# Patient Record
Sex: FEMALE | Race: WHITE | Employment: OTHER | URBAN - METROPOLITAN AREA
[De-identification: names, ages, dates, MRNs, and addresses within clinical notes are randomized per-mention and may not be internally consistent; named-entity substitution may affect disease eponyms.]

---

## 2021-01-01 ENCOUNTER — HOSPITAL ENCOUNTER (INPATIENT)
Facility: CLINIC | Age: 31
LOS: 9 days | End: 2021-11-08
Attending: SURGERY | Admitting: SURGERY
Payer: COMMERCIAL

## 2021-01-01 ENCOUNTER — TRANSFERRED RECORDS (OUTPATIENT)
Dept: HEALTH INFORMATION MANAGEMENT | Facility: CLINIC | Age: 31
End: 2021-01-01
Payer: COMMERCIAL

## 2021-01-01 ENCOUNTER — APPOINTMENT (OUTPATIENT)
Dept: ULTRASOUND IMAGING | Facility: CLINIC | Age: 31
End: 2021-01-01
Attending: NURSE PRACTITIONER
Payer: COMMERCIAL

## 2021-01-01 ENCOUNTER — APPOINTMENT (OUTPATIENT)
Dept: GENERAL RADIOLOGY | Facility: CLINIC | Age: 31
End: 2021-01-01
Attending: NURSE PRACTITIONER
Payer: COMMERCIAL

## 2021-01-01 ENCOUNTER — APPOINTMENT (OUTPATIENT)
Dept: GENERAL RADIOLOGY | Facility: CLINIC | Age: 31
End: 2021-01-01
Attending: STUDENT IN AN ORGANIZED HEALTH CARE EDUCATION/TRAINING PROGRAM
Payer: COMMERCIAL

## 2021-01-01 ENCOUNTER — APPOINTMENT (OUTPATIENT)
Dept: CARDIOLOGY | Facility: CLINIC | Age: 31
End: 2021-01-01
Attending: NURSE PRACTITIONER
Payer: COMMERCIAL

## 2021-01-01 VITALS
OXYGEN SATURATION: 88 % | DIASTOLIC BLOOD PRESSURE: 66 MMHG | BODY MASS INDEX: 39.12 KG/M2 | WEIGHT: 243.39 LBS | TEMPERATURE: 97.4 F | SYSTOLIC BLOOD PRESSURE: 118 MMHG | HEIGHT: 66 IN

## 2021-01-01 LAB
ABO/RH(D): NORMAL
ALBUMIN SERPL-MCNC: 1.7 G/DL (ref 3.4–5)
ALBUMIN SERPL-MCNC: 1.8 G/DL (ref 3.4–5)
ALBUMIN SERPL-MCNC: 2.1 G/DL (ref 3.4–5)
ALBUMIN SERPL-MCNC: 2.3 G/DL (ref 3.4–5)
ALBUMIN SERPL-MCNC: 2.4 G/DL (ref 3.4–5)
ALBUMIN SERPL-MCNC: 2.5 G/DL (ref 3.4–5)
ALBUMIN UR-MCNC: 100 MG/DL
ALBUMIN UR-MCNC: 200 MG/DL
ALBUMIN UR-MCNC: 30 MG/DL
ALP SERPL-CCNC: 107 U/L (ref 40–150)
ALP SERPL-CCNC: 82 U/L (ref 40–150)
ALP SERPL-CCNC: 85 U/L (ref 40–150)
ALP SERPL-CCNC: 93 U/L (ref 40–150)
ALP SERPL-CCNC: 95 U/L (ref 40–150)
ALT SERPL W P-5'-P-CCNC: 20 U/L (ref 0–50)
ALT SERPL W P-5'-P-CCNC: 32 U/L (ref 0–50)
ALT SERPL W P-5'-P-CCNC: 40 U/L (ref 0–50)
ALT SERPL W P-5'-P-CCNC: 44 U/L (ref 0–50)
ALT SERPL W P-5'-P-CCNC: 51 U/L (ref 0–50)
ANION GAP SERPL CALCULATED.3IONS-SCNC: 10 MMOL/L (ref 3–14)
ANION GAP SERPL CALCULATED.3IONS-SCNC: 5 MMOL/L (ref 3–14)
ANION GAP SERPL CALCULATED.3IONS-SCNC: 5 MMOL/L (ref 3–14)
ANION GAP SERPL CALCULATED.3IONS-SCNC: 6 MMOL/L (ref 3–14)
ANION GAP SERPL CALCULATED.3IONS-SCNC: 7 MMOL/L (ref 3–14)
ANION GAP SERPL CALCULATED.3IONS-SCNC: 8 MMOL/L (ref 3–14)
APPEARANCE UR: ABNORMAL
APPEARANCE UR: ABNORMAL
APPEARANCE UR: CLEAR
APTT PPP: 25 SECONDS (ref 22–38)
AST SERPL W P-5'-P-CCNC: 17 U/L (ref 0–45)
AST SERPL W P-5'-P-CCNC: 31 U/L (ref 0–45)
AST SERPL W P-5'-P-CCNC: 42 U/L (ref 0–45)
AST SERPL W P-5'-P-CCNC: 54 U/L (ref 0–45)
AST SERPL W P-5'-P-CCNC: ABNORMAL U/L
ATRIAL RATE - MUSE: 94 BPM
BACTERIA #/AREA URNS HPF: ABNORMAL /HPF
BACTERIA BLD CULT: ABNORMAL
BACTERIA BLD CULT: ABNORMAL
BACTERIA BLD CULT: NO GROWTH
BACTERIA SPT CULT: ABNORMAL
BACTERIA UR CULT: NORMAL
BASE EXCESS BLDA CALC-SCNC: -0.7 MMOL/L (ref -9–1.8)
BASE EXCESS BLDA CALC-SCNC: -3.6 MMOL/L (ref -9–1.8)
BASE EXCESS BLDA CALC-SCNC: -3.7 MMOL/L (ref -9–1.8)
BASE EXCESS BLDA CALC-SCNC: -4.5 MMOL/L (ref -9–1.8)
BASE EXCESS BLDA CALC-SCNC: -4.8 MMOL/L (ref -9–1.8)
BASE EXCESS BLDA CALC-SCNC: -5.5 MMOL/L (ref -9–1.8)
BASE EXCESS BLDA CALC-SCNC: -6.5 MMOL/L (ref -9–1.8)
BASE EXCESS BLDA CALC-SCNC: -6.8 MMOL/L (ref -9–1.8)
BASE EXCESS BLDA CALC-SCNC: -6.9 MMOL/L (ref -9–1.8)
BASE EXCESS BLDA CALC-SCNC: -6.9 MMOL/L (ref -9–1.8)
BASE EXCESS BLDA CALC-SCNC: -7 MMOL/L (ref -9–1.8)
BASE EXCESS BLDA CALC-SCNC: -7 MMOL/L (ref -9–1.8)
BASE EXCESS BLDA CALC-SCNC: -7.2 MMOL/L (ref -9–1.8)
BASE EXCESS BLDA CALC-SCNC: -7.3 MMOL/L (ref -9–1.8)
BASE EXCESS BLDA CALC-SCNC: -7.6 MMOL/L (ref -9–1.8)
BASE EXCESS BLDA CALC-SCNC: -7.8 MMOL/L (ref -9–1.8)
BASE EXCESS BLDA CALC-SCNC: -8.2 MMOL/L (ref -9–1.8)
BASE EXCESS BLDA CALC-SCNC: -8.2 MMOL/L (ref -9–1.8)
BASE EXCESS BLDA CALC-SCNC: 2.6 MMOL/L (ref -9–1.8)
BASE EXCESS BLDA CALC-SCNC: 2.8 MMOL/L (ref -9–1.8)
BASE EXCESS BLDA CALC-SCNC: 3.1 MMOL/L (ref -9–1.8)
BASE EXCESS BLDA CALC-SCNC: 4 MMOL/L (ref -9–1.8)
BASE EXCESS BLDA CALC-SCNC: 4.2 MMOL/L (ref -9–1.8)
BASE EXCESS BLDA CALC-SCNC: 4.2 MMOL/L (ref -9–1.8)
BASE EXCESS BLDA CALC-SCNC: 4.8 MMOL/L (ref -9–1.8)
BASE EXCESS BLDA CALC-SCNC: 6.2 MMOL/L (ref -9–1.8)
BASE EXCESS BLDA CALC-SCNC: 7.8 MMOL/L (ref -9–1.8)
BASOPHILS # BLD MANUAL: 0.1 10E3/UL (ref 0–0.2)
BASOPHILS NFR BLD MANUAL: 1 %
BILIRUB SERPL-MCNC: 0.2 MG/DL (ref 0.2–1.3)
BILIRUB SERPL-MCNC: 0.3 MG/DL (ref 0.2–1.3)
BILIRUB SERPL-MCNC: 0.4 MG/DL (ref 0.2–1.3)
BILIRUB UR QL STRIP: NEGATIVE
BUN SERPL-MCNC: 108 MG/DL (ref 7–30)
BUN SERPL-MCNC: 108 MG/DL (ref 7–30)
BUN SERPL-MCNC: 109 MG/DL (ref 7–30)
BUN SERPL-MCNC: 116 MG/DL (ref 7–30)
BUN SERPL-MCNC: 120 MG/DL (ref 7–30)
BUN SERPL-MCNC: 123 MG/DL (ref 7–30)
BUN SERPL-MCNC: 26 MG/DL (ref 7–30)
BUN SERPL-MCNC: 36 MG/DL (ref 7–30)
BUN SERPL-MCNC: 47 MG/DL (ref 7–30)
BUN SERPL-MCNC: 49 MG/DL (ref 7–30)
BUN SERPL-MCNC: 56 MG/DL (ref 7–30)
BUN SERPL-MCNC: 59 MG/DL (ref 7–30)
BUN SERPL-MCNC: 60 MG/DL (ref 7–30)
BUN SERPL-MCNC: 62 MG/DL (ref 7–30)
BUN SERPL-MCNC: 66 MG/DL (ref 7–30)
BUN SERPL-MCNC: 72 MG/DL (ref 7–30)
BUN SERPL-MCNC: 76 MG/DL (ref 7–30)
BUN SERPL-MCNC: 79 MG/DL (ref 7–30)
BUN SERPL-MCNC: 88 MG/DL (ref 7–30)
BUN SERPL-MCNC: 90 MG/DL (ref 7–30)
CA-I BLD-MCNC: 4.1 MG/DL (ref 4.4–5.2)
CA-I BLD-MCNC: 4.3 MG/DL (ref 4.4–5.2)
CA-I BLD-MCNC: 4.4 MG/DL (ref 4.4–5.2)
CA-I BLD-MCNC: 4.6 MG/DL (ref 4.4–5.2)
CA-I BLD-MCNC: 4.7 MG/DL (ref 4.4–5.2)
CA-I BLD-MCNC: 4.9 MG/DL (ref 4.4–5.2)
CALCIUM SERPL-MCNC: 7.2 MG/DL (ref 8.5–10.1)
CALCIUM SERPL-MCNC: 7.2 MG/DL (ref 8.5–10.1)
CALCIUM SERPL-MCNC: 7.3 MG/DL (ref 8.5–10.1)
CALCIUM SERPL-MCNC: 7.4 MG/DL (ref 8.5–10.1)
CALCIUM SERPL-MCNC: 7.6 MG/DL (ref 8.5–10.1)
CALCIUM SERPL-MCNC: 7.8 MG/DL (ref 8.5–10.1)
CALCIUM SERPL-MCNC: 8 MG/DL (ref 8.5–10.1)
CALCIUM SERPL-MCNC: 8 MG/DL (ref 8.5–10.1)
CALCIUM SERPL-MCNC: 8.3 MG/DL (ref 8.5–10.1)
CALCIUM SERPL-MCNC: 8.6 MG/DL (ref 8.5–10.1)
CALCIUM SERPL-MCNC: 9 MG/DL (ref 8.5–10.1)
CHLORIDE BLD-SCNC: 104 MMOL/L (ref 94–109)
CHLORIDE BLD-SCNC: 105 MMOL/L (ref 94–109)
CHLORIDE BLD-SCNC: 106 MMOL/L (ref 94–109)
CHLORIDE BLD-SCNC: 108 MMOL/L (ref 94–109)
CHLORIDE BLD-SCNC: 111 MMOL/L (ref 94–109)
CHLORIDE BLD-SCNC: 111 MMOL/L (ref 94–109)
CHLORIDE BLD-SCNC: 112 MMOL/L (ref 94–109)
CHLORIDE BLD-SCNC: 113 MMOL/L (ref 94–109)
CHLORIDE BLD-SCNC: 114 MMOL/L (ref 94–109)
CHLORIDE BLD-SCNC: 115 MMOL/L (ref 94–109)
CHLORIDE BLD-SCNC: 116 MMOL/L (ref 94–109)
CK SERPL-CCNC: 53 U/L (ref 30–225)
CO2 SERPL-SCNC: 19 MMOL/L (ref 20–32)
CO2 SERPL-SCNC: 19 MMOL/L (ref 20–32)
CO2 SERPL-SCNC: 20 MMOL/L (ref 20–32)
CO2 SERPL-SCNC: 21 MMOL/L (ref 20–32)
CO2 SERPL-SCNC: 21 MMOL/L (ref 20–32)
CO2 SERPL-SCNC: 22 MMOL/L (ref 20–32)
CO2 SERPL-SCNC: 24 MMOL/L (ref 20–32)
CO2 SERPL-SCNC: 24 MMOL/L (ref 20–32)
CO2 SERPL-SCNC: 27 MMOL/L (ref 20–32)
CO2 SERPL-SCNC: 28 MMOL/L (ref 20–32)
CO2 SERPL-SCNC: 28 MMOL/L (ref 20–32)
CO2 SERPL-SCNC: 29 MMOL/L (ref 20–32)
CO2 SERPL-SCNC: 29 MMOL/L (ref 20–32)
CO2 SERPL-SCNC: 30 MMOL/L (ref 20–32)
CO2 SERPL-SCNC: 30 MMOL/L (ref 20–32)
CO2 SERPL-SCNC: 32 MMOL/L (ref 20–32)
COLOR UR AUTO: ABNORMAL
COLOR UR AUTO: YELLOW
COLOR UR AUTO: YELLOW
CREAT SERPL-MCNC: 0.94 MG/DL (ref 0.52–1.04)
CREAT SERPL-MCNC: 1.23 MG/DL (ref 0.52–1.04)
CREAT SERPL-MCNC: 1.34 MG/DL (ref 0.52–1.04)
CREAT SERPL-MCNC: 1.76 MG/DL (ref 0.52–1.04)
CREAT SERPL-MCNC: 1.79 MG/DL (ref 0.52–1.04)
CREAT SERPL-MCNC: 1.97 MG/DL (ref 0.52–1.04)
CREAT SERPL-MCNC: 1.97 MG/DL (ref 0.52–1.04)
CREAT SERPL-MCNC: 2.15 MG/DL (ref 0.52–1.04)
CREAT SERPL-MCNC: 2.15 MG/DL (ref 0.52–1.04)
CREAT SERPL-MCNC: 2.48 MG/DL (ref 0.52–1.04)
CREAT SERPL-MCNC: 2.49 MG/DL (ref 0.52–1.04)
CREAT SERPL-MCNC: 3.08 MG/DL (ref 0.52–1.04)
CREAT SERPL-MCNC: 3.11 MG/DL (ref 0.52–1.04)
CREAT SERPL-MCNC: 3.3 MG/DL (ref 0.52–1.04)
CREAT SERPL-MCNC: 3.4 MG/DL (ref 0.52–1.04)
CREAT SERPL-MCNC: 3.41 MG/DL (ref 0.52–1.04)
CREAT SERPL-MCNC: 3.42 MG/DL (ref 0.52–1.04)
CREAT SERPL-MCNC: 3.43 MG/DL (ref 0.52–1.04)
CREAT SERPL-MCNC: 3.45 MG/DL (ref 0.52–1.04)
CREAT SERPL-MCNC: 3.58 MG/DL (ref 0.52–1.04)
CREAT SERPL-MCNC: 3.6 MG/DL (ref 0.52–1.04)
CREAT UR-MCNC: 88 MG/DL
CREATININE (EXTERNAL): 0.67 MG/DL (ref 0.5–0.9)
CREATININE (EXTERNAL): 1.18 MG/DL (ref 0.5–0.9)
CREATININE (EXTERNAL): 1.4 MG-DL (ref 0.5–0.9)
CRP SERPL-MCNC: 5.7 MG/L (ref 0–8)
CRP SERPL-MCNC: 7.7 MG/L (ref 0–8)
CRP SERPL-MCNC: 83 MG/L (ref 0–8)
CRP SERPL-MCNC: 98 MG/L (ref 0–8)
D DIMER PPP FEU-MCNC: 1.96 UG/ML FEU (ref 0–0.5)
D DIMER PPP FEU-MCNC: 17.83 UG/ML FEU (ref 0–0.5)
D DIMER PPP FEU-MCNC: 3.47 UG/ML FEU (ref 0–0.5)
D DIMER PPP FEU-MCNC: 9.9 UG/ML FEU (ref 0–0.5)
D DIMER PPP FEU-MCNC: >20 UG/ML FEU (ref 0–0.5)
DIASTOLIC BLOOD PRESSURE - MUSE: NORMAL MMHG
ENTEROCOCCUS FAECALIS: NOT DETECTED
ENTEROCOCCUS FAECIUM: NOT DETECTED
EOSINOPHIL # BLD MANUAL: 0 10E3/UL (ref 0–0.7)
EOSINOPHIL NFR BLD MANUAL: 0 %
ERYTHROCYTE [DISTWIDTH] IN BLOOD BY AUTOMATED COUNT: 13.3 % (ref 10–15)
ERYTHROCYTE [DISTWIDTH] IN BLOOD BY AUTOMATED COUNT: 13.5 % (ref 10–15)
ERYTHROCYTE [DISTWIDTH] IN BLOOD BY AUTOMATED COUNT: 13.5 % (ref 10–15)
ERYTHROCYTE [DISTWIDTH] IN BLOOD BY AUTOMATED COUNT: 13.7 % (ref 10–15)
ERYTHROCYTE [DISTWIDTH] IN BLOOD BY AUTOMATED COUNT: 13.7 % (ref 10–15)
ERYTHROCYTE [DISTWIDTH] IN BLOOD BY AUTOMATED COUNT: 13.8 % (ref 10–15)
ERYTHROCYTE [DISTWIDTH] IN BLOOD BY AUTOMATED COUNT: 13.9 % (ref 10–15)
ERYTHROCYTE [DISTWIDTH] IN BLOOD BY AUTOMATED COUNT: 14 % (ref 10–15)
ERYTHROCYTE [DISTWIDTH] IN BLOOD BY AUTOMATED COUNT: 14.1 % (ref 10–15)
FIBRINOGEN PPP-MCNC: 490 MG/DL (ref 170–490)
FIBRINOGEN PPP-MCNC: 544 MG/DL (ref 170–490)
FIBRINOGEN PPP-MCNC: 572 MG/DL (ref 170–490)
FIBRINOGEN PPP-MCNC: 587 MG/DL (ref 170–490)
FIBRINOGEN PPP-MCNC: 603 MG/DL (ref 170–490)
FRACT EXCRET NA UR+SERPL-RTO: 0.5 %
GFR ESTIMATED (EXTERNAL): 102 ML-MIN (ref 60–120)
GFR ESTIMATED (EXTERNAL): 44 ML-MIN (ref 60–120)
GFR ESTIMATED (EXTERNAL): 53 ML-MIN (ref 60–120)
GFR SERPL CREATININE-BSD FRML MDRD: 16 ML/MIN/1.73M2
GFR SERPL CREATININE-BSD FRML MDRD: 16 ML/MIN/1.73M2
GFR SERPL CREATININE-BSD FRML MDRD: 17 ML/MIN/1.73M2
GFR SERPL CREATININE-BSD FRML MDRD: 18 ML/MIN/1.73M2
GFR SERPL CREATININE-BSD FRML MDRD: 19 ML/MIN/1.73M2
GFR SERPL CREATININE-BSD FRML MDRD: 19 ML/MIN/1.73M2
GFR SERPL CREATININE-BSD FRML MDRD: 25 ML/MIN/1.73M2
GFR SERPL CREATININE-BSD FRML MDRD: 30 ML/MIN/1.73M2
GFR SERPL CREATININE-BSD FRML MDRD: 30 ML/MIN/1.73M2
GFR SERPL CREATININE-BSD FRML MDRD: 33 ML/MIN/1.73M2
GFR SERPL CREATININE-BSD FRML MDRD: 33 ML/MIN/1.73M2
GFR SERPL CREATININE-BSD FRML MDRD: 37 ML/MIN/1.73M2
GFR SERPL CREATININE-BSD FRML MDRD: 38 ML/MIN/1.73M2
GFR SERPL CREATININE-BSD FRML MDRD: 52 ML/MIN/1.73M2
GFR SERPL CREATININE-BSD FRML MDRD: 58 ML/MIN/1.73M2
GFR SERPL CREATININE-BSD FRML MDRD: 81 ML/MIN/1.73M2
GLUCOSE (EXTERNAL): 202 MG-DL (ref 70–100)
GLUCOSE (EXTERNAL): 232 MG-DL (ref 70–100)
GLUCOSE (EXTERNAL): 273 MG-DL (ref 70–100)
GLUCOSE BLD-MCNC: 103 MG/DL (ref 70–99)
GLUCOSE BLD-MCNC: 115 MG/DL (ref 70–99)
GLUCOSE BLD-MCNC: 139 MG/DL (ref 70–99)
GLUCOSE BLD-MCNC: 141 MG/DL (ref 70–99)
GLUCOSE BLD-MCNC: 143 MG/DL (ref 70–99)
GLUCOSE BLD-MCNC: 145 MG/DL (ref 70–99)
GLUCOSE BLD-MCNC: 181 MG/DL (ref 70–99)
GLUCOSE BLD-MCNC: 182 MG/DL (ref 70–99)
GLUCOSE BLD-MCNC: 189 MG/DL (ref 70–99)
GLUCOSE BLD-MCNC: 198 MG/DL (ref 70–99)
GLUCOSE BLD-MCNC: 200 MG/DL (ref 70–99)
GLUCOSE BLD-MCNC: 213 MG/DL (ref 70–99)
GLUCOSE BLD-MCNC: 215 MG/DL (ref 70–99)
GLUCOSE BLD-MCNC: 221 MG/DL (ref 70–99)
GLUCOSE BLD-MCNC: 222 MG/DL (ref 70–99)
GLUCOSE BLD-MCNC: 270 MG/DL (ref 70–99)
GLUCOSE BLD-MCNC: 300 MG/DL (ref 70–99)
GLUCOSE BLD-MCNC: 62 MG/DL (ref 70–99)
GLUCOSE BLDC GLUCOMTR-MCNC: 100 MG/DL (ref 70–99)
GLUCOSE BLDC GLUCOMTR-MCNC: 101 MG/DL (ref 70–99)
GLUCOSE BLDC GLUCOMTR-MCNC: 101 MG/DL (ref 70–99)
GLUCOSE BLDC GLUCOMTR-MCNC: 102 MG/DL (ref 70–99)
GLUCOSE BLDC GLUCOMTR-MCNC: 105 MG/DL (ref 70–99)
GLUCOSE BLDC GLUCOMTR-MCNC: 106 MG/DL (ref 70–99)
GLUCOSE BLDC GLUCOMTR-MCNC: 107 MG/DL (ref 70–99)
GLUCOSE BLDC GLUCOMTR-MCNC: 107 MG/DL (ref 70–99)
GLUCOSE BLDC GLUCOMTR-MCNC: 108 MG/DL (ref 70–99)
GLUCOSE BLDC GLUCOMTR-MCNC: 109 MG/DL (ref 70–99)
GLUCOSE BLDC GLUCOMTR-MCNC: 109 MG/DL (ref 70–99)
GLUCOSE BLDC GLUCOMTR-MCNC: 111 MG/DL (ref 70–99)
GLUCOSE BLDC GLUCOMTR-MCNC: 112 MG/DL (ref 70–99)
GLUCOSE BLDC GLUCOMTR-MCNC: 112 MG/DL (ref 70–99)
GLUCOSE BLDC GLUCOMTR-MCNC: 113 MG/DL (ref 70–99)
GLUCOSE BLDC GLUCOMTR-MCNC: 115 MG/DL (ref 70–99)
GLUCOSE BLDC GLUCOMTR-MCNC: 116 MG/DL (ref 70–99)
GLUCOSE BLDC GLUCOMTR-MCNC: 117 MG/DL (ref 70–99)
GLUCOSE BLDC GLUCOMTR-MCNC: 119 MG/DL (ref 70–99)
GLUCOSE BLDC GLUCOMTR-MCNC: 120 MG/DL (ref 70–99)
GLUCOSE BLDC GLUCOMTR-MCNC: 125 MG/DL (ref 70–99)
GLUCOSE BLDC GLUCOMTR-MCNC: 127 MG/DL (ref 70–99)
GLUCOSE BLDC GLUCOMTR-MCNC: 128 MG/DL (ref 70–99)
GLUCOSE BLDC GLUCOMTR-MCNC: 129 MG/DL (ref 70–99)
GLUCOSE BLDC GLUCOMTR-MCNC: 131 MG/DL (ref 70–99)
GLUCOSE BLDC GLUCOMTR-MCNC: 132 MG/DL (ref 70–99)
GLUCOSE BLDC GLUCOMTR-MCNC: 133 MG/DL (ref 70–99)
GLUCOSE BLDC GLUCOMTR-MCNC: 134 MG/DL (ref 70–99)
GLUCOSE BLDC GLUCOMTR-MCNC: 136 MG/DL (ref 70–99)
GLUCOSE BLDC GLUCOMTR-MCNC: 139 MG/DL (ref 70–99)
GLUCOSE BLDC GLUCOMTR-MCNC: 140 MG/DL (ref 70–99)
GLUCOSE BLDC GLUCOMTR-MCNC: 145 MG/DL (ref 70–99)
GLUCOSE BLDC GLUCOMTR-MCNC: 146 MG/DL (ref 70–99)
GLUCOSE BLDC GLUCOMTR-MCNC: 146 MG/DL (ref 70–99)
GLUCOSE BLDC GLUCOMTR-MCNC: 147 MG/DL (ref 70–99)
GLUCOSE BLDC GLUCOMTR-MCNC: 147 MG/DL (ref 70–99)
GLUCOSE BLDC GLUCOMTR-MCNC: 149 MG/DL (ref 70–99)
GLUCOSE BLDC GLUCOMTR-MCNC: 151 MG/DL (ref 70–99)
GLUCOSE BLDC GLUCOMTR-MCNC: 152 MG/DL (ref 70–99)
GLUCOSE BLDC GLUCOMTR-MCNC: 153 MG/DL (ref 70–99)
GLUCOSE BLDC GLUCOMTR-MCNC: 154 MG/DL (ref 70–99)
GLUCOSE BLDC GLUCOMTR-MCNC: 156 MG/DL (ref 70–99)
GLUCOSE BLDC GLUCOMTR-MCNC: 159 MG/DL (ref 70–99)
GLUCOSE BLDC GLUCOMTR-MCNC: 160 MG/DL (ref 70–99)
GLUCOSE BLDC GLUCOMTR-MCNC: 163 MG/DL (ref 70–99)
GLUCOSE BLDC GLUCOMTR-MCNC: 164 MG/DL (ref 70–99)
GLUCOSE BLDC GLUCOMTR-MCNC: 164 MG/DL (ref 70–99)
GLUCOSE BLDC GLUCOMTR-MCNC: 165 MG/DL (ref 70–99)
GLUCOSE BLDC GLUCOMTR-MCNC: 165 MG/DL (ref 70–99)
GLUCOSE BLDC GLUCOMTR-MCNC: 167 MG/DL (ref 70–99)
GLUCOSE BLDC GLUCOMTR-MCNC: 170 MG/DL (ref 70–99)
GLUCOSE BLDC GLUCOMTR-MCNC: 173 MG/DL (ref 70–99)
GLUCOSE BLDC GLUCOMTR-MCNC: 174 MG/DL (ref 70–99)
GLUCOSE BLDC GLUCOMTR-MCNC: 174 MG/DL (ref 70–99)
GLUCOSE BLDC GLUCOMTR-MCNC: 175 MG/DL (ref 70–99)
GLUCOSE BLDC GLUCOMTR-MCNC: 176 MG/DL (ref 70–99)
GLUCOSE BLDC GLUCOMTR-MCNC: 176 MG/DL (ref 70–99)
GLUCOSE BLDC GLUCOMTR-MCNC: 177 MG/DL (ref 70–99)
GLUCOSE BLDC GLUCOMTR-MCNC: 179 MG/DL (ref 70–99)
GLUCOSE BLDC GLUCOMTR-MCNC: 180 MG/DL (ref 70–99)
GLUCOSE BLDC GLUCOMTR-MCNC: 181 MG/DL (ref 70–99)
GLUCOSE BLDC GLUCOMTR-MCNC: 182 MG/DL (ref 70–99)
GLUCOSE BLDC GLUCOMTR-MCNC: 183 MG/DL (ref 70–99)
GLUCOSE BLDC GLUCOMTR-MCNC: 185 MG/DL (ref 70–99)
GLUCOSE BLDC GLUCOMTR-MCNC: 186 MG/DL (ref 70–99)
GLUCOSE BLDC GLUCOMTR-MCNC: 187 MG/DL (ref 70–99)
GLUCOSE BLDC GLUCOMTR-MCNC: 188 MG/DL (ref 70–99)
GLUCOSE BLDC GLUCOMTR-MCNC: 188 MG/DL (ref 70–99)
GLUCOSE BLDC GLUCOMTR-MCNC: 189 MG/DL (ref 70–99)
GLUCOSE BLDC GLUCOMTR-MCNC: 191 MG/DL (ref 70–99)
GLUCOSE BLDC GLUCOMTR-MCNC: 191 MG/DL (ref 70–99)
GLUCOSE BLDC GLUCOMTR-MCNC: 192 MG/DL (ref 70–99)
GLUCOSE BLDC GLUCOMTR-MCNC: 192 MG/DL (ref 70–99)
GLUCOSE BLDC GLUCOMTR-MCNC: 194 MG/DL (ref 70–99)
GLUCOSE BLDC GLUCOMTR-MCNC: 197 MG/DL (ref 70–99)
GLUCOSE BLDC GLUCOMTR-MCNC: 197 MG/DL (ref 70–99)
GLUCOSE BLDC GLUCOMTR-MCNC: 198 MG/DL (ref 70–99)
GLUCOSE BLDC GLUCOMTR-MCNC: 199 MG/DL (ref 70–99)
GLUCOSE BLDC GLUCOMTR-MCNC: 201 MG/DL (ref 70–99)
GLUCOSE BLDC GLUCOMTR-MCNC: 201 MG/DL (ref 70–99)
GLUCOSE BLDC GLUCOMTR-MCNC: 202 MG/DL (ref 70–99)
GLUCOSE BLDC GLUCOMTR-MCNC: 202 MG/DL (ref 70–99)
GLUCOSE BLDC GLUCOMTR-MCNC: 203 MG/DL (ref 70–99)
GLUCOSE BLDC GLUCOMTR-MCNC: 204 MG/DL (ref 70–99)
GLUCOSE BLDC GLUCOMTR-MCNC: 205 MG/DL (ref 70–99)
GLUCOSE BLDC GLUCOMTR-MCNC: 205 MG/DL (ref 70–99)
GLUCOSE BLDC GLUCOMTR-MCNC: 208 MG/DL (ref 70–99)
GLUCOSE BLDC GLUCOMTR-MCNC: 213 MG/DL (ref 70–99)
GLUCOSE BLDC GLUCOMTR-MCNC: 213 MG/DL (ref 70–99)
GLUCOSE BLDC GLUCOMTR-MCNC: 214 MG/DL (ref 70–99)
GLUCOSE BLDC GLUCOMTR-MCNC: 217 MG/DL (ref 70–99)
GLUCOSE BLDC GLUCOMTR-MCNC: 218 MG/DL (ref 70–99)
GLUCOSE BLDC GLUCOMTR-MCNC: 223 MG/DL (ref 70–99)
GLUCOSE BLDC GLUCOMTR-MCNC: 226 MG/DL (ref 70–99)
GLUCOSE BLDC GLUCOMTR-MCNC: 231 MG/DL (ref 70–99)
GLUCOSE BLDC GLUCOMTR-MCNC: 231 MG/DL (ref 70–99)
GLUCOSE BLDC GLUCOMTR-MCNC: 232 MG/DL (ref 70–99)
GLUCOSE BLDC GLUCOMTR-MCNC: 232 MG/DL (ref 70–99)
GLUCOSE BLDC GLUCOMTR-MCNC: 239 MG/DL (ref 70–99)
GLUCOSE BLDC GLUCOMTR-MCNC: 240 MG/DL (ref 70–99)
GLUCOSE BLDC GLUCOMTR-MCNC: 241 MG/DL (ref 70–99)
GLUCOSE BLDC GLUCOMTR-MCNC: 241 MG/DL (ref 70–99)
GLUCOSE BLDC GLUCOMTR-MCNC: 246 MG/DL (ref 70–99)
GLUCOSE BLDC GLUCOMTR-MCNC: 247 MG/DL (ref 70–99)
GLUCOSE BLDC GLUCOMTR-MCNC: 251 MG/DL (ref 70–99)
GLUCOSE BLDC GLUCOMTR-MCNC: 253 MG/DL (ref 70–99)
GLUCOSE BLDC GLUCOMTR-MCNC: 257 MG/DL (ref 70–99)
GLUCOSE BLDC GLUCOMTR-MCNC: 260 MG/DL (ref 70–99)
GLUCOSE BLDC GLUCOMTR-MCNC: 261 MG/DL (ref 70–99)
GLUCOSE BLDC GLUCOMTR-MCNC: 268 MG/DL (ref 70–99)
GLUCOSE BLDC GLUCOMTR-MCNC: 298 MG/DL (ref 70–99)
GLUCOSE BLDC GLUCOMTR-MCNC: 307 MG/DL (ref 70–99)
GLUCOSE BLDC GLUCOMTR-MCNC: 309 MG/DL (ref 70–99)
GLUCOSE BLDC GLUCOMTR-MCNC: 311 MG/DL (ref 70–99)
GLUCOSE BLDC GLUCOMTR-MCNC: 312 MG/DL (ref 70–99)
GLUCOSE BLDC GLUCOMTR-MCNC: 321 MG/DL (ref 70–99)
GLUCOSE BLDC GLUCOMTR-MCNC: 358 MG/DL (ref 70–99)
GLUCOSE BLDC GLUCOMTR-MCNC: 392 MG/DL (ref 70–99)
GLUCOSE BLDC GLUCOMTR-MCNC: 49 MG/DL (ref 70–99)
GLUCOSE BLDC GLUCOMTR-MCNC: 83 MG/DL (ref 70–99)
GLUCOSE BLDC GLUCOMTR-MCNC: 84 MG/DL (ref 70–99)
GLUCOSE BLDC GLUCOMTR-MCNC: 90 MG/DL (ref 70–99)
GLUCOSE BLDC GLUCOMTR-MCNC: 91 MG/DL (ref 70–99)
GLUCOSE BLDC GLUCOMTR-MCNC: 92 MG/DL (ref 70–99)
GLUCOSE BLDC GLUCOMTR-MCNC: 95 MG/DL (ref 70–99)
GLUCOSE BLDC GLUCOMTR-MCNC: 96 MG/DL (ref 70–99)
GLUCOSE BLDC GLUCOMTR-MCNC: 98 MG/DL (ref 70–99)
GLUCOSE BLDC GLUCOMTR-MCNC: 98 MG/DL (ref 70–99)
GLUCOSE UR STRIP-MCNC: NEGATIVE MG/DL
GRAM STAIN RESULT: ABNORMAL
GRANULAR CAST: 76 /LPF
HBA1C MFR BLD: 12.1 % (ref 0–5.6)
HCO3 BLD-SCNC: 18 MMOL/L (ref 21–28)
HCO3 BLD-SCNC: 19 MMOL/L (ref 21–28)
HCO3 BLD-SCNC: 20 MMOL/L (ref 21–28)
HCO3 BLD-SCNC: 21 MMOL/L (ref 21–28)
HCO3 BLD-SCNC: 22 MMOL/L (ref 21–28)
HCO3 BLD-SCNC: 22 MMOL/L (ref 21–28)
HCO3 BLD-SCNC: 23 MMOL/L (ref 21–28)
HCO3 BLD-SCNC: 24 MMOL/L (ref 21–28)
HCO3 BLD-SCNC: 25 MMOL/L (ref 21–28)
HCO3 BLD-SCNC: 29 MMOL/L (ref 21–28)
HCO3 BLD-SCNC: 29 MMOL/L (ref 21–28)
HCO3 BLD-SCNC: 30 MMOL/L (ref 21–28)
HCO3 BLD-SCNC: 31 MMOL/L (ref 21–28)
HCO3 BLD-SCNC: 32 MMOL/L (ref 21–28)
HCO3 BLD-SCNC: 33 MMOL/L (ref 21–28)
HCT VFR BLD AUTO: 24 % (ref 35–47)
HCT VFR BLD AUTO: 24.1 % (ref 35–47)
HCT VFR BLD AUTO: 24.3 % (ref 35–47)
HCT VFR BLD AUTO: 24.4 % (ref 35–47)
HCT VFR BLD AUTO: 24.7 % (ref 35–47)
HCT VFR BLD AUTO: 24.7 % (ref 35–47)
HCT VFR BLD AUTO: 24.9 % (ref 35–47)
HCT VFR BLD AUTO: 25.7 % (ref 35–47)
HCT VFR BLD AUTO: 25.9 % (ref 35–47)
HCT VFR BLD AUTO: 31.5 % (ref 35–47)
HCT VFR BLD AUTO: 36.2 % (ref 35–47)
HGB BLD-MCNC: 10.1 G/DL (ref 11.7–15.7)
HGB BLD-MCNC: 11.5 G/DL (ref 11.7–15.7)
HGB BLD-MCNC: 7.6 G/DL (ref 11.7–15.7)
HGB BLD-MCNC: 7.7 G/DL (ref 11.7–15.7)
HGB BLD-MCNC: 7.8 G/DL (ref 11.7–15.7)
HGB BLD-MCNC: 7.9 G/DL (ref 11.7–15.7)
HGB BLD-MCNC: 8 G/DL (ref 11.7–15.7)
HGB UR QL STRIP: ABNORMAL
HGB UR QL STRIP: ABNORMAL
HGB UR QL STRIP: NEGATIVE
IL6 SERPL-MCNC: >3000 PG/ML
INR PPP: 0.99 (ref 0.85–1.15)
INTERPRETATION ECG - MUSE: NORMAL
KETONES UR STRIP-MCNC: NEGATIVE MG/DL
L PNEUMO1 AG UR QL IA: NEGATIVE
L PNEUMO1 AG UR QL IA: NEGATIVE
LACTATE SERPL-SCNC: 1.4 MMOL/L (ref 0.7–2)
LACTATE SERPL-SCNC: 1.8 MMOL/L (ref 0.7–2)
LACTATE SERPL-SCNC: 2.2 MMOL/L (ref 0.7–2)
LACTATE SERPL-SCNC: 3.2 MMOL/L (ref 0.7–2)
LDH SERPL L TO P-CCNC: NORMAL U/L
LEUKOCYTE ESTERASE UR QL STRIP: ABNORMAL
LEUKOCYTE ESTERASE UR QL STRIP: ABNORMAL
LEUKOCYTE ESTERASE UR QL STRIP: NEGATIVE
LIPASE SERPL-CCNC: 212 U/L (ref 73–393)
LISTERIA SPECIES (DETECTED/NOT DETECTED): NOT DETECTED
LVEF ECHO: NORMAL
LYMPHOCYTES # BLD MANUAL: 1.3 10E3/UL (ref 0.8–5.3)
LYMPHOCYTES NFR BLD MANUAL: 16 %
MAGNESIUM SERPL-MCNC: 2.3 MG/DL (ref 1.6–2.3)
MAGNESIUM SERPL-MCNC: 2.4 MG/DL (ref 1.6–2.3)
MAGNESIUM SERPL-MCNC: 2.4 MG/DL (ref 1.6–2.3)
MAGNESIUM SERPL-MCNC: 2.6 MG/DL (ref 1.6–2.3)
MAGNESIUM SERPL-MCNC: 2.6 MG/DL (ref 1.6–2.3)
MCH RBC QN AUTO: 28.6 PG (ref 26.5–33)
MCH RBC QN AUTO: 28.6 PG (ref 26.5–33)
MCH RBC QN AUTO: 28.9 PG (ref 26.5–33)
MCH RBC QN AUTO: 28.9 PG (ref 26.5–33)
MCH RBC QN AUTO: 29 PG (ref 26.5–33)
MCH RBC QN AUTO: 29.1 PG (ref 26.5–33)
MCH RBC QN AUTO: 29.2 PG (ref 26.5–33)
MCH RBC QN AUTO: 29.3 PG (ref 26.5–33)
MCH RBC QN AUTO: 29.3 PG (ref 26.5–33)
MCH RBC QN AUTO: 29.4 PG (ref 26.5–33)
MCH RBC QN AUTO: 29.6 PG (ref 26.5–33)
MCHC RBC AUTO-ENTMCNC: 30.9 G/DL (ref 31.5–36.5)
MCHC RBC AUTO-ENTMCNC: 31.1 G/DL (ref 31.5–36.5)
MCHC RBC AUTO-ENTMCNC: 31.6 G/DL (ref 31.5–36.5)
MCHC RBC AUTO-ENTMCNC: 31.6 G/DL (ref 31.5–36.5)
MCHC RBC AUTO-ENTMCNC: 31.7 G/DL (ref 31.5–36.5)
MCHC RBC AUTO-ENTMCNC: 31.7 G/DL (ref 31.5–36.5)
MCHC RBC AUTO-ENTMCNC: 31.8 G/DL (ref 31.5–36.5)
MCHC RBC AUTO-ENTMCNC: 32 G/DL (ref 31.5–36.5)
MCHC RBC AUTO-ENTMCNC: 32 G/DL (ref 31.5–36.5)
MCHC RBC AUTO-ENTMCNC: 32.1 G/DL (ref 31.5–36.5)
MCHC RBC AUTO-ENTMCNC: 32.1 G/DL (ref 31.5–36.5)
MCV RBC AUTO: 91 FL (ref 78–100)
MCV RBC AUTO: 92 FL (ref 78–100)
MCV RBC AUTO: 93 FL (ref 78–100)
MCV RBC AUTO: 94 FL (ref 78–100)
MONOCYTES # BLD MANUAL: 0.1 10E3/UL (ref 0–1.3)
MONOCYTES NFR BLD MANUAL: 1 %
MRSA DNA SPEC QL NAA+PROBE: NEGATIVE
MUCOUS THREADS #/AREA URNS LPF: PRESENT /LPF
MYELOCYTES # BLD MANUAL: 0.1 10E3/UL
MYELOCYTES NFR BLD MANUAL: 1 %
NEUTROPHILS # BLD MANUAL: 6.7 10E3/UL (ref 1.6–8.3)
NEUTROPHILS NFR BLD MANUAL: 81 %
NITRATE UR QL: NEGATIVE
O2/TOTAL GAS SETTING VFR VENT: 100 %
O2/TOTAL GAS SETTING VFR VENT: 40 %
O2/TOTAL GAS SETTING VFR VENT: 50 %
O2/TOTAL GAS SETTING VFR VENT: 50 %
O2/TOTAL GAS SETTING VFR VENT: 60 %
O2/TOTAL GAS SETTING VFR VENT: 70 %
O2/TOTAL GAS SETTING VFR VENT: 75 %
O2/TOTAL GAS SETTING VFR VENT: 80 %
O2/TOTAL GAS SETTING VFR VENT: 80 %
O2/TOTAL GAS SETTING VFR VENT: 85 %
OXYHGB MFR BLD: 100 % (ref 92–100)
OXYHGB MFR BLD: 86 % (ref 92–100)
OXYHGB MFR BLD: 88 % (ref 92–100)
OXYHGB MFR BLD: 91 % (ref 92–100)
OXYHGB MFR BLD: 91 % (ref 92–100)
OXYHGB MFR BLD: 92 % (ref 92–100)
OXYHGB MFR BLD: 92 % (ref 92–100)
OXYHGB MFR BLD: 93 % (ref 92–100)
OXYHGB MFR BLD: 93 % (ref 92–100)
OXYHGB MFR BLD: 94 % (ref 92–100)
OXYHGB MFR BLD: 94 % (ref 92–100)
OXYHGB MFR BLD: 95 % (ref 92–100)
OXYHGB MFR BLD: 96 % (ref 92–100)
OXYHGB MFR BLD: 97 % (ref 92–100)
OXYHGB MFR BLD: 98 % (ref 92–100)
OXYHGB MFR BLD: 98 % (ref 92–100)
P AXIS - MUSE: 42 DEGREES
PCO2 BLD: 41 MM HG (ref 35–45)
PCO2 BLD: 42 MM HG (ref 35–45)
PCO2 BLD: 43 MM HG (ref 35–45)
PCO2 BLD: 44 MM HG (ref 35–45)
PCO2 BLD: 45 MM HG (ref 35–45)
PCO2 BLD: 45 MM HG (ref 35–45)
PCO2 BLD: 47 MM HG (ref 35–45)
PCO2 BLD: 48 MM HG (ref 35–45)
PCO2 BLD: 49 MM HG (ref 35–45)
PCO2 BLD: 49 MM HG (ref 35–45)
PCO2 BLD: 51 MM HG (ref 35–45)
PCO2 BLD: 52 MM HG (ref 35–45)
PCO2 BLD: 53 MM HG (ref 35–45)
PCO2 BLD: 53 MM HG (ref 35–45)
PCO2 BLD: 55 MM HG (ref 35–45)
PCO2 BLD: 57 MM HG (ref 35–45)
PCO2 BLD: 58 MM HG (ref 35–45)
PH BLD: 7.21 [PH] (ref 7.35–7.45)
PH BLD: 7.22 [PH] (ref 7.35–7.45)
PH BLD: 7.22 [PH] (ref 7.35–7.45)
PH BLD: 7.23 [PH] (ref 7.35–7.45)
PH BLD: 7.24 [PH] (ref 7.35–7.45)
PH BLD: 7.24 [PH] (ref 7.35–7.45)
PH BLD: 7.25 [PH] (ref 7.35–7.45)
PH BLD: 7.26 [PH] (ref 7.35–7.45)
PH BLD: 7.28 [PH] (ref 7.35–7.45)
PH BLD: 7.29 [PH] (ref 7.35–7.45)
PH BLD: 7.3 [PH] (ref 7.35–7.45)
PH BLD: 7.33 [PH] (ref 7.35–7.45)
PH BLD: 7.33 [PH] (ref 7.35–7.45)
PH BLD: 7.34 [PH] (ref 7.35–7.45)
PH BLD: 7.34 [PH] (ref 7.35–7.45)
PH BLD: 7.35 [PH] (ref 7.35–7.45)
PH BLD: 7.36 [PH] (ref 7.35–7.45)
PH BLD: 7.37 [PH] (ref 7.35–7.45)
PH BLD: 7.39 [PH] (ref 7.35–7.45)
PH BLD: 7.44 [PH] (ref 7.35–7.45)
PH UR STRIP: 5.5 [PH] (ref 5–7)
PHOSPHATE SERPL-MCNC: 3.4 MG/DL (ref 2.5–4.5)
PHOSPHATE SERPL-MCNC: 4.6 MG/DL (ref 2.5–4.5)
PHOSPHATE SERPL-MCNC: 5.2 MG/DL (ref 2.5–4.5)
PHOSPHATE SERPL-MCNC: 5.4 MG/DL (ref 2.5–4.5)
PHOSPHATE SERPL-MCNC: 6.2 MG/DL (ref 2.5–4.5)
PHOSPHATE SERPL-MCNC: 6.4 MG/DL (ref 2.5–4.5)
PHOSPHATE SERPL-MCNC: 6.9 MG/DL (ref 2.5–4.5)
PHOSPHATE SERPL-MCNC: 7.6 MG/DL (ref 2.5–4.5)
PLAT MORPH BLD: ABNORMAL
PLATELET # BLD AUTO: 283 10E3/UL (ref 150–450)
PLATELET # BLD AUTO: 290 10E3/UL (ref 150–450)
PLATELET # BLD AUTO: 320 10E3/UL (ref 150–450)
PLATELET # BLD AUTO: 324 10E3/UL (ref 150–450)
PLATELET # BLD AUTO: 347 10E3/UL (ref 150–450)
PLATELET # BLD AUTO: 348 10E3/UL (ref 150–450)
PLATELET # BLD AUTO: 367 10E3/UL (ref 150–450)
PLATELET # BLD AUTO: 371 10E3/UL (ref 150–450)
PLATELET # BLD AUTO: 380 10E3/UL (ref 150–450)
PLATELET # BLD AUTO: 383 10E3/UL (ref 150–450)
PLATELET # BLD AUTO: 419 10E3/UL (ref 150–450)
PO2 BLD: 108 MM HG (ref 80–105)
PO2 BLD: 132 MM HG (ref 80–105)
PO2 BLD: 141 MM HG (ref 80–105)
PO2 BLD: 146 MM HG (ref 80–105)
PO2 BLD: 172 MM HG (ref 80–105)
PO2 BLD: 188 MM HG (ref 80–105)
PO2 BLD: 61 MM HG (ref 80–105)
PO2 BLD: 63 MM HG (ref 80–105)
PO2 BLD: 64 MM HG (ref 80–105)
PO2 BLD: 66 MM HG (ref 80–105)
PO2 BLD: 69 MM HG (ref 80–105)
PO2 BLD: 70 MM HG (ref 80–105)
PO2 BLD: 71 MM HG (ref 80–105)
PO2 BLD: 73 MM HG (ref 80–105)
PO2 BLD: 76 MM HG (ref 80–105)
PO2 BLD: 76 MM HG (ref 80–105)
PO2 BLD: 80 MM HG (ref 80–105)
PO2 BLD: 83 MM HG (ref 80–105)
PO2 BLD: 88 MM HG (ref 80–105)
PO2 BLD: 91 MM HG (ref 80–105)
PO2 BLD: 92 MM HG (ref 80–105)
PO2 BLD: 92 MM HG (ref 80–105)
POTASSIUM (EXTERNAL): 4.7 MMOL-L (ref 3.4–5.1)
POTASSIUM (EXTERNAL): 4.7 MMOL-L (ref 3.4–5.1)
POTASSIUM (EXTERNAL): 4.8 MMOL/L (ref 3.4–5.1)
POTASSIUM BLD-SCNC: 3.3 MMOL/L (ref 3.4–5.3)
POTASSIUM BLD-SCNC: 3.3 MMOL/L (ref 3.4–5.3)
POTASSIUM BLD-SCNC: 3.7 MMOL/L (ref 3.4–5.3)
POTASSIUM BLD-SCNC: 3.7 MMOL/L (ref 3.4–5.3)
POTASSIUM BLD-SCNC: 3.8 MMOL/L (ref 3.4–5.3)
POTASSIUM BLD-SCNC: 4 MMOL/L (ref 3.4–5.3)
POTASSIUM BLD-SCNC: 4.2 MMOL/L (ref 3.4–5.3)
POTASSIUM BLD-SCNC: 4.3 MMOL/L (ref 3.4–5.3)
POTASSIUM BLD-SCNC: 4.7 MMOL/L (ref 3.4–5.3)
POTASSIUM BLD-SCNC: 4.8 MMOL/L (ref 3.4–5.3)
POTASSIUM BLD-SCNC: 4.9 MMOL/L (ref 3.4–5.3)
POTASSIUM BLD-SCNC: 4.9 MMOL/L (ref 3.4–5.3)
POTASSIUM BLD-SCNC: 5 MMOL/L (ref 3.4–5.3)
POTASSIUM BLD-SCNC: 5 MMOL/L (ref 3.4–5.3)
POTASSIUM BLD-SCNC: 5.1 MMOL/L (ref 3.4–5.3)
POTASSIUM BLD-SCNC: 5.1 MMOL/L (ref 3.4–5.3)
POTASSIUM BLD-SCNC: 5.2 MMOL/L (ref 3.4–5.3)
POTASSIUM BLD-SCNC: 5.4 MMOL/L (ref 3.4–5.3)
POTASSIUM BLD-SCNC: 5.5 MMOL/L (ref 3.4–5.3)
POTASSIUM BLD-SCNC: 5.7 MMOL/L (ref 3.4–5.3)
PR INTERVAL - MUSE: 132 MS
PROCALCITONIN SERPL-MCNC: 0.06 NG/ML
PROCALCITONIN SERPL-MCNC: 7.2 NG/ML
PROT SERPL-MCNC: 5.6 G/DL (ref 6.8–8.8)
PROT SERPL-MCNC: 7 G/DL (ref 6.8–8.8)
PROT SERPL-MCNC: 7.1 G/DL (ref 6.8–8.8)
QRS DURATION - MUSE: 76 MS
QT - MUSE: 350 MS
QTC - MUSE: 437 MS
R AXIS - MUSE: 47 DEGREES
RADIOLOGIST FLAGS: ABNORMAL
RBC # BLD AUTO: 2.62 10E6/UL (ref 3.8–5.2)
RBC # BLD AUTO: 2.64 10E6/UL (ref 3.8–5.2)
RBC # BLD AUTO: 2.65 10E6/UL (ref 3.8–5.2)
RBC # BLD AUTO: 2.66 10E6/UL (ref 3.8–5.2)
RBC # BLD AUTO: 2.69 10E6/UL (ref 3.8–5.2)
RBC # BLD AUTO: 2.7 10E6/UL (ref 3.8–5.2)
RBC # BLD AUTO: 2.73 10E6/UL (ref 3.8–5.2)
RBC # BLD AUTO: 2.77 10E6/UL (ref 3.8–5.2)
RBC # BLD AUTO: 2.8 10E6/UL (ref 3.8–5.2)
RBC # BLD AUTO: 3.44 10E6/UL (ref 3.8–5.2)
RBC # BLD AUTO: 3.93 10E6/UL (ref 3.8–5.2)
RBC MORPH BLD: ABNORMAL
RBC URINE: 10 /HPF
RBC URINE: 17 /HPF
RBC URINE: 4 /HPF
S PNEUM AG SPEC QL: NEGATIVE
S PNEUM AG SPEC QL: NEGATIVE
SA TARGET DNA: POSITIVE
SODIUM SERPL-SCNC: 140 MMOL/L (ref 133–144)
SODIUM SERPL-SCNC: 140 MMOL/L (ref 133–144)
SODIUM SERPL-SCNC: 141 MMOL/L (ref 133–144)
SODIUM SERPL-SCNC: 142 MMOL/L (ref 133–144)
SODIUM SERPL-SCNC: 143 MMOL/L (ref 133–144)
SODIUM SERPL-SCNC: 144 MMOL/L (ref 133–144)
SODIUM SERPL-SCNC: 149 MMOL/L (ref 133–144)
SODIUM UR-SCNC: 27 MMOL/L
SP GR UR STRIP: 1.01 (ref 1–1.03)
SP GR UR STRIP: 1.02 (ref 1–1.03)
SP GR UR STRIP: 1.03 (ref 1–1.03)
SPECIMEN EXPIRATION DATE: NORMAL
SQUAMOUS EPITHELIAL: 12 /HPF
SQUAMOUS EPITHELIAL: 3 /HPF
STAPHYLOCOCCUS AUREUS: NOT DETECTED
STAPHYLOCOCCUS EPIDERMIDIS: NOT DETECTED
STAPHYLOCOCCUS LUGDUNENSIS: NOT DETECTED
STAPHYLOCOCCUS SPECIES: NOT DETECTED
STREPTOCOCCUS AGALACTIAE: NOT DETECTED
STREPTOCOCCUS ANGINOSUS GROUP: NOT DETECTED
STREPTOCOCCUS PNEUMONIAE: NOT DETECTED
STREPTOCOCCUS PYOGENES: NOT DETECTED
STREPTOCOCCUS SPECIES: NOT DETECTED
SYSTOLIC BLOOD PRESSURE - MUSE: NORMAL MMHG
T AXIS - MUSE: 123 DEGREES
TRANSITIONAL EPI: <1 /HPF
TROPONIN I SERPL-MCNC: 0.07 UG/L (ref 0–0.04)
TROPONIN I SERPL-MCNC: 0.08 UG/L (ref 0–0.04)
TROPONIN I SERPL-MCNC: <0.015 UG/L (ref 0–0.04)
UFH PPP CHRO-ACNC: 0.14 IU/ML
UFH PPP CHRO-ACNC: 0.15 IU/ML
UFH PPP CHRO-ACNC: 0.17 IU/ML
UFH PPP CHRO-ACNC: 0.19 IU/ML
UFH PPP CHRO-ACNC: 0.2 IU/ML
UFH PPP CHRO-ACNC: 0.22 IU/ML
UFH PPP CHRO-ACNC: 0.22 IU/ML
UFH PPP CHRO-ACNC: 0.23 IU/ML
UFH PPP CHRO-ACNC: 0.23 IU/ML
UFH PPP CHRO-ACNC: 0.24 IU/ML
UFH PPP CHRO-ACNC: 0.3 IU/ML
UFH PPP CHRO-ACNC: 0.3 IU/ML
UFH PPP CHRO-ACNC: 0.32 IU/ML
UFH PPP CHRO-ACNC: 0.33 IU/ML
UFH PPP CHRO-ACNC: 0.37 IU/ML
UFH PPP CHRO-ACNC: 0.37 IU/ML
UFH PPP CHRO-ACNC: 0.55 IU/ML
UROBILINOGEN UR STRIP-MCNC: NORMAL MG/DL
VANCOMYCIN SERPL-MCNC: 21.4 MG/L
VANCOMYCIN SERPL-MCNC: 22.8 MG/L
VANCOMYCIN SERPL-MCNC: 23.3 MG/L
VANCOMYCIN SERPL-MCNC: 31.4 MG/L
VENTRICULAR RATE- MUSE: 94 BPM
WBC # BLD AUTO: 10.4 10E3/UL (ref 4–11)
WBC # BLD AUTO: 11.6 10E3/UL (ref 4–11)
WBC # BLD AUTO: 12.1 10E3/UL (ref 4–11)
WBC # BLD AUTO: 12.2 10E3/UL (ref 4–11)
WBC # BLD AUTO: 12.7 10E3/UL (ref 4–11)
WBC # BLD AUTO: 13.2 10E3/UL (ref 4–11)
WBC # BLD AUTO: 14.1 10E3/UL (ref 4–11)
WBC # BLD AUTO: 18.8 10E3/UL (ref 4–11)
WBC # BLD AUTO: 8.3 10E3/UL (ref 4–11)
WBC # BLD AUTO: 9.7 10E3/UL (ref 4–11)
WBC # BLD AUTO: 9.7 10E3/UL (ref 4–11)
WBC CLUMPS #/AREA URNS HPF: PRESENT /HPF
WBC URINE: 1 /HPF
WBC URINE: 10 /HPF
WBC URINE: 84 /HPF

## 2021-01-01 PROCEDURE — 258N000003 HC RX IP 258 OP 636: Performed by: NURSE PRACTITIONER

## 2021-01-01 PROCEDURE — 250N000012 HC RX MED GY IP 250 OP 636 PS 637: Performed by: NURSE PRACTITIONER

## 2021-01-01 PROCEDURE — 94640 AIRWAY INHALATION TREATMENT: CPT

## 2021-01-01 PROCEDURE — 250N000011 HC RX IP 250 OP 636: Performed by: SURGERY

## 2021-01-01 PROCEDURE — 76770 US EXAM ABDO BACK WALL COMP: CPT | Mod: 26 | Performed by: RADIOLOGY

## 2021-01-01 PROCEDURE — 250N000011 HC RX IP 250 OP 636: Performed by: NURSE PRACTITIONER

## 2021-01-01 PROCEDURE — 36592 COLLECT BLOOD FROM PICC: CPT | Performed by: SURGERY

## 2021-01-01 PROCEDURE — 82310 ASSAY OF CALCIUM: CPT | Performed by: NURSE PRACTITIONER

## 2021-01-01 PROCEDURE — 250N000011 HC RX IP 250 OP 636: Performed by: STUDENT IN AN ORGANIZED HEALTH CARE EDUCATION/TRAINING PROGRAM

## 2021-01-01 PROCEDURE — 86140 C-REACTIVE PROTEIN: CPT | Performed by: STUDENT IN AN ORGANIZED HEALTH CARE EDUCATION/TRAINING PROGRAM

## 2021-01-01 PROCEDURE — 200N000002 HC R&B ICU UMMC

## 2021-01-01 PROCEDURE — 82805 BLOOD GASES W/O2 SATURATION: CPT | Performed by: NURSE PRACTITIONER

## 2021-01-01 PROCEDURE — 83735 ASSAY OF MAGNESIUM: CPT | Performed by: STUDENT IN AN ORGANIZED HEALTH CARE EDUCATION/TRAINING PROGRAM

## 2021-01-01 PROCEDURE — 80048 BASIC METABOLIC PNL TOTAL CA: CPT | Performed by: NURSE PRACTITIONER

## 2021-01-01 PROCEDURE — 82803 BLOOD GASES ANY COMBINATION: CPT | Performed by: INTERNAL MEDICINE

## 2021-01-01 PROCEDURE — 250N000013 HC RX MED GY IP 250 OP 250 PS 637: Performed by: NURSE PRACTITIONER

## 2021-01-01 PROCEDURE — 87899 AGENT NOS ASSAY W/OPTIC: CPT | Performed by: NURSE PRACTITIONER

## 2021-01-01 PROCEDURE — 94640 AIRWAY INHALATION TREATMENT: CPT | Mod: 76

## 2021-01-01 PROCEDURE — 250N000012 HC RX MED GY IP 250 OP 636 PS 637: Performed by: STUDENT IN AN ORGANIZED HEALTH CARE EDUCATION/TRAINING PROGRAM

## 2021-01-01 PROCEDURE — 250N000013 HC RX MED GY IP 250 OP 250 PS 637: Performed by: STUDENT IN AN ORGANIZED HEALTH CARE EDUCATION/TRAINING PROGRAM

## 2021-01-01 PROCEDURE — 36415 COLL VENOUS BLD VENIPUNCTURE: CPT | Performed by: SURGERY

## 2021-01-01 PROCEDURE — 250N000009 HC RX 250: Performed by: STUDENT IN AN ORGANIZED HEALTH CARE EDUCATION/TRAINING PROGRAM

## 2021-01-01 PROCEDURE — 258N000003 HC RX IP 258 OP 636: Performed by: INTERNAL MEDICINE

## 2021-01-01 PROCEDURE — 82565 ASSAY OF CREATININE: CPT | Performed by: INTERNAL MEDICINE

## 2021-01-01 PROCEDURE — 999N000157 HC STATISTIC RCP TIME EA 10 MIN

## 2021-01-01 PROCEDURE — 84100 ASSAY OF PHOSPHORUS: CPT | Performed by: STUDENT IN AN ORGANIZED HEALTH CARE EDUCATION/TRAINING PROGRAM

## 2021-01-01 PROCEDURE — 250N000012 HC RX MED GY IP 250 OP 636 PS 637: Performed by: INTERNAL MEDICINE

## 2021-01-01 PROCEDURE — 82330 ASSAY OF CALCIUM: CPT | Performed by: STUDENT IN AN ORGANIZED HEALTH CARE EDUCATION/TRAINING PROGRAM

## 2021-01-01 PROCEDURE — 71045 X-RAY EXAM CHEST 1 VIEW: CPT | Mod: 26 | Performed by: RADIOLOGY

## 2021-01-01 PROCEDURE — 83520 IMMUNOASSAY QUANT NOS NONAB: CPT | Performed by: CLINICAL NURSE SPECIALIST

## 2021-01-01 PROCEDURE — 250N000011 HC RX IP 250 OP 636: Performed by: INTERNAL MEDICINE

## 2021-01-01 PROCEDURE — 83605 ASSAY OF LACTIC ACID: CPT | Performed by: INTERNAL MEDICINE

## 2021-01-01 PROCEDURE — 99231 SBSQ HOSP IP/OBS SF/LOW 25: CPT | Mod: GC | Performed by: STUDENT IN AN ORGANIZED HEALTH CARE EDUCATION/TRAINING PROGRAM

## 2021-01-01 PROCEDURE — 84100 ASSAY OF PHOSPHORUS: CPT | Performed by: INTERNAL MEDICINE

## 2021-01-01 PROCEDURE — 87899 AGENT NOS ASSAY W/OPTIC: CPT | Performed by: STUDENT IN AN ORGANIZED HEALTH CARE EDUCATION/TRAINING PROGRAM

## 2021-01-01 PROCEDURE — 84484 ASSAY OF TROPONIN QUANT: CPT | Performed by: STUDENT IN AN ORGANIZED HEALTH CARE EDUCATION/TRAINING PROGRAM

## 2021-01-01 PROCEDURE — 36415 COLL VENOUS BLD VENIPUNCTURE: CPT | Performed by: STUDENT IN AN ORGANIZED HEALTH CARE EDUCATION/TRAINING PROGRAM

## 2021-01-01 PROCEDURE — 250N000009 HC RX 250: Performed by: INTERNAL MEDICINE

## 2021-01-01 PROCEDURE — 999N000127 HC STATISTIC PERIPHERAL IV START W US GUIDANCE

## 2021-01-01 PROCEDURE — 999N000015 HC STATISTIC ARTERIAL MONITORING DAILY

## 2021-01-01 PROCEDURE — 85018 HEMOGLOBIN: CPT | Performed by: STUDENT IN AN ORGANIZED HEALTH CARE EDUCATION/TRAINING PROGRAM

## 2021-01-01 PROCEDURE — 85520 HEPARIN ASSAY: CPT | Performed by: STUDENT IN AN ORGANIZED HEALTH CARE EDUCATION/TRAINING PROGRAM

## 2021-01-01 PROCEDURE — 272N000010 HC KIT CATH ARTERIAL EXT SUPPLY

## 2021-01-01 PROCEDURE — 87040 BLOOD CULTURE FOR BACTERIA: CPT | Performed by: STUDENT IN AN ORGANIZED HEALTH CARE EDUCATION/TRAINING PROGRAM

## 2021-01-01 PROCEDURE — 80202 ASSAY OF VANCOMYCIN: CPT | Performed by: STUDENT IN AN ORGANIZED HEALTH CARE EDUCATION/TRAINING PROGRAM

## 2021-01-01 PROCEDURE — 82550 ASSAY OF CK (CPK): CPT | Performed by: STUDENT IN AN ORGANIZED HEALTH CARE EDUCATION/TRAINING PROGRAM

## 2021-01-01 PROCEDURE — 94003 VENT MGMT INPAT SUBQ DAY: CPT

## 2021-01-01 PROCEDURE — 99233 SBSQ HOSP IP/OBS HIGH 50: CPT | Mod: 24 | Performed by: INTERNAL MEDICINE

## 2021-01-01 PROCEDURE — 81001 URINALYSIS AUTO W/SCOPE: CPT | Performed by: STUDENT IN AN ORGANIZED HEALTH CARE EDUCATION/TRAINING PROGRAM

## 2021-01-01 PROCEDURE — 83735 ASSAY OF MAGNESIUM: CPT | Performed by: NURSE PRACTITIONER

## 2021-01-01 PROCEDURE — 87040 BLOOD CULTURE FOR BACTERIA: CPT | Performed by: NURSE PRACTITIONER

## 2021-01-01 PROCEDURE — 5A1955Z RESPIRATORY VENTILATION, GREATER THAN 96 CONSECUTIVE HOURS: ICD-10-PCS | Performed by: INTERNAL MEDICINE

## 2021-01-01 PROCEDURE — 85027 COMPLETE CBC AUTOMATED: CPT | Performed by: STUDENT IN AN ORGANIZED HEALTH CARE EDUCATION/TRAINING PROGRAM

## 2021-01-01 PROCEDURE — 74018 RADEX ABDOMEN 1 VIEW: CPT | Mod: 26 | Performed by: RADIOLOGY

## 2021-01-01 PROCEDURE — 99223 1ST HOSP IP/OBS HIGH 75: CPT | Mod: 24 | Performed by: INTERNAL MEDICINE

## 2021-01-01 PROCEDURE — 250N000013 HC RX MED GY IP 250 OP 250 PS 637: Performed by: INTERNAL MEDICINE

## 2021-01-01 PROCEDURE — 99291 CRITICAL CARE FIRST HOUR: CPT | Performed by: STUDENT IN AN ORGANIZED HEALTH CARE EDUCATION/TRAINING PROGRAM

## 2021-01-01 PROCEDURE — 272N000103 HC INTRODUCER MICRO SET

## 2021-01-01 PROCEDURE — 76770 US EXAM ABDO BACK WALL COMP: CPT

## 2021-01-01 PROCEDURE — 36569 INSJ PICC 5 YR+ W/O IMAGING: CPT

## 2021-01-01 PROCEDURE — 82962 GLUCOSE BLOOD TEST: CPT

## 2021-01-01 PROCEDURE — 83036 HEMOGLOBIN GLYCOSYLATED A1C: CPT | Performed by: STUDENT IN AN ORGANIZED HEALTH CARE EDUCATION/TRAINING PROGRAM

## 2021-01-01 PROCEDURE — 82805 BLOOD GASES W/O2 SATURATION: CPT | Performed by: INTERNAL MEDICINE

## 2021-01-01 PROCEDURE — 85384 FIBRINOGEN ACTIVITY: CPT | Performed by: STUDENT IN AN ORGANIZED HEALTH CARE EDUCATION/TRAINING PROGRAM

## 2021-01-01 PROCEDURE — 71045 X-RAY EXAM CHEST 1 VIEW: CPT

## 2021-01-01 PROCEDURE — 87086 URINE CULTURE/COLONY COUNT: CPT | Performed by: NURSE PRACTITIONER

## 2021-01-01 PROCEDURE — 80053 COMPREHEN METABOLIC PANEL: CPT | Performed by: STUDENT IN AN ORGANIZED HEALTH CARE EDUCATION/TRAINING PROGRAM

## 2021-01-01 PROCEDURE — 83735 ASSAY OF MAGNESIUM: CPT | Performed by: INTERNAL MEDICINE

## 2021-01-01 PROCEDURE — 94645 CONT INHLJ TX EACH ADDL HOUR: CPT

## 2021-01-01 PROCEDURE — 80048 BASIC METABOLIC PNL TOTAL CA: CPT | Performed by: STUDENT IN AN ORGANIZED HEALTH CARE EDUCATION/TRAINING PROGRAM

## 2021-01-01 PROCEDURE — 99291 CRITICAL CARE FIRST HOUR: CPT | Mod: GC | Performed by: INTERNAL MEDICINE

## 2021-01-01 PROCEDURE — C9113 INJ PANTOPRAZOLE SODIUM, VIA: HCPCS | Performed by: STUDENT IN AN ORGANIZED HEALTH CARE EDUCATION/TRAINING PROGRAM

## 2021-01-01 PROCEDURE — 82374 ASSAY BLOOD CARBON DIOXIDE: CPT | Performed by: STUDENT IN AN ORGANIZED HEALTH CARE EDUCATION/TRAINING PROGRAM

## 2021-01-01 PROCEDURE — 93010 ELECTROCARDIOGRAM REPORT: CPT | Performed by: INTERNAL MEDICINE

## 2021-01-01 PROCEDURE — 250N000011 HC RX IP 250 OP 636: Performed by: CLINICAL NURSE SPECIALIST

## 2021-01-01 PROCEDURE — 80202 ASSAY OF VANCOMYCIN: CPT | Performed by: SURGERY

## 2021-01-01 PROCEDURE — 82310 ASSAY OF CALCIUM: CPT | Performed by: STUDENT IN AN ORGANIZED HEALTH CARE EDUCATION/TRAINING PROGRAM

## 2021-01-01 PROCEDURE — 84295 ASSAY OF SERUM SODIUM: CPT | Performed by: STUDENT IN AN ORGANIZED HEALTH CARE EDUCATION/TRAINING PROGRAM

## 2021-01-01 PROCEDURE — 272N000452 HC KIT SHRLOCK 5FR POWER PICC TRIPLE LUMEN

## 2021-01-01 PROCEDURE — 250N000009 HC RX 250: Performed by: CLINICAL NURSE SPECIALIST

## 2021-01-01 PROCEDURE — 99291 CRITICAL CARE FIRST HOUR: CPT | Performed by: INTERNAL MEDICINE

## 2021-01-01 PROCEDURE — 85379 FIBRIN DEGRADATION QUANT: CPT | Performed by: STUDENT IN AN ORGANIZED HEALTH CARE EDUCATION/TRAINING PROGRAM

## 2021-01-01 PROCEDURE — 258N000001 HC RX 258: Performed by: STUDENT IN AN ORGANIZED HEALTH CARE EDUCATION/TRAINING PROGRAM

## 2021-01-01 PROCEDURE — 85610 PROTHROMBIN TIME: CPT | Performed by: STUDENT IN AN ORGANIZED HEALTH CARE EDUCATION/TRAINING PROGRAM

## 2021-01-01 PROCEDURE — 84155 ASSAY OF PROTEIN SERUM: CPT | Performed by: STUDENT IN AN ORGANIZED HEALTH CARE EDUCATION/TRAINING PROGRAM

## 2021-01-01 PROCEDURE — 82803 BLOOD GASES ANY COMBINATION: CPT | Performed by: STUDENT IN AN ORGANIZED HEALTH CARE EDUCATION/TRAINING PROGRAM

## 2021-01-01 PROCEDURE — 93970 EXTREMITY STUDY: CPT

## 2021-01-01 PROCEDURE — 99291 CRITICAL CARE FIRST HOUR: CPT | Mod: GC | Performed by: STUDENT IN AN ORGANIZED HEALTH CARE EDUCATION/TRAINING PROGRAM

## 2021-01-01 PROCEDURE — 258N000003 HC RX IP 258 OP 636: Performed by: SURGERY

## 2021-01-01 PROCEDURE — 82805 BLOOD GASES W/O2 SATURATION: CPT | Performed by: SURGERY

## 2021-01-01 PROCEDURE — 87077 CULTURE AEROBIC IDENTIFY: CPT | Performed by: STUDENT IN AN ORGANIZED HEALTH CARE EDUCATION/TRAINING PROGRAM

## 2021-01-01 PROCEDURE — 87149 DNA/RNA DIRECT PROBE: CPT | Performed by: SURGERY

## 2021-01-01 PROCEDURE — 999N000065 XR ABDOMEN PORT 1 VIEWS

## 2021-01-01 PROCEDURE — 85730 THROMBOPLASTIN TIME PARTIAL: CPT | Performed by: STUDENT IN AN ORGANIZED HEALTH CARE EDUCATION/TRAINING PROGRAM

## 2021-01-01 PROCEDURE — 272N000019 HC KIT OPEN ENDED DOUBLE LUMEN

## 2021-01-01 PROCEDURE — 84145 PROCALCITONIN (PCT): CPT | Performed by: STUDENT IN AN ORGANIZED HEALTH CARE EDUCATION/TRAINING PROGRAM

## 2021-01-01 PROCEDURE — 3E043XZ INTRODUCTION OF VASOPRESSOR INTO CENTRAL VEIN, PERCUTANEOUS APPROACH: ICD-10-PCS | Performed by: INTERNAL MEDICINE

## 2021-01-01 PROCEDURE — 99221 1ST HOSP IP/OBS SF/LOW 40: CPT | Mod: AI | Performed by: SURGERY

## 2021-01-01 PROCEDURE — 272N000201 ZZ HC ADHESIVE SKIN CLOSURE, DERMABOND

## 2021-01-01 PROCEDURE — G0463 HOSPITAL OUTPT CLINIC VISIT: HCPCS

## 2021-01-01 PROCEDURE — 85014 HEMATOCRIT: CPT | Performed by: SURGERY

## 2021-01-01 PROCEDURE — 94002 VENT MGMT INPAT INIT DAY: CPT

## 2021-01-01 PROCEDURE — 87077 CULTURE AEROBIC IDENTIFY: CPT | Performed by: SURGERY

## 2021-01-01 PROCEDURE — 84295 ASSAY OF SERUM SODIUM: CPT | Performed by: INTERNAL MEDICINE

## 2021-01-01 PROCEDURE — 86901 BLOOD TYPING SEROLOGIC RH(D): CPT | Performed by: STUDENT IN AN ORGANIZED HEALTH CARE EDUCATION/TRAINING PROGRAM

## 2021-01-01 PROCEDURE — 81001 URINALYSIS AUTO W/SCOPE: CPT | Performed by: NURSE PRACTITIONER

## 2021-01-01 PROCEDURE — 84100 ASSAY OF PHOSPHORUS: CPT | Performed by: SURGERY

## 2021-01-01 PROCEDURE — 84132 ASSAY OF SERUM POTASSIUM: CPT | Performed by: STUDENT IN AN ORGANIZED HEALTH CARE EDUCATION/TRAINING PROGRAM

## 2021-01-01 PROCEDURE — 87106 FUNGI IDENTIFICATION YEAST: CPT | Performed by: NURSE PRACTITIONER

## 2021-01-01 PROCEDURE — 272N000272 HC CONTINUOUS NEBULIZER MICRO PUMP

## 2021-01-01 PROCEDURE — 83605 ASSAY OF LACTIC ACID: CPT | Performed by: SURGERY

## 2021-01-01 PROCEDURE — 93306 TTE W/DOPPLER COMPLETE: CPT | Mod: 26 | Performed by: INTERNAL MEDICINE

## 2021-01-01 PROCEDURE — 84300 ASSAY OF URINE SODIUM: CPT | Performed by: INTERNAL MEDICINE

## 2021-01-01 PROCEDURE — 93005 ELECTROCARDIOGRAM TRACING: CPT

## 2021-01-01 PROCEDURE — 83605 ASSAY OF LACTIC ACID: CPT | Performed by: STUDENT IN AN ORGANIZED HEALTH CARE EDUCATION/TRAINING PROGRAM

## 2021-01-01 PROCEDURE — 87205 SMEAR GRAM STAIN: CPT | Performed by: STUDENT IN AN ORGANIZED HEALTH CARE EDUCATION/TRAINING PROGRAM

## 2021-01-01 PROCEDURE — 80069 RENAL FUNCTION PANEL: CPT | Performed by: STUDENT IN AN ORGANIZED HEALTH CARE EDUCATION/TRAINING PROGRAM

## 2021-01-01 PROCEDURE — 36592 COLLECT BLOOD FROM PICC: CPT | Performed by: STUDENT IN AN ORGANIZED HEALTH CARE EDUCATION/TRAINING PROGRAM

## 2021-01-01 PROCEDURE — 82040 ASSAY OF SERUM ALBUMIN: CPT | Performed by: NURSE PRACTITIONER

## 2021-01-01 PROCEDURE — 250N000011 HC RX IP 250 OP 636

## 2021-01-01 PROCEDURE — 93970 EXTREMITY STUDY: CPT | Mod: 26 | Performed by: STUDENT IN AN ORGANIZED HEALTH CARE EDUCATION/TRAINING PROGRAM

## 2021-01-01 PROCEDURE — 81001 URINALYSIS AUTO W/SCOPE: CPT | Performed by: INTERNAL MEDICINE

## 2021-01-01 PROCEDURE — 80053 COMPREHEN METABOLIC PANEL: CPT | Performed by: NURSE PRACTITIONER

## 2021-01-01 PROCEDURE — 99291 CRITICAL CARE FIRST HOUR: CPT | Mod: 25 | Performed by: INTERNAL MEDICINE

## 2021-01-01 PROCEDURE — 87641 MR-STAPH DNA AMP PROBE: CPT | Performed by: STUDENT IN AN ORGANIZED HEALTH CARE EDUCATION/TRAINING PROGRAM

## 2021-01-01 PROCEDURE — 86140 C-REACTIVE PROTEIN: CPT | Performed by: SURGERY

## 2021-01-01 PROCEDURE — 99222 1ST HOSP IP/OBS MODERATE 55: CPT | Performed by: STUDENT IN AN ORGANIZED HEALTH CARE EDUCATION/TRAINING PROGRAM

## 2021-01-01 PROCEDURE — 99238 HOSP IP/OBS DSCHRG MGMT 30/<: CPT | Mod: GC | Performed by: INTERNAL MEDICINE

## 2021-01-01 PROCEDURE — 999N000065 XR CHEST PORT 1 VIEW

## 2021-01-01 PROCEDURE — 999N000208 ECHOCARDIOGRAM COMPLETE

## 2021-01-01 PROCEDURE — 36415 COLL VENOUS BLD VENIPUNCTURE: CPT | Performed by: NURSE PRACTITIONER

## 2021-01-01 PROCEDURE — 255N000002 HC RX 255 OP 636: Performed by: STUDENT IN AN ORGANIZED HEALTH CARE EDUCATION/TRAINING PROGRAM

## 2021-01-01 PROCEDURE — 87077 CULTURE AEROBIC IDENTIFY: CPT | Performed by: NURSE PRACTITIONER

## 2021-01-01 PROCEDURE — 99207 PR CONSULT E&M CHANGED TO INITIAL LEVEL: CPT | Performed by: STUDENT IN AN ORGANIZED HEALTH CARE EDUCATION/TRAINING PROGRAM

## 2021-01-01 PROCEDURE — 83690 ASSAY OF LIPASE: CPT | Performed by: STUDENT IN AN ORGANIZED HEALTH CARE EDUCATION/TRAINING PROGRAM

## 2021-01-01 PROCEDURE — 36600 WITHDRAWAL OF ARTERIAL BLOOD: CPT

## 2021-01-01 PROCEDURE — 272N000278 HC DEVICE 5FR SECURACATH

## 2021-01-01 PROCEDURE — 250N000009 HC RX 250

## 2021-01-01 PROCEDURE — P9041 ALBUMIN (HUMAN),5%, 50ML: HCPCS | Performed by: STUDENT IN AN ORGANIZED HEALTH CARE EDUCATION/TRAINING PROGRAM

## 2021-01-01 PROCEDURE — 36620 INSERTION CATHETER ARTERY: CPT | Mod: GC | Performed by: INTERNAL MEDICINE

## 2021-01-01 RX ORDER — FUROSEMIDE 10 MG/ML
120 INJECTION INTRAMUSCULAR; INTRAVENOUS ONCE
Status: COMPLETED | OUTPATIENT
Start: 2021-01-01 | End: 2021-01-01

## 2021-01-01 RX ORDER — AMINO AC/PROTEIN HYDR/WHEY PRO 10G-100/30
2 LIQUID (ML) ORAL 2 TIMES DAILY
Status: DISCONTINUED | OUTPATIENT
Start: 2021-01-01 | End: 2021-01-01

## 2021-01-01 RX ORDER — MINERAL OIL AND WHITE PETROLATUM 150; 830 MG/G; MG/G
1 OINTMENT OPHTHALMIC EVERY 8 HOURS
Status: DISCONTINUED | OUTPATIENT
Start: 2021-01-01 | End: 2021-01-01 | Stop reason: HOSPADM

## 2021-01-01 RX ORDER — DEXTROSE MONOHYDRATE 25 G/50ML
25-50 INJECTION, SOLUTION INTRAVENOUS
Status: DISCONTINUED | OUTPATIENT
Start: 2021-01-01 | End: 2021-01-01

## 2021-01-01 RX ORDER — FUROSEMIDE 10 MG/ML
80 INJECTION INTRAMUSCULAR; INTRAVENOUS EVERY 6 HOURS
Status: DISCONTINUED | OUTPATIENT
Start: 2021-01-01 | End: 2021-01-01

## 2021-01-01 RX ORDER — MIDAZOLAM HCL IN 0.9 % NACL/PF 1 MG/ML
1-15 PLASTIC BAG, INJECTION (ML) INTRAVENOUS CONTINUOUS
Status: DISCONTINUED | OUTPATIENT
Start: 2021-01-01 | End: 2021-01-01

## 2021-01-01 RX ORDER — HYDROMORPHONE HYDROCHLORIDE 1 MG/ML
0.3 INJECTION, SOLUTION INTRAMUSCULAR; INTRAVENOUS; SUBCUTANEOUS EVERY 10 MIN PRN
Status: DISCONTINUED | OUTPATIENT
Start: 2021-01-01 | End: 2021-01-01 | Stop reason: HOSPADM

## 2021-01-01 RX ORDER — AMOXICILLIN 250 MG
2 CAPSULE ORAL 2 TIMES DAILY
Status: DISCONTINUED | OUTPATIENT
Start: 2021-01-01 | End: 2021-01-01 | Stop reason: HOSPADM

## 2021-01-01 RX ORDER — DEXAMETHASONE SODIUM PHOSPHATE 4 MG/ML
6 INJECTION, SOLUTION INTRA-ARTICULAR; INTRALESIONAL; INTRAMUSCULAR; INTRAVENOUS; SOFT TISSUE DAILY
Status: DISCONTINUED | OUTPATIENT
Start: 2021-01-01 | End: 2021-01-01

## 2021-01-01 RX ORDER — DEXAMETHASONE SODIUM PHOSPHATE 4 MG/ML
6 INJECTION, SOLUTION INTRA-ARTICULAR; INTRALESIONAL; INTRAMUSCULAR; INTRAVENOUS; SOFT TISSUE DAILY
Status: COMPLETED | OUTPATIENT
Start: 2021-01-01 | End: 2021-01-01

## 2021-01-01 RX ORDER — GABAPENTIN 250 MG/5ML
800 SOLUTION ORAL EVERY 8 HOURS SCHEDULED
Status: DISCONTINUED | OUTPATIENT
Start: 2021-01-01 | End: 2021-01-01

## 2021-01-01 RX ORDER — CEFTRIAXONE 2 G/1
2 INJECTION, POWDER, FOR SOLUTION INTRAMUSCULAR; INTRAVENOUS EVERY 24 HOURS
Status: DISCONTINUED | OUTPATIENT
Start: 2021-01-01 | End: 2021-01-01 | Stop reason: HOSPADM

## 2021-01-01 RX ORDER — DEXTROSE MONOHYDRATE 100 MG/ML
INJECTION, SOLUTION INTRAVENOUS CONTINUOUS PRN
Status: DISCONTINUED | OUTPATIENT
Start: 2021-01-01 | End: 2021-01-01 | Stop reason: HOSPADM

## 2021-01-01 RX ORDER — CALCIUM GLUCONATE 20 MG/ML
2 INJECTION, SOLUTION INTRAVENOUS ONCE
Status: COMPLETED | OUTPATIENT
Start: 2021-01-01 | End: 2021-01-01

## 2021-01-01 RX ORDER — GUAIFENESIN 600 MG/1
15 TABLET, EXTENDED RELEASE ORAL DAILY
Status: DISCONTINUED | OUTPATIENT
Start: 2021-01-01 | End: 2021-01-01 | Stop reason: HOSPADM

## 2021-01-01 RX ORDER — VANCOMYCIN HYDROCHLORIDE 1 G/200ML
1000 INJECTION, SOLUTION INTRAVENOUS ONCE
Status: COMPLETED | OUTPATIENT
Start: 2021-01-01 | End: 2021-01-01

## 2021-01-01 RX ORDER — TRAZODONE HYDROCHLORIDE 150 MG/1
300 TABLET ORAL AT BEDTIME
COMMUNITY

## 2021-01-01 RX ORDER — FUROSEMIDE 10 MG/ML
100 INJECTION INTRAMUSCULAR; INTRAVENOUS EVERY 6 HOURS
Status: COMPLETED | OUTPATIENT
Start: 2021-01-01 | End: 2021-01-01

## 2021-01-01 RX ORDER — LIDOCAINE 40 MG/G
CREAM TOPICAL
Status: ACTIVE | OUTPATIENT
Start: 2021-01-01 | End: 2021-01-01

## 2021-01-01 RX ORDER — FUROSEMIDE 10 MG/ML
120 INJECTION INTRAMUSCULAR; INTRAVENOUS EVERY 6 HOURS
Status: COMPLETED | OUTPATIENT
Start: 2021-01-01 | End: 2021-01-01

## 2021-01-01 RX ORDER — AMINO AC/PROTEIN HYDR/WHEY PRO 10G-100/30
2 LIQUID (ML) ORAL 3 TIMES DAILY
Status: DISCONTINUED | OUTPATIENT
Start: 2021-01-01 | End: 2021-01-01 | Stop reason: HOSPADM

## 2021-01-01 RX ORDER — NICOTINE POLACRILEX 4 MG
15-30 LOZENGE BUCCAL
Status: DISCONTINUED | OUTPATIENT
Start: 2021-01-01 | End: 2021-01-01

## 2021-01-01 RX ORDER — HEPARIN SODIUM 10000 [USP'U]/100ML
0-5000 INJECTION, SOLUTION INTRAVENOUS CONTINUOUS
Status: DISCONTINUED | OUTPATIENT
Start: 2021-01-01 | End: 2021-01-01

## 2021-01-01 RX ORDER — POLYETHYLENE GLYCOL 3350 17 G/17G
17 POWDER, FOR SOLUTION ORAL DAILY
Status: DISCONTINUED | OUTPATIENT
Start: 2021-01-01 | End: 2021-01-01 | Stop reason: HOSPADM

## 2021-01-01 RX ORDER — ONDANSETRON 4 MG/1
4 TABLET, ORALLY DISINTEGRATING ORAL EVERY 6 HOURS PRN
Status: DISCONTINUED | OUTPATIENT
Start: 2021-01-01 | End: 2021-01-01 | Stop reason: HOSPADM

## 2021-01-01 RX ORDER — PROPOFOL 10 MG/ML
INJECTION, EMULSION INTRAVENOUS
Status: COMPLETED
Start: 2021-01-01 | End: 2021-01-01

## 2021-01-01 RX ORDER — HYDROMORPHONE HYDROCHLORIDE 4 MG/1
4 TABLET ORAL EVERY 4 HOURS
Status: DISCONTINUED | OUTPATIENT
Start: 2021-01-01 | End: 2021-01-01 | Stop reason: HOSPADM

## 2021-01-01 RX ORDER — PROPOFOL 10 MG/ML
5-75 INJECTION, EMULSION INTRAVENOUS CONTINUOUS
Status: DISCONTINUED | OUTPATIENT
Start: 2021-01-01 | End: 2021-01-01 | Stop reason: HOSPADM

## 2021-01-01 RX ORDER — DEXTROSE MONOHYDRATE 25 G/50ML
25-50 INJECTION, SOLUTION INTRAVENOUS
Status: DISCONTINUED | OUTPATIENT
Start: 2021-01-01 | End: 2021-01-01 | Stop reason: HOSPADM

## 2021-01-01 RX ORDER — GABAPENTIN 800 MG/1
800 TABLET ORAL 3 TIMES DAILY
COMMUNITY

## 2021-01-01 RX ORDER — GABAPENTIN 250 MG/5ML
300 SOLUTION ORAL EVERY 8 HOURS SCHEDULED
Status: DISCONTINUED | OUTPATIENT
Start: 2021-01-01 | End: 2021-01-01 | Stop reason: HOSPADM

## 2021-01-01 RX ORDER — DEXTROSE MONOHYDRATE 100 MG/ML
INJECTION, SOLUTION INTRAVENOUS CONTINUOUS
Status: DISCONTINUED | OUTPATIENT
Start: 2021-01-01 | End: 2021-01-01

## 2021-01-01 RX ORDER — PIPERACILLIN SODIUM, TAZOBACTAM SODIUM 3; .375 G/15ML; G/15ML
3.38 INJECTION, POWDER, LYOPHILIZED, FOR SOLUTION INTRAVENOUS EVERY 6 HOURS
Status: DISCONTINUED | OUTPATIENT
Start: 2021-01-01 | End: 2021-01-01

## 2021-01-01 RX ORDER — NICOTINE POLACRILEX 4 MG
15-30 LOZENGE BUCCAL
Status: DISCONTINUED | OUTPATIENT
Start: 2021-01-01 | End: 2021-01-01 | Stop reason: CLARIF

## 2021-01-01 RX ORDER — DEXTROSE MONOHYDRATE 25 G/50ML
25-50 INJECTION, SOLUTION INTRAVENOUS
Status: DISCONTINUED | OUTPATIENT
Start: 2021-01-01 | End: 2021-01-01 | Stop reason: CLARIF

## 2021-01-01 RX ORDER — POTASSIUM CHLORIDE 1.5 G/1.58G
20 POWDER, FOR SOLUTION ORAL ONCE
Status: DISCONTINUED | OUTPATIENT
Start: 2021-01-01 | End: 2021-01-01 | Stop reason: HOSPADM

## 2021-01-01 RX ORDER — HEPARIN SODIUM 10000 [USP'U]/100ML
0-5000 INJECTION, SOLUTION INTRAVENOUS CONTINUOUS
Status: ACTIVE | OUTPATIENT
Start: 2021-01-01 | End: 2021-01-01

## 2021-01-01 RX ORDER — CALCIUM GLUCONATE 20 MG/ML
1 INJECTION, SOLUTION INTRAVENOUS ONCE
Status: COMPLETED | OUTPATIENT
Start: 2021-01-01 | End: 2021-01-01

## 2021-01-01 RX ORDER — ALBUMIN, HUMAN INJ 5% 5 %
50 SOLUTION INTRAVENOUS ONCE
Status: COMPLETED | OUTPATIENT
Start: 2021-01-01 | End: 2021-01-01

## 2021-01-01 RX ORDER — DEXTROSE MONOHYDRATE 25 G/50ML
25 INJECTION, SOLUTION INTRAVENOUS ONCE
Status: DISCONTINUED | OUTPATIENT
Start: 2021-01-01 | End: 2021-01-01 | Stop reason: CLARIF

## 2021-01-01 RX ORDER — POTASSIUM CHLORIDE 20MEQ/15ML
40 LIQUID (ML) ORAL ONCE
Status: COMPLETED | OUTPATIENT
Start: 2021-01-01 | End: 2021-01-01

## 2021-01-01 RX ORDER — NICOTINE POLACRILEX 4 MG
15-30 LOZENGE BUCCAL
Status: DISCONTINUED | OUTPATIENT
Start: 2021-01-01 | End: 2021-01-01 | Stop reason: HOSPADM

## 2021-01-01 RX ORDER — PROPOFOL 10 MG/ML
5-75 INJECTION, EMULSION INTRAVENOUS CONTINUOUS
Status: DISCONTINUED | OUTPATIENT
Start: 2021-01-01 | End: 2021-01-01

## 2021-01-01 RX ORDER — POTASSIUM CHLORIDE 29.8 MG/ML
20 INJECTION INTRAVENOUS
Status: COMPLETED | OUTPATIENT
Start: 2021-01-01 | End: 2021-01-01

## 2021-01-01 RX ORDER — ATROPINE SULFATE 10 MG/ML
1 SOLUTION/ DROPS OPHTHALMIC
Status: DISCONTINUED | OUTPATIENT
Start: 2021-01-01 | End: 2021-01-01 | Stop reason: HOSPADM

## 2021-01-01 RX ORDER — NALOXONE HYDROCHLORIDE 0.4 MG/ML
0.4 INJECTION, SOLUTION INTRAMUSCULAR; INTRAVENOUS; SUBCUTANEOUS
Status: DISCONTINUED | OUTPATIENT
Start: 2021-01-01 | End: 2021-01-01 | Stop reason: HOSPADM

## 2021-01-01 RX ORDER — GLYCOPYRROLATE 0.2 MG/ML
0.2 INJECTION, SOLUTION INTRAMUSCULAR; INTRAVENOUS ONCE
Status: DISCONTINUED | OUTPATIENT
Start: 2021-01-01 | End: 2021-01-01 | Stop reason: HOSPADM

## 2021-01-01 RX ORDER — IPRATROPIUM BROMIDE AND ALBUTEROL SULFATE 2.5; .5 MG/3ML; MG/3ML
3 SOLUTION RESPIRATORY (INHALATION) EVERY 4 HOURS PRN
Status: DISCONTINUED | OUTPATIENT
Start: 2021-01-01 | End: 2021-01-01 | Stop reason: HOSPADM

## 2021-01-01 RX ORDER — FUROSEMIDE 10 MG/ML
80 INJECTION INTRAMUSCULAR; INTRAVENOUS ONCE
Status: COMPLETED | OUTPATIENT
Start: 2021-01-01 | End: 2021-01-01

## 2021-01-01 RX ORDER — FUROSEMIDE 10 MG/ML
120 INJECTION INTRAMUSCULAR; INTRAVENOUS EVERY 6 HOURS
Status: DISCONTINUED | OUTPATIENT
Start: 2021-01-01 | End: 2021-01-01

## 2021-01-01 RX ORDER — LORAZEPAM 2 MG/ML
1-4 INJECTION INTRAMUSCULAR ONCE
Status: DISCONTINUED | OUTPATIENT
Start: 2021-01-01 | End: 2021-01-01 | Stop reason: HOSPADM

## 2021-01-01 RX ORDER — NOREPINEPHRINE BITARTRATE 0.06 MG/ML
INJECTION, SOLUTION INTRAVENOUS
Status: COMPLETED
Start: 2021-01-01 | End: 2021-01-01

## 2021-01-01 RX ORDER — AMOXICILLIN 250 MG
1 CAPSULE ORAL 2 TIMES DAILY
Status: DISCONTINUED | OUTPATIENT
Start: 2021-01-01 | End: 2021-01-01 | Stop reason: HOSPADM

## 2021-01-01 RX ORDER — NALOXONE HYDROCHLORIDE 0.4 MG/ML
0.2 INJECTION, SOLUTION INTRAMUSCULAR; INTRAVENOUS; SUBCUTANEOUS
Status: DISCONTINUED | OUTPATIENT
Start: 2021-01-01 | End: 2021-01-01 | Stop reason: HOSPADM

## 2021-01-01 RX ORDER — ALBUTEROL SULFATE 0.83 MG/ML
2.5 SOLUTION RESPIRATORY (INHALATION) EVERY 4 HOURS
Status: DISCONTINUED | OUTPATIENT
Start: 2021-01-01 | End: 2021-01-01 | Stop reason: HOSPADM

## 2021-01-01 RX ORDER — PIPERACILLIN SODIUM, TAZOBACTAM SODIUM 4; .5 G/20ML; G/20ML
4.5 INJECTION, POWDER, LYOPHILIZED, FOR SOLUTION INTRAVENOUS EVERY 6 HOURS
Status: DISCONTINUED | OUTPATIENT
Start: 2021-01-01 | End: 2021-01-01

## 2021-01-01 RX ORDER — HYDROMORPHONE HYDROCHLORIDE 1 MG/ML
0.3 INJECTION, SOLUTION INTRAMUSCULAR; INTRAVENOUS; SUBCUTANEOUS EVERY 30 MIN PRN
Status: DISCONTINUED | OUTPATIENT
Start: 2021-01-01 | End: 2021-01-01 | Stop reason: HOSPADM

## 2021-01-01 RX ORDER — GLYCOPYRROLATE 0.2 MG/ML
0.1 INJECTION, SOLUTION INTRAMUSCULAR; INTRAVENOUS EVERY 4 HOURS PRN
Status: DISCONTINUED | OUTPATIENT
Start: 2021-01-01 | End: 2021-01-01 | Stop reason: HOSPADM

## 2021-01-01 RX ORDER — NOREPINEPHRINE BITARTRATE 0.06 MG/ML
.01-.6 INJECTION, SOLUTION INTRAVENOUS CONTINUOUS
Status: DISCONTINUED | OUTPATIENT
Start: 2021-01-01 | End: 2021-01-01 | Stop reason: CLARIF

## 2021-01-01 RX ORDER — LABETALOL HYDROCHLORIDE 5 MG/ML
20 INJECTION, SOLUTION INTRAVENOUS EVERY 6 HOURS PRN
Status: DISCONTINUED | OUTPATIENT
Start: 2021-01-01 | End: 2021-01-01 | Stop reason: HOSPADM

## 2021-01-01 RX ORDER — MIDAZOLAM HCL IN 0.9 % NACL/PF 1 MG/ML
1-15 PLASTIC BAG, INJECTION (ML) INTRAVENOUS CONTINUOUS
Status: DISPENSED | OUTPATIENT
Start: 2021-01-01 | End: 2021-01-01

## 2021-01-01 RX ORDER — ONDANSETRON 2 MG/ML
4 INJECTION INTRAMUSCULAR; INTRAVENOUS EVERY 6 HOURS PRN
Status: DISCONTINUED | OUTPATIENT
Start: 2021-01-01 | End: 2021-01-01 | Stop reason: HOSPADM

## 2021-01-01 RX ORDER — ACETAMINOPHEN 325 MG/1
975 TABLET ORAL EVERY 6 HOURS PRN
Status: DISCONTINUED | OUTPATIENT
Start: 2021-01-01 | End: 2021-01-01 | Stop reason: HOSPADM

## 2021-01-01 RX ORDER — AMINO AC/PROTEIN HYDR/WHEY PRO 10G-100/30
1 LIQUID (ML) ORAL
Status: DISCONTINUED | OUTPATIENT
Start: 2021-01-01 | End: 2021-01-01

## 2021-01-01 RX ORDER — LORAZEPAM 2 MG/1
4 TABLET ORAL EVERY 4 HOURS
Status: DISCONTINUED | OUTPATIENT
Start: 2021-01-01 | End: 2021-01-01 | Stop reason: HOSPADM

## 2021-01-01 RX ORDER — HYDROMORPHONE HYDROCHLORIDE 1 MG/ML
1 INJECTION, SOLUTION INTRAMUSCULAR; INTRAVENOUS; SUBCUTANEOUS ONCE
Status: DISCONTINUED | OUTPATIENT
Start: 2021-01-01 | End: 2021-01-01 | Stop reason: HOSPADM

## 2021-01-01 RX ADMIN — FUROSEMIDE 120 MG: 10 INJECTION, SOLUTION INTRAVENOUS at 12:11

## 2021-01-01 RX ADMIN — ALBUTEROL SULFATE 2.5 MG: 2.5 SOLUTION RESPIRATORY (INHALATION) at 00:27

## 2021-01-01 RX ADMIN — FUROSEMIDE 80 MG: 10 INJECTION, SOLUTION INTRAVENOUS at 10:28

## 2021-01-01 RX ADMIN — DEXAMETHASONE SODIUM PHOSPHATE 6 MG: 4 INJECTION, SOLUTION INTRA-ARTICULAR; INTRALESIONAL; INTRAMUSCULAR; INTRAVENOUS; SOFT TISSUE at 12:13

## 2021-01-01 RX ADMIN — Medication 1 G: at 00:16

## 2021-01-01 RX ADMIN — PROPOFOL 30 MCG/KG/MIN: 10 INJECTION, EMULSION INTRAVENOUS at 04:28

## 2021-01-01 RX ADMIN — VECURONIUM BROMIDE 1 MCG/KG/MIN: 1 INJECTION, POWDER, LYOPHILIZED, FOR SOLUTION INTRAVENOUS at 02:40

## 2021-01-01 RX ADMIN — HYDROMORPHONE HYDROCHLORIDE 4 MG: 4 TABLET ORAL at 15:28

## 2021-01-01 RX ADMIN — Medication 15 ML: at 07:59

## 2021-01-01 RX ADMIN — PROPOFOL 30 MCG/KG/MIN: 10 INJECTION, EMULSION INTRAVENOUS at 12:38

## 2021-01-01 RX ADMIN — VECURONIUM BROMIDE 0.6 MCG/KG/MIN: 1 INJECTION, POWDER, LYOPHILIZED, FOR SOLUTION INTRAVENOUS at 16:19

## 2021-01-01 RX ADMIN — HUMAN INSULIN 6 UNITS/HR: 100 INJECTION, SOLUTION SUBCUTANEOUS at 13:08

## 2021-01-01 RX ADMIN — HUMAN INSULIN 7 UNITS/HR: 100 INJECTION, SOLUTION SUBCUTANEOUS at 00:46

## 2021-01-01 RX ADMIN — Medication 1 G: at 08:00

## 2021-01-01 RX ADMIN — HUMAN INSULIN 17 UNITS/HR: 100 INJECTION, SOLUTION SUBCUTANEOUS at 08:34

## 2021-01-01 RX ADMIN — PROPOFOL 30 MCG/KG/MIN: 10 INJECTION, EMULSION INTRAVENOUS at 14:25

## 2021-01-01 RX ADMIN — ACETAMINOPHEN 975 MG: 325 TABLET, FILM COATED ORAL at 00:25

## 2021-01-01 RX ADMIN — ALBUTEROL SULFATE 2.5 MG: 2.5 SOLUTION RESPIRATORY (INHALATION) at 08:39

## 2021-01-01 RX ADMIN — HEPARIN SODIUM 2650 UNITS/HR: 10000 INJECTION, SOLUTION INTRAVENOUS at 12:11

## 2021-01-01 RX ADMIN — PROPOFOL 40 MCG/KG/MIN: 10 INJECTION, EMULSION INTRAVENOUS at 20:05

## 2021-01-01 RX ADMIN — Medication 20 NG/KG/MIN: at 08:49

## 2021-01-01 RX ADMIN — PIPERACILLIN SODIUM AND TAZOBACTAM SODIUM 4.5 G: 4; .5 INJECTION, POWDER, LYOPHILIZED, FOR SOLUTION INTRAVENOUS at 08:15

## 2021-01-01 RX ADMIN — ALBUTEROL SULFATE 2.5 MG: 2.5 SOLUTION RESPIRATORY (INHALATION) at 20:13

## 2021-01-01 RX ADMIN — PROPOFOL 50 MCG/KG/MIN: 10 INJECTION, EMULSION INTRAVENOUS at 04:12

## 2021-01-01 RX ADMIN — PROPOFOL 45 MCG/KG/MIN: 10 INJECTION, EMULSION INTRAVENOUS at 01:09

## 2021-01-01 RX ADMIN — MIDAZOLAM (PF) 1 MG/ML IN 0.9 % SODIUM CHLORIDE INTRAVENOUS SOLUTION 13 MG/HR: at 09:41

## 2021-01-01 RX ADMIN — Medication 1 PACKET: at 12:22

## 2021-01-01 RX ADMIN — PROPOFOL 60 MCG/KG/MIN: 10 INJECTION, EMULSION INTRAVENOUS at 06:11

## 2021-01-01 RX ADMIN — Medication 2 MG/HR: at 00:59

## 2021-01-01 RX ADMIN — PROPOFOL 40 MCG/KG/MIN: 10 INJECTION, EMULSION INTRAVENOUS at 05:16

## 2021-01-01 RX ADMIN — HYDROMORPHONE HYDROCHLORIDE 4 MG: 4 TABLET ORAL at 16:18

## 2021-01-01 RX ADMIN — DEXAMETHASONE SODIUM PHOSPHATE 6 MG: 4 INJECTION, SOLUTION INTRA-ARTICULAR; INTRALESIONAL; INTRAMUSCULAR; INTRAVENOUS; SOFT TISSUE at 12:34

## 2021-01-01 RX ADMIN — MIDAZOLAM 3 MG: 1 INJECTION INTRAMUSCULAR; INTRAVENOUS at 03:59

## 2021-01-01 RX ADMIN — Medication 2 PACKET: at 20:32

## 2021-01-01 RX ADMIN — PROPOFOL 40 MCG/KG/MIN: 10 INJECTION, EMULSION INTRAVENOUS at 16:30

## 2021-01-01 RX ADMIN — PROPOFOL 25 MCG/KG/MIN: 10 INJECTION, EMULSION INTRAVENOUS at 09:07

## 2021-01-01 RX ADMIN — LORAZEPAM 4 MG: 2 TABLET ORAL at 20:15

## 2021-01-01 RX ADMIN — MIDAZOLAM 2 MG: 1 INJECTION INTRAMUSCULAR; INTRAVENOUS at 10:26

## 2021-01-01 RX ADMIN — MIDAZOLAM (PF) 1 MG/ML IN 0.9 % SODIUM CHLORIDE INTRAVENOUS SOLUTION 13 MG/HR: at 17:22

## 2021-01-01 RX ADMIN — GABAPENTIN 800 MG: 250 SUSPENSION ORAL at 06:49

## 2021-01-01 RX ADMIN — Medication 50 MCG/HR: at 04:04

## 2021-01-01 RX ADMIN — PROPOFOL 30 MCG/KG/MIN: 10 INJECTION, EMULSION INTRAVENOUS at 04:11

## 2021-01-01 RX ADMIN — FUROSEMIDE 120 MG: 10 INJECTION, SOLUTION INTRAVENOUS at 20:34

## 2021-01-01 RX ADMIN — ALBUTEROL SULFATE 2.5 MG: 2.5 SOLUTION RESPIRATORY (INHALATION) at 07:55

## 2021-01-01 RX ADMIN — Medication 20 NG/KG/MIN: at 06:59

## 2021-01-01 RX ADMIN — PIPERACILLIN AND TAZOBACTAM 3.38 G: 3; .375 INJECTION, POWDER, LYOPHILIZED, FOR SOLUTION INTRAVENOUS at 19:55

## 2021-01-01 RX ADMIN — HYDROMORPHONE HYDROCHLORIDE 4 MG: 4 TABLET ORAL at 03:59

## 2021-01-01 RX ADMIN — INSULIN GLARGINE 60 UNITS: 100 INJECTION, SOLUTION SUBCUTANEOUS at 07:51

## 2021-01-01 RX ADMIN — Medication 1 G: at 08:30

## 2021-01-01 RX ADMIN — LORAZEPAM 4 MG: 2 TABLET ORAL at 08:10

## 2021-01-01 RX ADMIN — Medication 2 MG/HR: at 05:07

## 2021-01-01 RX ADMIN — VECURONIUM BROMIDE 1 MCG/KG/MIN: 1 INJECTION, POWDER, LYOPHILIZED, FOR SOLUTION INTRAVENOUS at 07:34

## 2021-01-01 RX ADMIN — PROPOFOL 45 MCG/KG/MIN: 10 INJECTION, EMULSION INTRAVENOUS at 05:43

## 2021-01-01 RX ADMIN — DOCUSATE SODIUM 50 MG AND SENNOSIDES 8.6 MG 2 TABLET: 8.6; 5 TABLET, FILM COATED ORAL at 20:04

## 2021-01-01 RX ADMIN — SODIUM CHLORIDE, POTASSIUM CHLORIDE, SODIUM LACTATE AND CALCIUM CHLORIDE 500 ML: 600; 310; 30; 20 INJECTION, SOLUTION INTRAVENOUS at 08:08

## 2021-01-01 RX ADMIN — Medication 40 MG: at 08:20

## 2021-01-01 RX ADMIN — NOREPINEPHRINE BITARTRATE 0.03 MCG/KG/MIN: 1 INJECTION, SOLUTION, CONCENTRATE INTRAVENOUS at 08:49

## 2021-01-01 RX ADMIN — Medication 40 MG: at 08:03

## 2021-01-01 RX ADMIN — LORAZEPAM 4 MG: 2 TABLET ORAL at 11:27

## 2021-01-01 RX ADMIN — Medication 50 MCG: at 04:08

## 2021-01-01 RX ADMIN — VECURONIUM BROMIDE 1.3 MCG/KG/MIN: 1 INJECTION, POWDER, LYOPHILIZED, FOR SOLUTION INTRAVENOUS at 02:41

## 2021-01-01 RX ADMIN — VECURONIUM BROMIDE 1 MCG/KG/MIN: 1 INJECTION, POWDER, LYOPHILIZED, FOR SOLUTION INTRAVENOUS at 15:01

## 2021-01-01 RX ADMIN — VECURONIUM BROMIDE 1 MCG/KG/MIN: 1 INJECTION, POWDER, LYOPHILIZED, FOR SOLUTION INTRAVENOUS at 13:12

## 2021-01-01 RX ADMIN — VECURONIUM BROMIDE 1 MCG/KG/MIN: 1 INJECTION, POWDER, LYOPHILIZED, FOR SOLUTION INTRAVENOUS at 18:59

## 2021-01-01 RX ADMIN — LORAZEPAM 4 MG: 2 TABLET ORAL at 04:03

## 2021-01-01 RX ADMIN — LORAZEPAM 4 MG: 2 TABLET ORAL at 12:08

## 2021-01-01 RX ADMIN — PROPOFOL 40 MCG/KG/MIN: 10 INJECTION, EMULSION INTRAVENOUS at 17:52

## 2021-01-01 RX ADMIN — ALBUTEROL SULFATE 2.5 MG: 2.5 SOLUTION RESPIRATORY (INHALATION) at 17:14

## 2021-01-01 RX ADMIN — Medication 1 G: at 00:06

## 2021-01-01 RX ADMIN — POTASSIUM CHLORIDE 20 MEQ: 29.8 INJECTION, SOLUTION INTRAVENOUS at 13:03

## 2021-01-01 RX ADMIN — DOCUSATE SODIUM 50 MG AND SENNOSIDES 8.6 MG 2 TABLET: 8.6; 5 TABLET, FILM COATED ORAL at 19:55

## 2021-01-01 RX ADMIN — DOCUSATE SODIUM 50 MG AND SENNOSIDES 8.6 MG 1 TABLET: 8.6; 5 TABLET, FILM COATED ORAL at 20:15

## 2021-01-01 RX ADMIN — DOCUSATE SODIUM 50 MG AND SENNOSIDES 8.6 MG 1 TABLET: 8.6; 5 TABLET, FILM COATED ORAL at 19:47

## 2021-01-01 RX ADMIN — LORAZEPAM 4 MG: 2 TABLET ORAL at 19:47

## 2021-01-01 RX ADMIN — INSULIN ASPART 7 UNITS: 100 INJECTION, SOLUTION INTRAVENOUS; SUBCUTANEOUS at 23:40

## 2021-01-01 RX ADMIN — HYDROMORPHONE HYDROCHLORIDE 4 MG: 4 TABLET ORAL at 20:29

## 2021-01-01 RX ADMIN — FUROSEMIDE 120 MG: 10 INJECTION, SOLUTION INTRAVENOUS at 10:59

## 2021-01-01 RX ADMIN — ALBUTEROL SULFATE 2.5 MG: 2.5 SOLUTION RESPIRATORY (INHALATION) at 04:29

## 2021-01-01 RX ADMIN — VECURONIUM BROMIDE 1 MCG/KG/MIN: 1 INJECTION, POWDER, LYOPHILIZED, FOR SOLUTION INTRAVENOUS at 18:35

## 2021-01-01 RX ADMIN — Medication 1 G: at 00:04

## 2021-01-01 RX ADMIN — ALBUTEROL SULFATE 2.5 MG: 2.5 SOLUTION RESPIRATORY (INHALATION) at 20:55

## 2021-01-01 RX ADMIN — VANCOMYCIN HYDROCHLORIDE 1000 MG: 1 INJECTION, SOLUTION INTRAVENOUS at 05:26

## 2021-01-01 RX ADMIN — LORAZEPAM 4 MG: 2 TABLET ORAL at 20:04

## 2021-01-01 RX ADMIN — PROPOFOL 50 MCG/KG/MIN: 10 INJECTION, EMULSION INTRAVENOUS at 21:51

## 2021-01-01 RX ADMIN — INSULIN ASPART 3 UNITS: 100 INJECTION, SOLUTION INTRAVENOUS; SUBCUTANEOUS at 19:41

## 2021-01-01 RX ADMIN — VECURONIUM BROMIDE 1.2 MCG/KG/MIN: 1 INJECTION, POWDER, LYOPHILIZED, FOR SOLUTION INTRAVENOUS at 08:08

## 2021-01-01 RX ADMIN — Medication 2 PACKET: at 08:04

## 2021-01-01 RX ADMIN — Medication 1 G: at 15:32

## 2021-01-01 RX ADMIN — VECURONIUM BROMIDE 0.5 MCG/KG/MIN: 1 INJECTION, POWDER, LYOPHILIZED, FOR SOLUTION INTRAVENOUS at 13:11

## 2021-01-01 RX ADMIN — HUMAN INSULIN 14 UNITS/HR: 100 INJECTION, SOLUTION SUBCUTANEOUS at 16:06

## 2021-01-01 RX ADMIN — Medication 1 G: at 16:24

## 2021-01-01 RX ADMIN — PROPOFOL 30 MCG/KG/MIN: 10 INJECTION, EMULSION INTRAVENOUS at 18:35

## 2021-01-01 RX ADMIN — MIDAZOLAM 2 MG: 1 INJECTION INTRAMUSCULAR; INTRAVENOUS at 09:08

## 2021-01-01 RX ADMIN — Medication 2 MG/HR: at 06:02

## 2021-01-01 RX ADMIN — PROPOFOL 45 MCG/KG/MIN: 10 INJECTION, EMULSION INTRAVENOUS at 14:08

## 2021-01-01 RX ADMIN — Medication 2 MG/HR: at 10:22

## 2021-01-01 RX ADMIN — ACETAMINOPHEN 975 MG: 325 TABLET, FILM COATED ORAL at 18:22

## 2021-01-01 RX ADMIN — PROPOFOL 30 MCG/KG/MIN: 10 INJECTION, EMULSION INTRAVENOUS at 17:53

## 2021-01-01 RX ADMIN — POLYETHYLENE GLYCOL 3350 17 G: 17 POWDER, FOR SOLUTION ORAL at 07:49

## 2021-01-01 RX ADMIN — DOCUSATE SODIUM 50 MG AND SENNOSIDES 8.6 MG 2 TABLET: 8.6; 5 TABLET, FILM COATED ORAL at 20:34

## 2021-01-01 RX ADMIN — PROPOFOL 45 MCG/KG/MIN: 10 INJECTION, EMULSION INTRAVENOUS at 10:09

## 2021-01-01 RX ADMIN — Medication 2 PACKET: at 19:58

## 2021-01-01 RX ADMIN — VANCOMYCIN HYDROCHLORIDE 2500 MG: 100 INJECTION, POWDER, LYOPHILIZED, FOR SOLUTION INTRAVENOUS at 09:30

## 2021-01-01 RX ADMIN — Medication 1 G: at 16:06

## 2021-01-01 RX ADMIN — Medication 2 PACKET: at 19:32

## 2021-01-01 RX ADMIN — POTASSIUM CHLORIDE 40 MEQ: 40 SOLUTION ORAL at 08:03

## 2021-01-01 RX ADMIN — PROPOFOL 45 MCG/KG/MIN: 10 INJECTION, EMULSION INTRAVENOUS at 05:24

## 2021-01-01 RX ADMIN — ALBUTEROL SULFATE 2.5 MG: 2.5 SOLUTION RESPIRATORY (INHALATION) at 00:18

## 2021-01-01 RX ADMIN — ALBUTEROL SULFATE 2.5 MG: 2.5 SOLUTION RESPIRATORY (INHALATION) at 03:57

## 2021-01-01 RX ADMIN — ALBUTEROL SULFATE 2.5 MG: 2.5 SOLUTION RESPIRATORY (INHALATION) at 23:22

## 2021-01-01 RX ADMIN — LORAZEPAM 4 MG: 2 TABLET ORAL at 03:59

## 2021-01-01 RX ADMIN — PROPOFOL 30 MCG/KG/MIN: 10 INJECTION, EMULSION INTRAVENOUS at 10:40

## 2021-01-01 RX ADMIN — HYDROMORPHONE HYDROCHLORIDE 4 MG: 4 TABLET ORAL at 15:58

## 2021-01-01 RX ADMIN — HYDROMORPHONE HYDROCHLORIDE 4 MG: 4 TABLET ORAL at 00:16

## 2021-01-01 RX ADMIN — DEXAMETHASONE SODIUM PHOSPHATE 6 MG: 4 INJECTION, SOLUTION INTRA-ARTICULAR; INTRALESIONAL; INTRAMUSCULAR; INTRAVENOUS; SOFT TISSUE at 13:31

## 2021-01-01 RX ADMIN — GABAPENTIN 800 MG: 250 SUSPENSION ORAL at 21:03

## 2021-01-01 RX ADMIN — HYDROMORPHONE HYDROCHLORIDE 4 MG: 4 TABLET ORAL at 04:03

## 2021-01-01 RX ADMIN — ALBUTEROL SULFATE 2.5 MG: 2.5 SOLUTION RESPIRATORY (INHALATION) at 20:03

## 2021-01-01 RX ADMIN — HEPARIN SODIUM 1200 UNITS/HR: 10000 INJECTION, SOLUTION INTRAVENOUS at 14:09

## 2021-01-01 RX ADMIN — PROPOFOL 30 MCG/KG/MIN: 10 INJECTION, EMULSION INTRAVENOUS at 00:06

## 2021-01-01 RX ADMIN — Medication 20 NG/KG/MIN: at 00:49

## 2021-01-01 RX ADMIN — ALBUTEROL SULFATE 2.5 MG: 2.5 SOLUTION RESPIRATORY (INHALATION) at 12:08

## 2021-01-01 RX ADMIN — HEPARIN SODIUM 2650 UNITS/HR: 10000 INJECTION, SOLUTION INTRAVENOUS at 16:54

## 2021-01-01 RX ADMIN — Medication 1 PACKET: at 19:43

## 2021-01-01 RX ADMIN — ALBUTEROL SULFATE 2.5 MG: 2.5 SOLUTION RESPIRATORY (INHALATION) at 11:06

## 2021-01-01 RX ADMIN — HYDROMORPHONE HYDROCHLORIDE 4 MG: 4 TABLET ORAL at 11:27

## 2021-01-01 RX ADMIN — VECURONIUM BROMIDE 1 MCG/KG/MIN: 1 INJECTION, POWDER, LYOPHILIZED, FOR SOLUTION INTRAVENOUS at 20:55

## 2021-01-01 RX ADMIN — Medication 2 PACKET: at 20:14

## 2021-01-01 RX ADMIN — DOCUSATE SODIUM 50 MG AND SENNOSIDES 8.6 MG 2 TABLET: 8.6; 5 TABLET, FILM COATED ORAL at 08:29

## 2021-01-01 RX ADMIN — HYDROMORPHONE HYDROCHLORIDE 4 MG: 4 TABLET ORAL at 08:10

## 2021-01-01 RX ADMIN — Medication 2 PACKET: at 08:10

## 2021-01-01 RX ADMIN — Medication 10 MG: at 13:36

## 2021-01-01 RX ADMIN — GABAPENTIN 800 MG: 250 SUSPENSION ORAL at 13:58

## 2021-01-01 RX ADMIN — FUROSEMIDE 120 MG: 10 INJECTION, SOLUTION INTRAVENOUS at 00:52

## 2021-01-01 RX ADMIN — ALBUTEROL SULFATE 2.5 MG: 2.5 SOLUTION RESPIRATORY (INHALATION) at 09:29

## 2021-01-01 RX ADMIN — PROPOFOL 30 MCG/KG/MIN: 10 INJECTION, EMULSION INTRAVENOUS at 02:40

## 2021-01-01 RX ADMIN — Medication 1 G: at 17:09

## 2021-01-01 RX ADMIN — HUMAN INSULIN 20 UNITS/HR: 100 INJECTION, SOLUTION SUBCUTANEOUS at 20:47

## 2021-01-01 RX ADMIN — INSULIN ASPART 1 UNITS: 100 INJECTION, SOLUTION INTRAVENOUS; SUBCUTANEOUS at 16:41

## 2021-01-01 RX ADMIN — Medication 2 PACKET: at 13:20

## 2021-01-01 RX ADMIN — HEPARIN SODIUM 2500 UNITS/HR: 10000 INJECTION, SOLUTION INTRAVENOUS at 23:10

## 2021-01-01 RX ADMIN — FUROSEMIDE 120 MG: 10 INJECTION, SOLUTION INTRAVENOUS at 16:58

## 2021-01-01 RX ADMIN — FUROSEMIDE 120 MG: 10 INJECTION, SOLUTION INTRAVENOUS at 08:24

## 2021-01-01 RX ADMIN — VECURONIUM BROMIDE 0.6 MCG/KG/MIN: 1 INJECTION, POWDER, LYOPHILIZED, FOR SOLUTION INTRAVENOUS at 18:38

## 2021-01-01 RX ADMIN — SODIUM BICARBONATE: 84 INJECTION, SOLUTION INTRAVENOUS at 04:33

## 2021-01-01 RX ADMIN — DEXAMETHASONE SODIUM PHOSPHATE 6 MG: 4 INJECTION, SOLUTION INTRA-ARTICULAR; INTRALESIONAL; INTRAMUSCULAR; INTRAVENOUS; SOFT TISSUE at 12:22

## 2021-01-01 RX ADMIN — ALBUTEROL SULFATE 2.5 MG: 2.5 SOLUTION RESPIRATORY (INHALATION) at 16:01

## 2021-01-01 RX ADMIN — HEPARIN SODIUM 2650 UNITS/HR: 10000 INJECTION, SOLUTION INTRAVENOUS at 07:54

## 2021-01-01 RX ADMIN — Medication 2 PACKET: at 08:00

## 2021-01-01 RX ADMIN — HUMAN INSULIN 18 UNITS/HR: 100 INJECTION, SOLUTION SUBCUTANEOUS at 14:48

## 2021-01-01 RX ADMIN — ALBUTEROL SULFATE 2.5 MG: 2.5 SOLUTION RESPIRATORY (INHALATION) at 05:44

## 2021-01-01 RX ADMIN — Medication 2 MG/HR: at 20:34

## 2021-01-01 RX ADMIN — PROPOFOL 50 MCG/KG/MIN: 10 INJECTION, EMULSION INTRAVENOUS at 18:54

## 2021-01-01 RX ADMIN — Medication 1 G: at 07:39

## 2021-01-01 RX ADMIN — DEXTROSE MONOHYDRATE 50 ML: 500 INJECTION PARENTERAL at 07:49

## 2021-01-01 RX ADMIN — LORAZEPAM 4 MG: 2 TABLET ORAL at 15:58

## 2021-01-01 RX ADMIN — FUROSEMIDE 120 MG: 10 INJECTION, SOLUTION INTRAVENOUS at 12:50

## 2021-01-01 RX ADMIN — Medication 15 ML: at 08:03

## 2021-01-01 RX ADMIN — ALBUTEROL SULFATE 2.5 MG: 2.5 SOLUTION RESPIRATORY (INHALATION) at 15:51

## 2021-01-01 RX ADMIN — ALBUTEROL SULFATE 2.5 MG: 2.5 SOLUTION RESPIRATORY (INHALATION) at 00:20

## 2021-01-01 RX ADMIN — Medication: at 12:22

## 2021-01-01 RX ADMIN — PROPOFOL 30 MCG/KG/MIN: 10 INJECTION, EMULSION INTRAVENOUS at 00:53

## 2021-01-01 RX ADMIN — Medication 20 NG/KG/MIN: at 15:39

## 2021-01-01 RX ADMIN — HYDROMORPHONE HYDROCHLORIDE 4 MG: 4 TABLET ORAL at 07:49

## 2021-01-01 RX ADMIN — VECURONIUM BROMIDE 1 MCG/KG/MIN: 1 INJECTION, POWDER, LYOPHILIZED, FOR SOLUTION INTRAVENOUS at 20:02

## 2021-01-01 RX ADMIN — INSULIN ASPART 5 UNITS: 100 INJECTION, SOLUTION INTRAVENOUS; SUBCUTANEOUS at 03:32

## 2021-01-01 RX ADMIN — HYDROMORPHONE HYDROCHLORIDE 4 MG: 4 TABLET ORAL at 03:45

## 2021-01-01 RX ADMIN — DOCUSATE SODIUM 50 MG AND SENNOSIDES 8.6 MG 1 TABLET: 8.6; 5 TABLET, FILM COATED ORAL at 07:57

## 2021-01-01 RX ADMIN — HUMAN INSULIN 4 UNITS/HR: 100 INJECTION, SOLUTION SUBCUTANEOUS at 04:29

## 2021-01-01 RX ADMIN — ALBUTEROL SULFATE 2.5 MG: 2.5 SOLUTION RESPIRATORY (INHALATION) at 19:57

## 2021-01-01 RX ADMIN — Medication 1 G: at 00:20

## 2021-01-01 RX ADMIN — SODIUM CHLORIDE, POTASSIUM CHLORIDE, SODIUM LACTATE AND CALCIUM CHLORIDE 1000 ML: 600; 310; 30; 20 INJECTION, SOLUTION INTRAVENOUS at 16:41

## 2021-01-01 RX ADMIN — ENOXAPARIN SODIUM 40 MG: 40 INJECTION SUBCUTANEOUS at 19:48

## 2021-01-01 RX ADMIN — Medication 50 MCG: at 05:48

## 2021-01-01 RX ADMIN — HEPARIN SODIUM 2200 UNITS/HR: 10000 INJECTION, SOLUTION INTRAVENOUS at 13:25

## 2021-01-01 RX ADMIN — INSULIN GLARGINE 60 UNITS: 100 INJECTION, SOLUTION SUBCUTANEOUS at 12:12

## 2021-01-01 RX ADMIN — HUMAN INSULIN 19 UNITS/HR: 100 INJECTION, SOLUTION SUBCUTANEOUS at 17:53

## 2021-01-01 RX ADMIN — HYDROMORPHONE HYDROCHLORIDE 4 MG: 4 TABLET ORAL at 20:27

## 2021-01-01 RX ADMIN — LORAZEPAM 4 MG: 2 TABLET ORAL at 07:59

## 2021-01-01 RX ADMIN — INSULIN GLARGINE 40 UNITS: 100 INJECTION, SOLUTION SUBCUTANEOUS at 07:39

## 2021-01-01 RX ADMIN — Medication 40 MG: at 08:07

## 2021-01-01 RX ADMIN — HUMAN INSULIN 7 UNITS/HR: 100 INJECTION, SOLUTION SUBCUTANEOUS at 15:02

## 2021-01-01 RX ADMIN — HUMAN INSULIN 21 UNITS/HR: 100 INJECTION, SOLUTION SUBCUTANEOUS at 23:09

## 2021-01-01 RX ADMIN — Medication 2 PACKET: at 13:37

## 2021-01-01 RX ADMIN — FUROSEMIDE 120 MG: 10 INJECTION, SOLUTION INTRAVENOUS at 01:47

## 2021-01-01 RX ADMIN — PROPOFOL 45 MCG/KG/MIN: 10 INJECTION, EMULSION INTRAVENOUS at 02:42

## 2021-01-01 RX ADMIN — Medication 50 MCG: at 09:25

## 2021-01-01 RX ADMIN — HUMAN INSULIN 9 UNITS/HR: 100 INJECTION, SOLUTION SUBCUTANEOUS at 20:29

## 2021-01-01 RX ADMIN — VECURONIUM BROMIDE 1 MCG/KG/MIN: 1 INJECTION, POWDER, LYOPHILIZED, FOR SOLUTION INTRAVENOUS at 13:58

## 2021-01-01 RX ADMIN — FUROSEMIDE 40 MG: 10 INJECTION, SOLUTION INTRAVENOUS at 23:00

## 2021-01-01 RX ADMIN — Medication 1 G: at 08:08

## 2021-01-01 RX ADMIN — PROPOFOL 40 MCG/KG/MIN: 10 INJECTION, EMULSION INTRAVENOUS at 10:13

## 2021-01-01 RX ADMIN — Medication 2 MG/HR: at 14:52

## 2021-01-01 RX ADMIN — LORAZEPAM 4 MG: 2 TABLET ORAL at 15:28

## 2021-01-01 RX ADMIN — LORAZEPAM 4 MG: 2 TABLET ORAL at 05:08

## 2021-01-01 RX ADMIN — PROPOFOL 30 MCG/KG/MIN: 10 INJECTION, EMULSION INTRAVENOUS at 00:59

## 2021-01-01 RX ADMIN — DEXAMETHASONE SODIUM PHOSPHATE 6 MG: 4 INJECTION, SOLUTION INTRA-ARTICULAR; INTRALESIONAL; INTRAMUSCULAR; INTRAVENOUS; SOFT TISSUE at 12:54

## 2021-01-01 RX ADMIN — HUMAN INSULIN 5 UNITS/HR: 100 INJECTION, SOLUTION SUBCUTANEOUS at 00:34

## 2021-01-01 RX ADMIN — CEFTRIAXONE SODIUM 2 G: 2 INJECTION, POWDER, FOR SOLUTION INTRAMUSCULAR; INTRAVENOUS at 13:03

## 2021-01-01 RX ADMIN — PROPOFOL 30 MCG/KG/MIN: 10 INJECTION, EMULSION INTRAVENOUS at 04:33

## 2021-01-01 RX ADMIN — HYDROMORPHONE HYDROCHLORIDE 4 MG: 4 TABLET ORAL at 05:08

## 2021-01-01 RX ADMIN — CALCIUM GLUCONATE 1 G: 20 INJECTION, SOLUTION INTRAVENOUS at 17:36

## 2021-01-01 RX ADMIN — VECURONIUM BROMIDE 1 MCG/KG/MIN: 1 INJECTION, POWDER, LYOPHILIZED, FOR SOLUTION INTRAVENOUS at 10:41

## 2021-01-01 RX ADMIN — ALBUTEROL SULFATE 2.5 MG: 2.5 SOLUTION RESPIRATORY (INHALATION) at 12:17

## 2021-01-01 RX ADMIN — POLYETHYLENE GLYCOL 3350 17 G: 17 POWDER, FOR SOLUTION ORAL at 08:30

## 2021-01-01 RX ADMIN — FUROSEMIDE 120 MG: 10 INJECTION, SOLUTION INTRAVENOUS at 17:24

## 2021-01-01 RX ADMIN — Medication 2 MG/HR: at 10:57

## 2021-01-01 RX ADMIN — INSULIN GLARGINE 30 UNITS: 100 INJECTION, SOLUTION SUBCUTANEOUS at 12:18

## 2021-01-01 RX ADMIN — HYDROMORPHONE HYDROCHLORIDE 4 MG: 4 TABLET ORAL at 00:47

## 2021-01-01 RX ADMIN — ALBUTEROL SULFATE 2.5 MG: 2.5 SOLUTION RESPIRATORY (INHALATION) at 03:00

## 2021-01-01 RX ADMIN — LORAZEPAM 4 MG: 2 TABLET ORAL at 00:06

## 2021-01-01 RX ADMIN — PROPOFOL 40 MCG/KG/MIN: 10 INJECTION, EMULSION INTRAVENOUS at 05:21

## 2021-01-01 RX ADMIN — FUROSEMIDE 100 MG: 10 INJECTION, SOLUTION INTRAVENOUS at 16:36

## 2021-01-01 RX ADMIN — Medication 2 PACKET: at 13:06

## 2021-01-01 RX ADMIN — HUMAN INSULIN 19 UNITS/HR: 100 INJECTION, SOLUTION SUBCUTANEOUS at 20:36

## 2021-01-01 RX ADMIN — HYDROMORPHONE HYDROCHLORIDE 4 MG: 4 TABLET ORAL at 20:15

## 2021-01-01 RX ADMIN — PROPOFOL 60 MCG/KG/MIN: 10 INJECTION, EMULSION INTRAVENOUS at 10:26

## 2021-01-01 RX ADMIN — Medication 40 MG: at 10:57

## 2021-01-01 RX ADMIN — DEXAMETHASONE SODIUM PHOSPHATE 6 MG: 4 INJECTION, SOLUTION INTRA-ARTICULAR; INTRALESIONAL; INTRAMUSCULAR; INTRAVENOUS; SOFT TISSUE at 13:06

## 2021-01-01 RX ADMIN — Medication 2 MG/HR: at 22:54

## 2021-01-01 RX ADMIN — LORAZEPAM 4 MG: 2 TABLET ORAL at 20:29

## 2021-01-01 RX ADMIN — MIDAZOLAM (PF) 1 MG/ML IN 0.9 % SODIUM CHLORIDE INTRAVENOUS SOLUTION 13 MG/HR: at 11:53

## 2021-01-01 RX ADMIN — VECURONIUM BROMIDE 1 MCG/KG/MIN: 1 INJECTION, POWDER, LYOPHILIZED, FOR SOLUTION INTRAVENOUS at 11:19

## 2021-01-01 RX ADMIN — LORAZEPAM 4 MG: 2 TABLET ORAL at 15:50

## 2021-01-01 RX ADMIN — ENOXAPARIN SODIUM 60 MG: 60 INJECTION SUBCUTANEOUS at 19:32

## 2021-01-01 RX ADMIN — Medication 1 PACKET: at 19:42

## 2021-01-01 RX ADMIN — MIDAZOLAM (PF) 1 MG/ML IN 0.9 % SODIUM CHLORIDE INTRAVENOUS SOLUTION 13 MG/HR: at 15:56

## 2021-01-01 RX ADMIN — DOCUSATE SODIUM 50 MG AND SENNOSIDES 8.6 MG 2 TABLET: 8.6; 5 TABLET, FILM COATED ORAL at 08:54

## 2021-01-01 RX ADMIN — HYDROMORPHONE HYDROCHLORIDE 4 MG: 4 TABLET ORAL at 12:00

## 2021-01-01 RX ADMIN — HEPARIN SODIUM 1450 UNITS/HR: 10000 INJECTION, SOLUTION INTRAVENOUS at 00:03

## 2021-01-01 RX ADMIN — Medication 40 MG: at 07:59

## 2021-01-01 RX ADMIN — Medication 2 PACKET: at 07:50

## 2021-01-01 RX ADMIN — CEFTRIAXONE SODIUM 2 G: 2 INJECTION, POWDER, FOR SOLUTION INTRAMUSCULAR; INTRAVENOUS at 13:26

## 2021-01-01 RX ADMIN — PROPOFOL 45 MCG/KG/MIN: 10 INJECTION, EMULSION INTRAVENOUS at 11:09

## 2021-01-01 RX ADMIN — HUMAN INSULIN 23 UNITS/HR: 100 INJECTION, SOLUTION SUBCUTANEOUS at 01:28

## 2021-01-01 RX ADMIN — SODIUM CHLORIDE, POTASSIUM CHLORIDE, SODIUM LACTATE AND CALCIUM CHLORIDE 500 ML: 600; 310; 30; 20 INJECTION, SOLUTION INTRAVENOUS at 08:35

## 2021-01-01 RX ADMIN — MIDAZOLAM (PF) 1 MG/ML IN 0.9 % SODIUM CHLORIDE INTRAVENOUS SOLUTION 13 MG/HR: at 00:39

## 2021-01-01 RX ADMIN — ALBUTEROL SULFATE 2.5 MG: 2.5 SOLUTION RESPIRATORY (INHALATION) at 20:39

## 2021-01-01 RX ADMIN — HUMAN INSULIN 3 UNITS/HR: 100 INJECTION, SOLUTION SUBCUTANEOUS at 04:03

## 2021-01-01 RX ADMIN — INSULIN GLARGINE 80 UNITS: 100 INJECTION, SOLUTION SUBCUTANEOUS at 08:30

## 2021-01-01 RX ADMIN — ACETAMINOPHEN 975 MG: 325 TABLET, FILM COATED ORAL at 12:22

## 2021-01-01 RX ADMIN — Medication 2 PACKET: at 08:08

## 2021-01-01 RX ADMIN — ALBUTEROL SULFATE 2.5 MG: 2.5 SOLUTION RESPIRATORY (INHALATION) at 16:05

## 2021-01-01 RX ADMIN — Medication 15 ML: at 07:37

## 2021-01-01 RX ADMIN — VECURONIUM BROMIDE 1 MCG/KG/MIN: 1 INJECTION, POWDER, LYOPHILIZED, FOR SOLUTION INTRAVENOUS at 22:24

## 2021-01-01 RX ADMIN — Medication 2 PACKET: at 08:30

## 2021-01-01 RX ADMIN — ALBUTEROL SULFATE 2.5 MG: 2.5 SOLUTION RESPIRATORY (INHALATION) at 11:35

## 2021-01-01 RX ADMIN — Medication 1 G: at 08:18

## 2021-01-01 RX ADMIN — PROPOFOL 30 MCG/KG/MIN: 10 INJECTION, EMULSION INTRAVENOUS at 14:59

## 2021-01-01 RX ADMIN — Medication 1 G: at 00:19

## 2021-01-01 RX ADMIN — HUMAN INSULIN 23 UNITS/HR: 100 INJECTION, SOLUTION SUBCUTANEOUS at 03:42

## 2021-01-01 RX ADMIN — VECURONIUM BROMIDE 1 MCG/KG/MIN: 1 INJECTION, POWDER, LYOPHILIZED, FOR SOLUTION INTRAVENOUS at 18:58

## 2021-01-01 RX ADMIN — VECURONIUM BROMIDE 0.7 MCG/KG/MIN: 1 INJECTION, POWDER, LYOPHILIZED, FOR SOLUTION INTRAVENOUS at 07:49

## 2021-01-01 RX ADMIN — ALBUTEROL SULFATE 2.5 MG: 2.5 SOLUTION RESPIRATORY (INHALATION) at 04:08

## 2021-01-01 RX ADMIN — LORAZEPAM 4 MG: 2 TABLET ORAL at 07:49

## 2021-01-01 RX ADMIN — LORAZEPAM 4 MG: 2 TABLET ORAL at 08:30

## 2021-01-01 RX ADMIN — Medication 2 PACKET: at 13:26

## 2021-01-01 RX ADMIN — DEXAMETHASONE SODIUM PHOSPHATE 6 MG: 4 INJECTION, SOLUTION INTRA-ARTICULAR; INTRALESIONAL; INTRAMUSCULAR; INTRAVENOUS; SOFT TISSUE at 13:21

## 2021-01-01 RX ADMIN — PANTOPRAZOLE SODIUM 40 MG: 40 INJECTION, POWDER, FOR SOLUTION INTRAVENOUS at 08:08

## 2021-01-01 RX ADMIN — ENOXAPARIN SODIUM 60 MG: 60 INJECTION SUBCUTANEOUS at 19:43

## 2021-01-01 RX ADMIN — PROPOFOL 50 MCG/KG/MIN: 10 INJECTION, EMULSION INTRAVENOUS at 13:42

## 2021-01-01 RX ADMIN — Medication 2 MG/HR: at 19:55

## 2021-01-01 RX ADMIN — Medication 15 MG/HR: at 11:44

## 2021-01-01 RX ADMIN — PROPOFOL 40 MCG/KG/MIN: 10 INJECTION, EMULSION INTRAVENOUS at 00:16

## 2021-01-01 RX ADMIN — LORAZEPAM 4 MG: 2 TABLET ORAL at 16:22

## 2021-01-01 RX ADMIN — PROPOFOL 40 MCG/KG/MIN: 10 INJECTION, EMULSION INTRAVENOUS at 13:30

## 2021-01-01 RX ADMIN — POLYETHYLENE GLYCOL 3350 17 G: 17 POWDER, FOR SOLUTION ORAL at 08:08

## 2021-01-01 RX ADMIN — FUROSEMIDE 120 MG: 10 INJECTION, SOLUTION INTRAVENOUS at 16:28

## 2021-01-01 RX ADMIN — ALBUTEROL SULFATE 2.5 MG: 2.5 SOLUTION RESPIRATORY (INHALATION) at 08:53

## 2021-01-01 RX ADMIN — SODIUM BICARBONATE: 84 INJECTION, SOLUTION INTRAVENOUS at 12:23

## 2021-01-01 RX ADMIN — LORAZEPAM 4 MG: 2 TABLET ORAL at 08:03

## 2021-01-01 RX ADMIN — ALBUTEROL SULFATE 2.5 MG: 2.5 SOLUTION RESPIRATORY (INHALATION) at 12:12

## 2021-01-01 RX ADMIN — ALBUTEROL SULFATE 2.5 MG: 2.5 SOLUTION RESPIRATORY (INHALATION) at 04:35

## 2021-01-01 RX ADMIN — ACETAMINOPHEN 975 MG: 325 TABLET, FILM COATED ORAL at 16:54

## 2021-01-01 RX ADMIN — FUROSEMIDE 120 MG: 10 INJECTION, SOLUTION INTRAVENOUS at 22:14

## 2021-01-01 RX ADMIN — POLYETHYLENE GLYCOL 3350 17 G: 17 POWDER, FOR SOLUTION ORAL at 08:03

## 2021-01-01 RX ADMIN — Medication 15 ML: at 07:49

## 2021-01-01 RX ADMIN — Medication 0.05 MCG/KG/MIN: at 10:55

## 2021-01-01 RX ADMIN — HEPARIN SODIUM 2050 UNITS/HR: 10000 INJECTION, SOLUTION INTRAVENOUS at 02:24

## 2021-01-01 RX ADMIN — HUMAN INSULIN 13 UNITS/HR: 100 INJECTION, SOLUTION SUBCUTANEOUS at 18:57

## 2021-01-01 RX ADMIN — HUMAN ALBUMIN MICROSPHERES AND PERFLUTREN 9 ML: 10; .22 INJECTION, SOLUTION INTRAVENOUS at 14:48

## 2021-01-01 RX ADMIN — PROPOFOL 45 MCG/KG/MIN: 10 INJECTION, EMULSION INTRAVENOUS at 23:00

## 2021-01-01 RX ADMIN — Medication 1 G: at 15:58

## 2021-01-01 RX ADMIN — Medication 2 PACKET: at 19:47

## 2021-01-01 RX ADMIN — Medication 1 G: at 16:39

## 2021-01-01 RX ADMIN — PROPOFOL 30 MCG/KG/MIN: 10 INJECTION, EMULSION INTRAVENOUS at 14:22

## 2021-01-01 RX ADMIN — VANCOMYCIN HYDROCHLORIDE 1000 MG: 1 INJECTION, SOLUTION INTRAVENOUS at 08:56

## 2021-01-01 RX ADMIN — Medication 2 MG/HR: at 20:03

## 2021-01-01 RX ADMIN — ALBUTEROL SULFATE 2.5 MG: 2.5 SOLUTION RESPIRATORY (INHALATION) at 04:02

## 2021-01-01 RX ADMIN — PROPOFOL 30 MCG/KG/MIN: 10 INJECTION, EMULSION INTRAVENOUS at 06:36

## 2021-01-01 RX ADMIN — MIDAZOLAM (PF) 1 MG/ML IN 0.9 % SODIUM CHLORIDE INTRAVENOUS SOLUTION 13 MG/HR: at 02:54

## 2021-01-01 RX ADMIN — Medication 2 MG/HR: at 21:06

## 2021-01-01 RX ADMIN — FUROSEMIDE 120 MG: 10 INJECTION, SOLUTION INTRAVENOUS at 20:27

## 2021-01-01 RX ADMIN — Medication 1 G: at 10:03

## 2021-01-01 RX ADMIN — DEXAMETHASONE SODIUM PHOSPHATE 6 MG: 4 INJECTION, SOLUTION INTRA-ARTICULAR; INTRALESIONAL; INTRAMUSCULAR; INTRAVENOUS; SOFT TISSUE at 12:00

## 2021-01-01 RX ADMIN — HUMAN INSULIN 14 UNITS/HR: 100 INJECTION, SOLUTION SUBCUTANEOUS at 11:46

## 2021-01-01 RX ADMIN — Medication 2 MG/HR: at 11:09

## 2021-01-01 RX ADMIN — ALBUTEROL SULFATE 2.5 MG: 2.5 SOLUTION RESPIRATORY (INHALATION) at 16:31

## 2021-01-01 RX ADMIN — PROPOFOL 30 MCG/KG/MIN: 10 INJECTION, EMULSION INTRAVENOUS at 09:13

## 2021-01-01 RX ADMIN — PIPERACILLIN AND TAZOBACTAM 3.38 G: 3; .375 INJECTION, POWDER, LYOPHILIZED, FOR SOLUTION INTRAVENOUS at 08:00

## 2021-01-01 RX ADMIN — Medication 20 NG/KG/MIN: at 04:29

## 2021-01-01 RX ADMIN — HUMAN INSULIN 14 UNITS/HR: 100 INJECTION, SOLUTION SUBCUTANEOUS at 23:12

## 2021-01-01 RX ADMIN — Medication 15 ML: at 08:19

## 2021-01-01 RX ADMIN — POLYETHYLENE GLYCOL 3350 17 G: 17 POWDER, FOR SOLUTION ORAL at 07:57

## 2021-01-01 RX ADMIN — HEPARIN SODIUM 1000 UNITS/HR: 10000 INJECTION, SOLUTION INTRAVENOUS at 10:02

## 2021-01-01 RX ADMIN — VANCOMYCIN HYDROCHLORIDE 1000 MG: 1 INJECTION, SOLUTION INTRAVENOUS at 10:37

## 2021-01-01 RX ADMIN — Medication 2 MG/HR: at 22:13

## 2021-01-01 RX ADMIN — PROPOFOL 45 MCG/KG/MIN: 10 INJECTION, EMULSION INTRAVENOUS at 17:24

## 2021-01-01 RX ADMIN — PROPOFOL 30 MCG/KG/MIN: 10 INJECTION, EMULSION INTRAVENOUS at 23:29

## 2021-01-01 RX ADMIN — INSULIN GLARGINE 80 UNITS: 100 INJECTION, SOLUTION SUBCUTANEOUS at 21:42

## 2021-01-01 RX ADMIN — HYDROMORPHONE HYDROCHLORIDE 4 MG: 4 TABLET ORAL at 12:03

## 2021-01-01 RX ADMIN — Medication 2 MG/HR: at 11:55

## 2021-01-01 RX ADMIN — ACETAMINOPHEN 975 MG: 325 TABLET, FILM COATED ORAL at 19:59

## 2021-01-01 RX ADMIN — DEXAMETHASONE SODIUM PHOSPHATE 6 MG: 4 INJECTION, SOLUTION INTRA-ARTICULAR; INTRALESIONAL; INTRAMUSCULAR; INTRAVENOUS; SOFT TISSUE at 12:15

## 2021-01-01 RX ADMIN — Medication 20 NG/KG/MIN: at 18:57

## 2021-01-01 RX ADMIN — ALBUTEROL SULFATE 2.5 MG: 2.5 SOLUTION RESPIRATORY (INHALATION) at 08:38

## 2021-01-01 RX ADMIN — DOCUSATE SODIUM 50 MG AND SENNOSIDES 8.6 MG 2 TABLET: 8.6; 5 TABLET, FILM COATED ORAL at 08:19

## 2021-01-01 RX ADMIN — LORAZEPAM 4 MG: 2 TABLET ORAL at 11:50

## 2021-01-01 RX ADMIN — PROPOFOL 30 MCG/KG/MIN: 10 INJECTION, EMULSION INTRAVENOUS at 05:25

## 2021-01-01 RX ADMIN — HYDROMORPHONE HYDROCHLORIDE 4 MG: 4 TABLET ORAL at 08:03

## 2021-01-01 RX ADMIN — HYDROMORPHONE HYDROCHLORIDE 4 MG: 4 TABLET ORAL at 11:55

## 2021-01-01 RX ADMIN — PROPOFOL 30 MCG/KG/MIN: 10 INJECTION, EMULSION INTRAVENOUS at 08:50

## 2021-01-01 RX ADMIN — Medication 20 NG/KG/MIN: at 04:39

## 2021-01-01 RX ADMIN — PROPOFOL 60 MCG/KG/MIN: 10 INJECTION, EMULSION INTRAVENOUS at 05:08

## 2021-01-01 RX ADMIN — Medication 2 PACKET: at 07:58

## 2021-01-01 RX ADMIN — DOCUSATE SODIUM 50 MG AND SENNOSIDES 8.6 MG 2 TABLET: 8.6; 5 TABLET, FILM COATED ORAL at 19:43

## 2021-01-01 RX ADMIN — VECURONIUM BROMIDE 1.1 MCG/KG/MIN: 1 INJECTION, POWDER, LYOPHILIZED, FOR SOLUTION INTRAVENOUS at 20:29

## 2021-01-01 RX ADMIN — ALBUTEROL SULFATE 2.5 MG: 2.5 SOLUTION RESPIRATORY (INHALATION) at 00:53

## 2021-01-01 RX ADMIN — PROPOFOL 40 MCG/KG/MIN: 10 INJECTION, EMULSION INTRAVENOUS at 06:07

## 2021-01-01 RX ADMIN — ALBUTEROL SULFATE 2.5 MG: 2.5 SOLUTION RESPIRATORY (INHALATION) at 04:18

## 2021-01-01 RX ADMIN — PROPOFOL 30 MCG/KG/MIN: 10 INJECTION, EMULSION INTRAVENOUS at 10:46

## 2021-01-01 RX ADMIN — POTASSIUM CHLORIDE 20 MEQ: 29.8 INJECTION, SOLUTION INTRAVENOUS at 13:59

## 2021-01-01 RX ADMIN — POLYETHYLENE GLYCOL 3350 17 G: 17 POWDER, FOR SOLUTION ORAL at 07:59

## 2021-01-01 RX ADMIN — Medication 20 NG/KG/MIN: at 16:30

## 2021-01-01 RX ADMIN — PIPERACILLIN AND TAZOBACTAM 3.38 G: 3; .375 INJECTION, POWDER, LYOPHILIZED, FOR SOLUTION INTRAVENOUS at 07:49

## 2021-01-01 RX ADMIN — SODIUM BICARBONATE: 84 INJECTION, SOLUTION INTRAVENOUS at 08:16

## 2021-01-01 RX ADMIN — HEPARIN SODIUM 1750 UNITS/HR: 10000 INJECTION, SOLUTION INTRAVENOUS at 14:11

## 2021-01-01 RX ADMIN — VECURONIUM BROMIDE 1 MCG/KG/MIN: 1 INJECTION, POWDER, LYOPHILIZED, FOR SOLUTION INTRAVENOUS at 05:02

## 2021-01-01 RX ADMIN — LORAZEPAM 4 MG: 2 TABLET ORAL at 12:00

## 2021-01-01 RX ADMIN — PROPOFOL 30 MCG/KG/MIN: 10 INJECTION, EMULSION INTRAVENOUS at 08:35

## 2021-01-01 RX ADMIN — Medication 40 MG: at 08:31

## 2021-01-01 RX ADMIN — Medication 2 MG/HR: at 04:56

## 2021-01-01 RX ADMIN — ENOXAPARIN SODIUM 60 MG: 60 INJECTION SUBCUTANEOUS at 07:37

## 2021-01-01 RX ADMIN — HYDROMORPHONE HYDROCHLORIDE 4 MG: 4 TABLET ORAL at 04:12

## 2021-01-01 RX ADMIN — INSULIN GLARGINE 60 UNITS: 100 INJECTION, SOLUTION SUBCUTANEOUS at 19:48

## 2021-01-01 RX ADMIN — PROPOFOL 30 MCG/KG/MIN: 10 INJECTION, EMULSION INTRAVENOUS at 19:15

## 2021-01-01 RX ADMIN — CEFTRIAXONE SODIUM 2 G: 2 INJECTION, POWDER, FOR SOLUTION INTRAMUSCULAR; INTRAVENOUS at 13:30

## 2021-01-01 RX ADMIN — LORAZEPAM 4 MG: 2 TABLET ORAL at 00:47

## 2021-01-01 RX ADMIN — ALBUTEROL SULFATE 2.5 MG: 2.5 SOLUTION RESPIRATORY (INHALATION) at 08:45

## 2021-01-01 RX ADMIN — PIPERACILLIN SODIUM AND TAZOBACTAM SODIUM 4.5 G: 4; .5 INJECTION, POWDER, LYOPHILIZED, FOR SOLUTION INTRAVENOUS at 13:59

## 2021-01-01 RX ADMIN — Medication 2 PACKET: at 08:20

## 2021-01-01 RX ADMIN — LORAZEPAM 4 MG: 2 TABLET ORAL at 20:27

## 2021-01-01 RX ADMIN — HYDROMORPHONE HYDROCHLORIDE 4 MG: 4 TABLET ORAL at 20:04

## 2021-01-01 RX ADMIN — LORAZEPAM 4 MG: 2 TABLET ORAL at 00:20

## 2021-01-01 RX ADMIN — Medication 2 MG/HR: at 08:49

## 2021-01-01 RX ADMIN — ALBUTEROL SULFATE 2.5 MG: 2.5 SOLUTION RESPIRATORY (INHALATION) at 20:08

## 2021-01-01 RX ADMIN — CEFTRIAXONE SODIUM 2 G: 2 INJECTION, POWDER, FOR SOLUTION INTRAMUSCULAR; INTRAVENOUS at 14:03

## 2021-01-01 RX ADMIN — FUROSEMIDE 120 MG: 10 INJECTION, SOLUTION INTRAVENOUS at 10:15

## 2021-01-01 RX ADMIN — Medication 2 MG/HR: at 14:51

## 2021-01-01 RX ADMIN — PROPOFOL 60 MCG/KG/MIN: 10 INJECTION, EMULSION INTRAVENOUS at 08:03

## 2021-01-01 RX ADMIN — HUMAN INSULIN 1 UNITS/HR: 100 INJECTION, SOLUTION SUBCUTANEOUS at 09:36

## 2021-01-01 RX ADMIN — DOCUSATE SODIUM 50 MG AND SENNOSIDES 8.6 MG 2 TABLET: 8.6; 5 TABLET, FILM COATED ORAL at 07:37

## 2021-01-01 RX ADMIN — POLYETHYLENE GLYCOL 3350 17 G: 17 POWDER, FOR SOLUTION ORAL at 08:19

## 2021-01-01 RX ADMIN — LORAZEPAM 4 MG: 2 TABLET ORAL at 03:45

## 2021-01-01 RX ADMIN — INSULIN GLARGINE 80 UNITS: 100 INJECTION, SOLUTION SUBCUTANEOUS at 08:00

## 2021-01-01 RX ADMIN — DOCUSATE SODIUM 50 MG AND SENNOSIDES 8.6 MG 2 TABLET: 8.6; 5 TABLET, FILM COATED ORAL at 20:27

## 2021-01-01 RX ADMIN — Medication 20 NG/KG/MIN: at 23:00

## 2021-01-01 RX ADMIN — PROPOFOL 30 MCG/KG/MIN: 10 INJECTION, EMULSION INTRAVENOUS at 21:58

## 2021-01-01 RX ADMIN — FUROSEMIDE 40 MG: 10 INJECTION, SOLUTION INTRAVENOUS at 00:26

## 2021-01-01 RX ADMIN — GABAPENTIN 800 MG: 250 SUSPENSION ORAL at 13:36

## 2021-01-01 RX ADMIN — Medication 1 G: at 00:48

## 2021-01-01 RX ADMIN — ALBUTEROL SULFATE 2.5 MG: 2.5 SOLUTION RESPIRATORY (INHALATION) at 00:46

## 2021-01-01 RX ADMIN — Medication 1 G: at 00:07

## 2021-01-01 RX ADMIN — Medication 2 MG/HR: at 16:07

## 2021-01-01 RX ADMIN — HYDROMORPHONE HYDROCHLORIDE 4 MG: 4 TABLET ORAL at 00:06

## 2021-01-01 RX ADMIN — INSULIN GLARGINE 30 UNITS: 100 INJECTION, SOLUTION SUBCUTANEOUS at 19:55

## 2021-01-01 RX ADMIN — DOCUSATE SODIUM 50 MG AND SENNOSIDES 8.6 MG 2 TABLET: 8.6; 5 TABLET, FILM COATED ORAL at 08:03

## 2021-01-01 RX ADMIN — MIDAZOLAM 3 MG: 1 INJECTION INTRAMUSCULAR; INTRAVENOUS at 05:48

## 2021-01-01 RX ADMIN — Medication 20 NG/KG/MIN: at 13:05

## 2021-01-01 RX ADMIN — ALBUTEROL SULFATE 2.5 MG: 2.5 SOLUTION RESPIRATORY (INHALATION) at 12:22

## 2021-01-01 RX ADMIN — ALBUTEROL SULFATE 2.5 MG: 2.5 SOLUTION RESPIRATORY (INHALATION) at 08:40

## 2021-01-01 RX ADMIN — VECURONIUM BROMIDE 1 MCG/KG/MIN: 1 INJECTION, POWDER, LYOPHILIZED, FOR SOLUTION INTRAVENOUS at 01:41

## 2021-01-01 RX ADMIN — LORAZEPAM 4 MG: 2 TABLET ORAL at 16:18

## 2021-01-01 RX ADMIN — Medication 1 G: at 07:51

## 2021-01-01 RX ADMIN — HEPARIN SODIUM 2500 UNITS/HR: 10000 INJECTION, SOLUTION INTRAVENOUS at 07:53

## 2021-01-01 RX ADMIN — HUMAN INSULIN 4 UNITS/HR: 100 INJECTION, SOLUTION SUBCUTANEOUS at 14:35

## 2021-01-01 RX ADMIN — ALBUTEROL SULFATE 2.5 MG: 2.5 SOLUTION RESPIRATORY (INHALATION) at 19:49

## 2021-01-01 RX ADMIN — HUMAN INSULIN 6 UNITS/HR: 100 INJECTION, SOLUTION SUBCUTANEOUS at 04:36

## 2021-01-01 RX ADMIN — VECURONIUM BROMIDE 0.6 MCG/KG/MIN: 1 INJECTION, POWDER, LYOPHILIZED, FOR SOLUTION INTRAVENOUS at 05:25

## 2021-01-01 RX ADMIN — HYDROMORPHONE HYDROCHLORIDE 4 MG: 4 TABLET ORAL at 00:12

## 2021-01-01 RX ADMIN — HUMAN INSULIN 17 UNITS/HR: 100 INJECTION, SOLUTION SUBCUTANEOUS at 19:55

## 2021-01-01 RX ADMIN — ALBUTEROL SULFATE 2.5 MG: 2.5 SOLUTION RESPIRATORY (INHALATION) at 11:52

## 2021-01-01 RX ADMIN — Medication 1 G: at 15:05

## 2021-01-01 RX ADMIN — HUMAN INSULIN 11 UNITS/HR: 100 INJECTION, SOLUTION SUBCUTANEOUS at 22:53

## 2021-01-01 RX ADMIN — Medication 13 MG/HR: at 08:57

## 2021-01-01 RX ADMIN — DOCUSATE SODIUM 50 MG AND SENNOSIDES 8.6 MG 2 TABLET: 8.6; 5 TABLET, FILM COATED ORAL at 19:32

## 2021-01-01 RX ADMIN — MIDAZOLAM 2 MG: 1 INJECTION INTRAMUSCULAR; INTRAVENOUS at 08:13

## 2021-01-01 RX ADMIN — ACETAMINOPHEN 975 MG: 325 TABLET, FILM COATED ORAL at 06:49

## 2021-01-01 RX ADMIN — Medication 2 MG/HR: at 08:22

## 2021-01-01 RX ADMIN — ALBUTEROL SULFATE 2.5 MG: 2.5 SOLUTION RESPIRATORY (INHALATION) at 08:57

## 2021-01-01 RX ADMIN — Medication 2 MG/HR: at 01:44

## 2021-01-01 RX ADMIN — Medication 2 MG/HR: at 01:01

## 2021-01-01 RX ADMIN — ACETAMINOPHEN 975 MG: 325 TABLET, FILM COATED ORAL at 08:30

## 2021-01-01 RX ADMIN — Medication 20 NG/KG/MIN: at 13:46

## 2021-01-01 RX ADMIN — Medication 1 G: at 00:18

## 2021-01-01 RX ADMIN — Medication 15 ML: at 07:57

## 2021-01-01 RX ADMIN — ENOXAPARIN SODIUM 60 MG: 60 INJECTION SUBCUTANEOUS at 11:09

## 2021-01-01 RX ADMIN — VECURONIUM BROMIDE 1 MCG/KG/MIN: 1 INJECTION, POWDER, LYOPHILIZED, FOR SOLUTION INTRAVENOUS at 08:27

## 2021-01-01 RX ADMIN — CALCIUM GLUCONATE 2 G: 20 INJECTION, SOLUTION INTRAVENOUS at 08:58

## 2021-01-01 RX ADMIN — Medication 20 NG/KG/MIN: at 06:19

## 2021-01-01 RX ADMIN — Medication 13 MG/HR: at 23:30

## 2021-01-01 RX ADMIN — GABAPENTIN 800 MG: 250 SUSPENSION ORAL at 21:10

## 2021-01-01 RX ADMIN — PROPOFOL 75 MCG/KG/MIN: 10 INJECTION, EMULSION INTRAVENOUS at 11:09

## 2021-01-01 RX ADMIN — HUMAN INSULIN 6 UNITS/HR: 100 INJECTION, SOLUTION SUBCUTANEOUS at 12:16

## 2021-01-01 RX ADMIN — ALBUTEROL SULFATE 2.5 MG: 2.5 SOLUTION RESPIRATORY (INHALATION) at 00:25

## 2021-01-01 RX ADMIN — MIDAZOLAM (PF) 1 MG/ML IN 0.9 % SODIUM CHLORIDE INTRAVENOUS SOLUTION 1 MG/HR: at 03:57

## 2021-01-01 RX ADMIN — PIPERACILLIN AND TAZOBACTAM 3.38 G: 3; .375 INJECTION, POWDER, LYOPHILIZED, FOR SOLUTION INTRAVENOUS at 01:29

## 2021-01-01 RX ADMIN — FUROSEMIDE 120 MG: 10 INJECTION, SOLUTION INTRAVENOUS at 08:20

## 2021-01-01 RX ADMIN — HYDROMORPHONE HYDROCHLORIDE 4 MG: 4 TABLET ORAL at 19:47

## 2021-01-01 RX ADMIN — Medication 1 G: at 23:40

## 2021-01-01 RX ADMIN — LORAZEPAM 4 MG: 2 TABLET ORAL at 12:03

## 2021-01-01 RX ADMIN — DOCUSATE SODIUM 50 MG AND SENNOSIDES 8.6 MG 2 TABLET: 8.6; 5 TABLET, FILM COATED ORAL at 20:29

## 2021-01-01 RX ADMIN — PROPOFOL 45 MCG/KG/MIN: 10 INJECTION, EMULSION INTRAVENOUS at 08:04

## 2021-01-01 RX ADMIN — ALBUTEROL SULFATE 2.5 MG: 2.5 SOLUTION RESPIRATORY (INHALATION) at 01:47

## 2021-01-01 RX ADMIN — PROPOFOL 40 MCG/KG/MIN: 10 INJECTION, EMULSION INTRAVENOUS at 04:03

## 2021-01-01 RX ADMIN — LORAZEPAM 4 MG: 2 TABLET ORAL at 11:55

## 2021-01-01 RX ADMIN — Medication 1 PACKET: at 08:10

## 2021-01-01 RX ADMIN — ALBUTEROL SULFATE 2.5 MG: 2.5 SOLUTION RESPIRATORY (INHALATION) at 08:01

## 2021-01-01 RX ADMIN — Medication 2 MG/HR: at 05:51

## 2021-01-01 RX ADMIN — HYDROMORPHONE HYDROCHLORIDE 4 MG: 4 TABLET ORAL at 08:29

## 2021-01-01 RX ADMIN — PROPOFOL 40 MCG/KG/MIN: 10 INJECTION, EMULSION INTRAVENOUS at 21:43

## 2021-01-01 RX ADMIN — POLYETHYLENE GLYCOL 3350 17 G: 17 POWDER, FOR SOLUTION ORAL at 07:37

## 2021-01-01 RX ADMIN — Medication 2 PACKET: at 14:10

## 2021-01-01 RX ADMIN — Medication 0.03 MCG/KG/MIN: at 16:31

## 2021-01-01 RX ADMIN — Medication 15 MG/HR: at 01:00

## 2021-01-01 RX ADMIN — Medication 20 NG/KG/MIN: at 20:30

## 2021-01-01 RX ADMIN — ENOXAPARIN SODIUM 60 MG: 60 INJECTION SUBCUTANEOUS at 07:57

## 2021-01-01 RX ADMIN — PROPOFOL 50 MCG/KG/MIN: 10 INJECTION, EMULSION INTRAVENOUS at 16:29

## 2021-01-01 RX ADMIN — Medication 20 NG/KG/MIN: at 01:52

## 2021-01-01 RX ADMIN — HYDROMORPHONE HYDROCHLORIDE 4 MG: 4 TABLET ORAL at 12:08

## 2021-01-01 RX ADMIN — ALBUTEROL SULFATE 2.5 MG: 2.5 SOLUTION RESPIRATORY (INHALATION) at 08:02

## 2021-01-01 RX ADMIN — PROPOFOL 40 MCG/KG/MIN: 10 INJECTION, EMULSION INTRAVENOUS at 00:07

## 2021-01-01 RX ADMIN — Medication 20 NG/KG/MIN: at 11:14

## 2021-01-01 RX ADMIN — HUMAN INSULIN 12 UNITS/HR: 100 INJECTION, SOLUTION SUBCUTANEOUS at 01:08

## 2021-01-01 RX ADMIN — INSULIN GLARGINE 40 UNITS: 100 INJECTION, SOLUTION SUBCUTANEOUS at 21:07

## 2021-01-01 RX ADMIN — HYDROMORPHONE HYDROCHLORIDE 4 MG: 4 TABLET ORAL at 15:51

## 2021-01-01 RX ADMIN — INSULIN GLARGINE 80 UNITS: 100 INJECTION, SOLUTION SUBCUTANEOUS at 08:46

## 2021-01-01 RX ADMIN — PANTOPRAZOLE SODIUM 40 MG: 40 INJECTION, POWDER, FOR SOLUTION INTRAVENOUS at 07:37

## 2021-01-01 RX ADMIN — ALBUTEROL SULFATE 2.5 MG: 2.5 SOLUTION RESPIRATORY (INHALATION) at 12:42

## 2021-01-01 RX ADMIN — HYDROMORPHONE HYDROCHLORIDE 4 MG: 4 TABLET ORAL at 11:50

## 2021-01-01 RX ADMIN — Medication 15 ML: at 08:31

## 2021-01-01 RX ADMIN — VECURONIUM BROMIDE 1 MCG/KG/MIN: 1 INJECTION, POWDER, LYOPHILIZED, FOR SOLUTION INTRAVENOUS at 11:55

## 2021-01-01 RX ADMIN — SODIUM CHLORIDE, POTASSIUM CHLORIDE, SODIUM LACTATE AND CALCIUM CHLORIDE 500 ML: 600; 310; 30; 20 INJECTION, SOLUTION INTRAVENOUS at 11:55

## 2021-01-01 RX ADMIN — HUMAN INSULIN 19 UNITS/HR: 100 INJECTION, SOLUTION SUBCUTANEOUS at 11:57

## 2021-01-01 RX ADMIN — Medication 1 G: at 16:41

## 2021-01-01 RX ADMIN — INSULIN GLARGINE 80 UNITS: 100 INJECTION, SOLUTION SUBCUTANEOUS at 20:35

## 2021-01-01 RX ADMIN — Medication 2 PACKET: at 14:48

## 2021-01-01 RX ADMIN — Medication 2 MG/HR: at 18:36

## 2021-01-01 RX ADMIN — VECURONIUM BROMIDE 1 MCG/KG/MIN: 1 INJECTION, POWDER, LYOPHILIZED, FOR SOLUTION INTRAVENOUS at 19:01

## 2021-01-01 RX ADMIN — HYDROMORPHONE HYDROCHLORIDE 4 MG: 4 TABLET ORAL at 00:20

## 2021-01-01 RX ADMIN — HYDROMORPHONE HYDROCHLORIDE 4 MG: 4 TABLET ORAL at 07:59

## 2021-01-01 RX ADMIN — CEFTRIAXONE SODIUM 2 G: 2 INJECTION, POWDER, FOR SOLUTION INTRAMUSCULAR; INTRAVENOUS at 15:28

## 2021-01-01 RX ADMIN — ALBUTEROL SULFATE 2.5 MG: 2.5 SOLUTION RESPIRATORY (INHALATION) at 21:05

## 2021-01-01 RX ADMIN — PIPERACILLIN SODIUM AND TAZOBACTAM SODIUM 4.5 G: 4; .5 INJECTION, POWDER, LYOPHILIZED, FOR SOLUTION INTRAVENOUS at 01:45

## 2021-01-01 RX ADMIN — INSULIN GLARGINE 80 UNITS: 100 INJECTION, SOLUTION SUBCUTANEOUS at 20:28

## 2021-01-01 RX ADMIN — PROPOFOL 30 MCG/KG/MIN: 10 INJECTION, EMULSION INTRAVENOUS at 20:39

## 2021-01-01 RX ADMIN — HUMAN INSULIN 21 UNITS/HR: 100 INJECTION, SOLUTION SUBCUTANEOUS at 08:53

## 2021-01-01 RX ADMIN — ALBUTEROL SULFATE 2.5 MG: 2.5 SOLUTION RESPIRATORY (INHALATION) at 20:20

## 2021-01-01 RX ADMIN — ACETAMINOPHEN 975 MG: 325 TABLET, FILM COATED ORAL at 16:19

## 2021-01-01 RX ADMIN — ALBUMIN HUMAN 50 G: 0.05 INJECTION, SOLUTION INTRAVENOUS at 11:13

## 2021-01-01 RX ADMIN — PROPOFOL 45 MCG/KG/MIN: 10 INJECTION, EMULSION INTRAVENOUS at 20:15

## 2021-01-01 RX ADMIN — PIPERACILLIN AND TAZOBACTAM 3.38 G: 3; .375 INJECTION, POWDER, LYOPHILIZED, FOR SOLUTION INTRAVENOUS at 13:28

## 2021-01-01 RX ADMIN — VECURONIUM BROMIDE 1 MCG/KG/MIN: 1 INJECTION, POWDER, LYOPHILIZED, FOR SOLUTION INTRAVENOUS at 13:19

## 2021-01-01 RX ADMIN — GABAPENTIN 800 MG: 250 SUSPENSION ORAL at 05:10

## 2021-01-01 RX ADMIN — Medication 2 PACKET: at 20:34

## 2021-01-01 RX ADMIN — Medication 20 NG/KG/MIN: at 22:25

## 2021-01-01 RX ADMIN — Medication 20 NG/KG/MIN: at 14:39

## 2021-01-01 RX ADMIN — ALBUTEROL SULFATE 2.5 MG: 2.5 SOLUTION RESPIRATORY (INHALATION) at 15:47

## 2021-01-01 RX ADMIN — HYDROMORPHONE HYDROCHLORIDE 4 MG: 4 TABLET ORAL at 16:22

## 2021-01-01 RX ADMIN — ALBUTEROL SULFATE 2.5 MG: 2.5 SOLUTION RESPIRATORY (INHALATION) at 12:45

## 2021-01-01 RX ADMIN — PIPERACILLIN SODIUM AND TAZOBACTAM SODIUM 4.5 G: 4; .5 INJECTION, POWDER, LYOPHILIZED, FOR SOLUTION INTRAVENOUS at 19:37

## 2021-01-01 RX ADMIN — VECURONIUM BROMIDE 1 MCG/KG/MIN: 1 INJECTION, POWDER, LYOPHILIZED, FOR SOLUTION INTRAVENOUS at 01:33

## 2021-01-01 RX ADMIN — LORAZEPAM 4 MG: 2 TABLET ORAL at 00:16

## 2021-01-01 RX ADMIN — HUMAN INSULIN 20 UNITS/HR: 100 INJECTION, SOLUTION SUBCUTANEOUS at 18:34

## 2021-01-01 RX ADMIN — CEFTRIAXONE SODIUM 2 G: 2 INJECTION, POWDER, FOR SOLUTION INTRAMUSCULAR; INTRAVENOUS at 13:21

## 2021-01-01 RX ADMIN — Medication 40 MG: at 07:50

## 2021-01-01 RX ADMIN — PROPOFOL 75 MCG/KG/MIN: 10 INJECTION, EMULSION INTRAVENOUS at 03:58

## 2021-01-01 RX ADMIN — HEPARIN SODIUM 2650 UNITS/HR: 10000 INJECTION, SOLUTION INTRAVENOUS at 23:00

## 2021-01-01 RX ADMIN — VECURONIUM BROMIDE 1 MCG/KG/MIN: 1 INJECTION, POWDER, LYOPHILIZED, FOR SOLUTION INTRAVENOUS at 04:30

## 2021-01-01 RX ADMIN — Medication 0.06 MCG/KG/MIN: at 09:11

## 2021-01-01 RX ADMIN — CEFTRIAXONE SODIUM 2 G: 2 INJECTION, POWDER, FOR SOLUTION INTRAMUSCULAR; INTRAVENOUS at 13:16

## 2021-01-01 RX ADMIN — MIDAZOLAM (PF) 1 MG/ML IN 0.9 % SODIUM CHLORIDE INTRAVENOUS SOLUTION 13 MG/HR: at 19:44

## 2021-01-01 RX ADMIN — Medication 2 MG/HR: at 13:41

## 2021-01-01 RX ADMIN — Medication 2 PACKET: at 20:29

## 2021-01-01 RX ADMIN — VECURONIUM BROMIDE 0.8 MCG/KG/MIN: 1 INJECTION, POWDER, LYOPHILIZED, FOR SOLUTION INTRAVENOUS at 12:51

## 2021-01-01 RX ADMIN — LORAZEPAM 4 MG: 2 TABLET ORAL at 04:12

## 2021-01-01 RX ADMIN — Medication 1 G: at 08:46

## 2021-01-01 RX ADMIN — DOCUSATE SODIUM 50 MG AND SENNOSIDES 8.6 MG 2 TABLET: 8.6; 5 TABLET, FILM COATED ORAL at 07:59

## 2021-01-01 RX ADMIN — FUROSEMIDE 120 MG: 10 INJECTION, SOLUTION INTRAVENOUS at 13:32

## 2021-01-01 RX ADMIN — Medication 1 G: at 15:33

## 2021-01-01 RX ADMIN — Medication 15 ML: at 11:09

## 2021-01-01 RX ADMIN — FUROSEMIDE 120 MG: 10 INJECTION, SOLUTION INTRAVENOUS at 02:15

## 2021-01-01 RX ADMIN — LORAZEPAM 4 MG: 2 TABLET ORAL at 00:12

## 2021-01-01 RX ADMIN — DEXAMETHASONE SODIUM PHOSPHATE 6 MG: 4 INJECTION, SOLUTION INTRA-ARTICULAR; INTRALESIONAL; INTRAMUSCULAR; INTRAVENOUS; SOFT TISSUE at 14:03

## 2021-01-01 RX ADMIN — MIDAZOLAM (PF) 1 MG/ML IN 0.9 % SODIUM CHLORIDE INTRAVENOUS SOLUTION 13 MG/HR: at 08:08

## 2021-01-01 RX ADMIN — Medication 1 G: at 07:46

## 2021-01-01 RX ADMIN — ALBUTEROL SULFATE 2.5 MG: 2.5 SOLUTION RESPIRATORY (INHALATION) at 15:24

## 2021-01-01 RX ADMIN — PROPOFOL 60 MCG/KG/MIN: 10 INJECTION, EMULSION INTRAVENOUS at 00:38

## 2021-01-01 ASSESSMENT — ACTIVITIES OF DAILY LIVING (ADL)
ADLS_ACUITY_SCORE: 15
ADLS_ACUITY_SCORE: 15
ADLS_ACUITY_SCORE: 13
ADLS_ACUITY_SCORE: 13
ADLS_ACUITY_SCORE: 15
ADLS_ACUITY_SCORE: 15
ADLS_ACUITY_SCORE: 13
ADLS_ACUITY_SCORE: 15
ADLS_ACUITY_SCORE: 22
ADLS_ACUITY_SCORE: 13
ADLS_ACUITY_SCORE: 15
ADLS_ACUITY_SCORE: 15
ADLS_ACUITY_SCORE: 13
ADLS_ACUITY_SCORE: 13
ADLS_ACUITY_SCORE: 11
ADLS_ACUITY_SCORE: 15
ADLS_ACUITY_SCORE: 11
ADLS_ACUITY_SCORE: 13
ADLS_ACUITY_SCORE: 15
ADLS_ACUITY_SCORE: 13
ADLS_ACUITY_SCORE: 17
ADLS_ACUITY_SCORE: 13
ADLS_ACUITY_SCORE: 17
ADLS_ACUITY_SCORE: 15
ADLS_ACUITY_SCORE: 13
ADLS_ACUITY_SCORE: 13
ADLS_ACUITY_SCORE: 15
ADLS_ACUITY_SCORE: 17
ADLS_ACUITY_SCORE: 15
ADLS_ACUITY_SCORE: 13
ADLS_ACUITY_SCORE: 15
ADLS_ACUITY_SCORE: 17
ADLS_ACUITY_SCORE: 15
ADLS_ACUITY_SCORE: 17
ADLS_ACUITY_SCORE: 15
ADLS_ACUITY_SCORE: 15
ADLS_ACUITY_SCORE: 17
ADLS_ACUITY_SCORE: 15
ADLS_ACUITY_SCORE: 15
ADLS_ACUITY_SCORE: 17
ADLS_ACUITY_SCORE: 17
ADLS_ACUITY_SCORE: 13
ADLS_ACUITY_SCORE: 15
ADLS_ACUITY_SCORE: 13
ADLS_ACUITY_SCORE: 13
ADLS_ACUITY_SCORE: 15
ADLS_ACUITY_SCORE: 11
ADLS_ACUITY_SCORE: 13
ADLS_ACUITY_SCORE: 16
ADLS_ACUITY_SCORE: 13
ADLS_ACUITY_SCORE: 13
ADLS_ACUITY_SCORE: 11
ADLS_ACUITY_SCORE: 15
ADLS_ACUITY_SCORE: 17
ADLS_ACUITY_SCORE: 15
ADLS_ACUITY_SCORE: 13
ADLS_ACUITY_SCORE: 13
ADLS_ACUITY_SCORE: 17
ADLS_ACUITY_SCORE: 15
ADLS_ACUITY_SCORE: 13
ADLS_ACUITY_SCORE: 15
ADLS_ACUITY_SCORE: 13
ADLS_ACUITY_SCORE: 15
ADLS_ACUITY_SCORE: 13
ADLS_ACUITY_SCORE: 15
ADLS_ACUITY_SCORE: 13
ADLS_ACUITY_SCORE: 15
ADLS_ACUITY_SCORE: 15
ADLS_ACUITY_SCORE: 13
ADLS_ACUITY_SCORE: 13
ADLS_ACUITY_SCORE: 17
ADLS_ACUITY_SCORE: 15
ADLS_ACUITY_SCORE: 13
ADLS_ACUITY_SCORE: 15
ADLS_ACUITY_SCORE: 13
ADLS_ACUITY_SCORE: 13
ADLS_ACUITY_SCORE: 15
ADLS_ACUITY_SCORE: 13
ADLS_ACUITY_SCORE: 13
ADLS_ACUITY_SCORE: 15
ADLS_ACUITY_SCORE: 15
ADLS_ACUITY_SCORE: 13
ADLS_ACUITY_SCORE: 15
ADLS_ACUITY_SCORE: 22
ADLS_ACUITY_SCORE: 13
ADLS_ACUITY_SCORE: 15
ADLS_ACUITY_SCORE: 15
ADLS_ACUITY_SCORE: 17
ADLS_ACUITY_SCORE: 13
ADLS_ACUITY_SCORE: 11
ADLS_ACUITY_SCORE: 13
ADLS_ACUITY_SCORE: 17
ADLS_ACUITY_SCORE: 17
ADLS_ACUITY_SCORE: 13
ADLS_ACUITY_SCORE: 15
ADLS_ACUITY_SCORE: 13
ADLS_ACUITY_SCORE: 15
ADLS_ACUITY_SCORE: 15
ADLS_ACUITY_SCORE: 13
ADLS_ACUITY_SCORE: 13
ADLS_ACUITY_SCORE: 17
ADLS_ACUITY_SCORE: 17
ADLS_ACUITY_SCORE: 13
ADLS_ACUITY_SCORE: 15
ADLS_ACUITY_SCORE: 15
ADLS_ACUITY_SCORE: 22
ADLS_ACUITY_SCORE: 22
ADLS_ACUITY_SCORE: 15
ADLS_ACUITY_SCORE: 13
ADLS_ACUITY_SCORE: 11
ADLS_ACUITY_SCORE: 13
ADLS_ACUITY_SCORE: 15
ADLS_ACUITY_SCORE: 13
ADLS_ACUITY_SCORE: 13
ADLS_ACUITY_SCORE: 17
ADLS_ACUITY_SCORE: 13
ADLS_ACUITY_SCORE: 17
ADLS_ACUITY_SCORE: 15
ADLS_ACUITY_SCORE: 13
ADLS_ACUITY_SCORE: 15
ADLS_ACUITY_SCORE: 13
ADLS_ACUITY_SCORE: 11
ADLS_ACUITY_SCORE: 17
ADLS_ACUITY_SCORE: 13
ADLS_ACUITY_SCORE: 15
ADLS_ACUITY_SCORE: 13
ADLS_ACUITY_SCORE: 15
ADLS_ACUITY_SCORE: 13
ADLS_ACUITY_SCORE: 15
ADLS_ACUITY_SCORE: 22
ADLS_ACUITY_SCORE: 22
ADLS_ACUITY_SCORE: 15
ADLS_ACUITY_SCORE: 13
ADLS_ACUITY_SCORE: 15
ADLS_ACUITY_SCORE: 13
ADLS_ACUITY_SCORE: 15
ADLS_ACUITY_SCORE: 13
ADLS_ACUITY_SCORE: 17
ADLS_ACUITY_SCORE: 13
ADLS_ACUITY_SCORE: 17
ADLS_ACUITY_SCORE: 15
ADLS_ACUITY_SCORE: 13
ADLS_ACUITY_SCORE: 13
ADLS_ACUITY_SCORE: 12
ADLS_ACUITY_SCORE: 15
ADLS_ACUITY_SCORE: 13
ADLS_ACUITY_SCORE: 15
ADLS_ACUITY_SCORE: 17
ADLS_ACUITY_SCORE: 13
ADLS_ACUITY_SCORE: 15
ADLS_ACUITY_SCORE: 13
ADLS_ACUITY_SCORE: 17
ADLS_ACUITY_SCORE: 15
ADLS_ACUITY_SCORE: 15
ADLS_ACUITY_SCORE: 13
ADLS_ACUITY_SCORE: 17
ADLS_ACUITY_SCORE: 13
ADLS_ACUITY_SCORE: 15
ADLS_ACUITY_SCORE: 13
ADLS_ACUITY_SCORE: 15
ADLS_ACUITY_SCORE: 13
ADLS_ACUITY_SCORE: 15
ADLS_ACUITY_SCORE: 13
ADLS_ACUITY_SCORE: 13
ADLS_ACUITY_SCORE: 15
ADLS_ACUITY_SCORE: 15
ADLS_ACUITY_SCORE: 13
ADLS_ACUITY_SCORE: 13
ADLS_ACUITY_SCORE: 15
ADLS_ACUITY_SCORE: 15
ADLS_ACUITY_SCORE: 13
ADLS_ACUITY_SCORE: 17
ADLS_ACUITY_SCORE: 13
ADLS_ACUITY_SCORE: 17
ADLS_ACUITY_SCORE: 13
ADLS_ACUITY_SCORE: 17
ADLS_ACUITY_SCORE: 15

## 2021-01-01 ASSESSMENT — MIFFLIN-ST. JEOR: SCORE: 1830.5

## 2021-10-30 PROBLEM — J96.01 ACUTE RESPIRATORY FAILURE WITH HYPOXIA (H): Status: ACTIVE | Noted: 2021-01-01

## 2021-10-30 NOTE — PLAN OF CARE
ICU End of Shift Summary. See flowsheets for vital signs and detailed assessment.    Changes this shift: Arrived from OSH @ 0330. Pt satting in 80s on 100% FiO2 for ~15 min until recovered. Initial ABG with PF of 63. Repeat ABG with . Full strength flolan started. RASS -3. Intermittenly restless x2 with arms up at tube requiring PRN fent and versed. Not following commands. 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 SR. CMV 85%/18/410/14. Hypoactive BS; per OSH last BM 10/27. NG clamped; waiting on verification. Ocampo in place.    Plan: Get central access. Trend ABGs for need for proning/paralyzing. Continue to follow POC and notify team of any changes.    Problem: ARDS (Acute Respiratory Distress Syndrome)  Goal: Effective Oxygenation  Outcome: No Change

## 2021-10-30 NOTE — PROCEDURES
[facility]    Triple Lumen PICC Placement    Date/Time: 10/30/2021 10:44 AM  Performed by: Keith Gupta RN  Authorized by: Maddison Whitman MD   Indications: vascular access    UNIVERSAL PROTOCOL   Site Marked: Yes  Prior Images Obtained and Reviewed:  Yes  Required items: Required blood products, implants, devices and special equipment available    Patient identity confirmed:  Verbally with patient  NA - No sedation, light sedation, or local anesthesia  Confirmation Checklist:  Patient's identity using two indicators, relevant allergies, procedure was appropriate and matched the consent or emergent situation and correct equipment/implants were available  Time out: Immediately prior to the procedure a time out was called    Universal Protocol: the Joint Commission Universal Protocol was followed    Preparation: Patient was prepped and draped in usual sterile fashion           ANESTHESIA    Anesthesia: Local infiltration  Local Anesthetic:  Lidocaine 1% without epinephrine  Anesthetic Total (mL):  5      SEDATION    Patient Sedated: No        Preparation: skin prepped with ChloraPrep  Skin prep agent: skin prep agent completely dried prior to procedure  Sterile barriers: maximum sterile barriers were used: cap, mask, sterile gown, sterile gloves, and large sterile sheet  Hand hygiene: hand hygiene performed prior to central venous catheter insertion  Type of line used: PICC and Power PICC  Catheter type: triple lumen  Lumen type: non-valved  Catheter size: 5 Fr  Brand: Bard  Lot number: [id number]  Placement method: venipuncture, MST, ultrasound and tip confirmation system  Number of attempts: 1  Successful placement: yes  Orientation: right  Location: basilic vein (vd 0.43 cm)  Arm circumference: adults 10 cm  Extremity circumference: 24  Visible catheter length: 2  Total catheter length: 44  Dressing and securement: blood cleaned with CHG,  chlorhexidine disc applied, dressing applied, glue, line secured, securement device, site cleaned and sterile dressing applied  Post procedure assessment: blood return through all ports and free fluid flow (3cg technology)  PROCEDURE   Patient Tolerance:  Patient tolerated the procedure well with no immediate complications  Describe Procedure: PICC ok to use

## 2021-10-30 NOTE — PROGRESS NOTES
MEDICAL ICU Progress Note  10/30/2021    Date of Hospital Admission: 10/23/21  Date of ICU Admission: 10/30/21  Reason for Critical Care Admission: Acute Hypoxic Respiratory failure due to Covid PNA  Date of Service (when I saw the patient): 10/30/2021    ASSESSMENT: Trinidad Gillette is a 31 year old female with PMH diabetes, bipolar disorder, obesity and drug abuse who was admitted on 10/26/21 to [facility] for covid PNA. She was intubated on 10/29/21 and transferred to the [facility] MICU for further management.     CHANGES and MAJOR THINGS TODAY:   PICC line placement  Arterial line placement  Decrease TV to 6/kg of IBW- 360  Increase RR  Increase sedation  Paralyze  Prone  Follow ABGs  Change Fentanyl to Dilaudid    PLAN:    Neuro:  # Pain and sedation  -Midazolam gtt + prn  -Propofol gtt  -Fentanyl gtt + prn  -PTA gabapentin 800 mg TID    # Chemical paralysis for vent synchrony  - Initiate vecuronium drip  - Goal TOF 2/4  - Follow BIS monitor for adequate sedation while paralyzed    # Retinal Detachment L eye  -Patient is blind in L eye and pupil is nonreactive to light    Pulmonary:  # Acute Hypoxic Respiratory Failure due to Covid PNA  # ARDS  Symptom onset 10/21. Positive test on 10/22. Hospitalized on 10/26. Intubated on 10/29.  Ventilation Mode: CMV/AC  (Continuous Mandatory Ventilation/ Assist Control)  FiO2 (%): 100 %  Rate Set (breaths/minute): 22 breaths/min  Tidal Volume Set (mL): 360 mL  PEEP (cm H2O): 14 cmH2O  Oxygen Concentration (%): 85 %  Resp: 25  -Dexamethasone 6mg x 10d (first dose on 10/27)  -Tocilizumab 10/27  -Duoneb q4h PRN  -Full strength veletri  -Plan to prone after arterial line placement  -Paralyze with vecuronium drip for better vent compliance    Cardiovascular:  # Hypotension, likely related to sedation and mild hypovolemia  - Norepi drip to keep MAP >65  - Place arterial line today    GI/Nutrition:  # Risk for malnutrition  # Morbid obesity  -PPI  -Bowel  Regimen  -Nutrition consult today to initiate TF    Renal/Fluids/Electrolytes:  # TOI, resolveed  Unclear creatinine baseline. Noted to be 1.4 on 10/27 per OSH records.   - Creat 0.9 on admit here  - Follow BMP  - Electrolyte replacement per ICU protocol    Endocrine:  # Poorly controlled T2DM  Hgb A1C 12.1 on admission; had a hypoglycemic episode on arrival (49)- Decrease PTA lantus  -Glargine 40u BID (1/2 home dose)  -High resistance sliding scale insulin   -Hypoglycemic protocol    ID:  # Covid-19 PNA  -Decadron (10/27-  -Remdesivir deferred now that she is intubated  -Toci on 10/27  -Trend daily inflammatory markers x4 days    Cultures:    Blood culture 10/30    Sputum culture 10/30    MRSA nasal swab 10/30    Legionella 10/30    Streptococcus 10/30    Hematology:    # Coagulopathy 2/2 COVID-19  D-dimer on admission >20  - Enoxaparin 0.5mg/kg    Musculoskeletal:  No active concerns    Skin:  No active Concerns    General Cares/Prophylaxis:    DVT Prophylaxis: Enoxaparin (Lovenox) SQ  GI Prophylaxis: PPI  Restraints: None  Family Communication: To be updated in AM  Code Status: Full    Lines/tubes/drains:  - PIV  - PICC     Disposition:  - Medical ICU     Patient seen and findings/plan discussed with medical ICU staff, Dr. Lopez.    Aliyah Rousseau NP-C      Attending note:  Patient seen, examined and discussed with the GLENDY. All data reviewed including vital signs, medications, laboratory studies and imaging.  I agree with the assessment and plan as outlined in the above note.  The patient remains critically ill with acute respiratory failure, diabetes mellitus and COVID19.  I personally adjusted the patient's ventilator to treat the acute respiratory failure, and followed hemodynamics with titration of vasopressors.  Severe acute respiratory failure and intermittent hypotension overnight.  Will place prone today and initiate paralysis.  No secondary infection identified but need to follow closely.        Total  Critical Care time excluding time for procedures and teaching was 40 minutes.     Mackenzie Lopez MD  [phone]     -----------------------------------------------------------------------  Overnight Events: Patient admitted at 0330 in the AM as a transfer from [place], [place].  Issues with vent compliance and being adequately sedated.     PHYSICAL EXAMINATION:  Temp:  [98.6  F (37  C)] 98.6  F (37  C)  Pulse:  [] 111  Resp:  [18-28] 23  BP: ()/() 86/39  FiO2 (%):  [85 %-100 %] 100 %  SpO2:  [84 %-100 %] 93 %     General: Intubated and sedated, lying supine in bed  HEENT: NCAT, L pupil irregular is nonreactive and dilated (chronic), ET in place  Neuro: Sedated, not following commands, no spontaneous movement noted  Pulm/Resp: Breath sounds clear and diminished bilaterally throughout  CV: Tachycardic, RR, no mrg  Abdomen: Soft, non-distended, non-tender  : hart catheter in place, urine yellow and clear  Incisions/Skin: No noted skin breakdowns, skin graft on R thigh    LABS: Reviewed.   Arterial Blood Gases   Recent Labs   Lab 10/30/21  0541 10/30/21  0337   PH 7.34* 7.22*   PCO2 41 58*   PO2 146* 63*   HCO3 22 24     Complete Blood Count   Recent Labs   Lab 10/30/21  0703   WBC 8.3   HGB 11.5*        Basic Metabolic Panel  Recent Labs   Lab 10/30/21  0806 10/30/21  0746 10/30/21  0703 10/30/21  0331   NA  --   --  141  --    POTASSIUM  --   --  5.0  --    CHLORIDE  --   --  114*  --    CO2  --   --  20  --    BUN  --   --  26  --    CR  --   --  0.94  --    * 49* 62* 91     Liver Function Tests  Recent Labs   Lab 10/30/21  0703   ALT 32   ALKPHOS 85   BILITOTAL 0.3   ALBUMIN 1.8*   INR 0.99     Coagulation Profile  Recent Labs   Lab 10/30/21  0703   INR 0.99   PTT 25       IMAGING:  Recent Results (from the past 24 hour(s))   XR Chest Port 1 View    Narrative    EXAM: XR CHEST PORT 1 VIEW  10/30/2021 5:04 AM     HISTORY:  Evaluate ET tube positioning       COMPARISON:  None    TECHNIQUE:  Portable AP radiograph of the chest.    FINDINGS:   Support devices: Endotracheal tube tip projects over the midthoracic  trachea. Enteric tube courses below the diaphragm and beyond the  field-of-view.    The trachea is midline. Heart size is within normal limits. Diffuse  mixed interstitial and airspace opacities bilaterally. No pleural  effusion or pneumothorax.    No osseous abnormality. No acute abnormality in the visualized upper  abdomen.      Impression    IMPRESSION:   1. Endotracheal tube tip projects over the midthoracic trachea.  2. Diffuse mixed interstitial and airspace opacities bilaterally  compatible with COVID pneumonia.     I have personally reviewed the examination and initial interpretation  and I agree with the findings.    NAVEEN MARCANO MD         SYSTEM ID:  [id number]   XR Abdomen Port 1 View    Narrative    EXAMINATION:  XR ABDOMEN PORT 1 VIEWS 10/30/2021 5:04 AM     COMPARISON: Same-day chest radiograph    HISTORY: Evaluate OG tube    TECHNIQUE: Frontal views of the abdomen.    FINDINGS:     Devices: Enteric tube tip projects over the distal stomach.     No abnormally dilated loops of bowel. No pneumatosis or portal venous  gas.    Mixed interstitial and airspace opacities in the visualized lungs.      Impression    IMPRESSION:   1. Enteric tube tip projects over the distal stomach.  2. Nonobstructive bowel gas pattern.  3. Mixed interstitial and airspace opacities in the visualized lungs.  Please see separate chest radiograph.    I have personally reviewed the examination and initial interpretation  and I agree with the findings.    NAVEEN MARCANO MD         SYSTEM ID:  [id number]

## 2021-10-30 NOTE — PHARMACY-CONSULT NOTE
Pharmacy Tube Feeding Consult    Medication reviewed for administration by feeding tube and for potential food/drug interactions.    Recommendation: No changes are needed at this time.     Pharmacy will continue to follow as new medications are ordered.    Grace Steiner, AshelyD

## 2021-10-30 NOTE — PROCEDURES
[facility]     Arterial line placement     Date/Time: 10/30/21 2:30 PM  Performed by: Lv Reeves MD  Authorized by: Swapnil Lopez MD     UNIVERSAL PROTOCOL   Site Marked: Yes  Prior Images Obtained and Reviewed:  Yes  Required items: Required blood products, implants, devices and special equipment available    Patient identity confirmed:  Verbally with patient and arm band  NA - No sedation, light sedation, or local anesthesia  Confirmation Checklist:  Relevant allergies, procedure was appropriate and matched the consent or emergent situation and correct equipment/implants were available  Universal Protocol: the Joint Commission Universal Protocol was followed    Preparation: Patient was prepped and draped in usual sterile fashion    ESBL (mL):  2        Indication: multiple ABGs respiratory failure hemodynamic monitoring  Location:  Right radial        SEDATION    Patient Sedated: Yes    Sedation:  See MAR for details  Vital signs: Vital signs monitored during sedation       PROCEDURE DETAILS     Needle Gauge:  18  Seldinger technique: Seldinger technique used     Number of Attempts:  1  Post-procedure:  Line sutured and dressing applied     PROCEDURE   Patient Tolerance:  Patient tolerated the procedure well with no immediate complications    Lv Reeves MD  PGY-2 Internal Medicine  10/30/21       Attending note:  Bedside placement of arterial line for invasive hemodynamic monitoring and frequent arterial blood gas sampling.  Uncomplicated procedure.  I was present for all key portions of the procedure and was immediately available for all other portions of the procedure.    Mackenzie Lopez MD  [phone]

## 2021-10-30 NOTE — PLAN OF CARE
Admitted/transferred from: [facility] in [facility] @ 0330  Reason for admission/transfer: ICU care  Patient status upon admission/transfer: Sating 80% on 100% FiO2, otherwise stable  Interventions: Labs, xray, ABG, sedation  Plan: Central line and prone/paralyze  2 RN skin assessment: completed by Maura  Result of skin assessment and interventions/actions: Skin graft site on R thigh, scared graft placement left hip, generalized bruising on abdomen, redness in itz area, crusty eyes, blanchable reddness on coccyx, linear scar on spine  Height, weight, drug calc weight: Done  Patient belongings (see Flowsheet - Adult Profile for details): Phone and charge (in safe), pj bottoms and top, socks and underwear  MDRO education (if applicable): Done

## 2021-10-30 NOTE — PLAN OF CARE
ICU End of Shift Summary. See flowsheets for vital signs and detailed assessment.    Changes this shift:     Labile with unresponsiveness/agitation this am on fentanyl, versed, and propofol. Intermittently waking up, bucking vent, raising arms, RR up to 40, Sp02 down to 77% during these episodes. Team notified; adjusted sedation to Diluaded instead of Fentanyl, versed parameters increased and bumps from continuous infusions given. Pt still requiring 100% Fi02 to maintain sats >90. Vent settings changed to rate 22 . Decision made to paralyze this afternoon for vent compliance. Sedation goal RASS -5 achieved and BIS 27. Vec started at 1251 and bolus given. Titrated to 0.9 to achieve 2/4 twitches (baseline of 4/4 obtained prior to paralyzing).     Lactic 3.2 this am, patient hypotensive. Two 500ml LR boluses given and Levo started this am. Levo titrated as high as 0.11 to maintain MAP>65, currently at 0.7. Repeat lactic down to 2.2. A-Line currently being attempted by MD. PICC access obtained.     Tmax 101.5, MD notified. Blood cultures already obtained this morning. UA/UC ordered. Pt having very decreased UO, will send sample when able. No BM today but bowel meds given.     Blood sugar 49 this am, amp of D50 given and recheck WDL. Tube feeds started this afternoon with goal 45/hr. D-Dimer critically high, MD notified. Replaced calcium (iCal 4.1)    Updated sister x2 and MD also updated sister.     Plan: Prone this afternoon after arterial access is obtained. Send ABG and UA/UC when able. Monitor and notify team of any changes.         Problem: Adult Inpatient Plan of Care  Goal: Plan of Care Review  Outcome: No Change  Goal: Absence of Hospital-Acquired Illness or Injury  Outcome: No Change  Intervention: Identify and Manage Fall Risk  Recent Flowsheet Documentation  Taken 10/30/2021 1200 by Elizabeth Sun RN  Safety Promotion/Fall Prevention:   activity supervised   fall prevention program maintained    clutter free environment maintained   lighting adjusted   increase visualization of patient   increased rounding and observation   room near nurse's station   room organization consistent   safety round/check completed   treat reversible contributory factors   treat underlying cause  Taken 10/30/2021 0800 by Elizabeth Sun RN  Safety Promotion/Fall Prevention:   activity supervised   fall prevention program maintained   clutter free environment maintained   lighting adjusted   increase visualization of patient   increased rounding and observation   room near nurse's station   room organization consistent   safety round/check completed   treat reversible contributory factors   treat underlying cause  Intervention: Prevent Skin Injury  Recent Flowsheet Documentation  Taken 10/30/2021 1400 by Elizabeth Sun RN  Body Position:   turned   side-lying   right   lower extremity elevated   upper extremity elevated  Taken 10/30/2021 1200 by Elizabeth Sun RN  Body Position:   turned   side-lying   lower extremity elevated   upper extremity elevated   right  Taken 10/30/2021 1000 by Elizabeth Sun RN  Body Position:   turned   side-lying   lower extremity elevated   upper extremity elevated   left  Taken 10/30/2021 0800 by Elizabeth Sun RN  Body Position:   turned   side-lying   right   lower extremity elevated   upper extremity elevated  Intervention: Prevent Infection  Recent Flowsheet Documentation  Taken 10/30/2021 1200 by Elizabeth Sun, RN  Infection Prevention:   environmental surveillance performed   equipment surfaces disinfected   hand hygiene promoted   personal protective equipment utilized   rest/sleep promoted   single patient room provided   visitors restricted/screened  Taken 10/30/2021 0800 by Elizabeth Sun, RN  Infection Prevention:   environmental surveillance performed   equipment surfaces disinfected   hand hygiene promoted   personal protective equipment utilized   rest/sleep promoted    single patient room provided   visitors restricted/screened  Goal: Optimal Comfort and Wellbeing  Outcome: No Change     Problem: Risk for Delirium  Goal: Optimal Coping  Outcome: No Change  Goal: Improved Behavioral Control  Outcome: No Change  Goal: Improved Attention and Thought Clarity  Outcome: No Change  Goal: Improved Sleep  Outcome: No Change

## 2021-10-30 NOTE — PROGRESS NOTES
CLINICAL NUTRITION SERVICES - ASSESSMENT NOTE     Nutrition Prescription    RECOMMENDATIONS FOR MDs/PROVIDERS TO ORDER:  --Additional water flushes as per primary team.     Malnutrition Status:    Severe malnutrition in the context of acute condition     Recommendations already ordered by Registered Dietitian (RD):  --Additional water flushes and bowel regimen as per primary team.      Malnutrition Status:    Patient does not meet two of the established criteria necessary for diagnosing malnutrition     Recommendations already ordered by Registered Dietitian (RD):  --Enteral tube feed recommendation:    Access: NGT  Dosing wt: 73 kg adjusted wt from admit wt of 115.3 kg     EN: Start TF with Osmolite 1.5 @15 ml/hr, (when AXR verified) advance by 10 ml every 8 hours until goal rate @ 45 ml/hr, (Do not start or advance TF rate unless K+ >3.0, Mg++ > 1.5,  and Phos > 1.9).     Provision: Osmolite 1.5 @ 45 ml/hr (1080 ml/day) to provide:1620 kcal's (22 kcal/kg), 67 gm PRO (0.9 gm/kg), 822 ml free H2O, 219 gm CHO, and 0 gm fiber daily.    Modules: Prosource (1 pkts QID) to meet high protein needs ( 160 kcal + 44 gm protein/ 1.5 gm/kg)    Water flushes: Minimum 30 ml Q 4 hrs water flushes for tube patency. MD to adjust as needed.    --HOB > 30 degrees with all gastric feeding   --Certavite 15 ml daily   --Lyte monitoring with TF start and advancement ordered    Future/Additional Recommendations:  If Vital HP is available, may switch TF formula  TF tolerance   Should TF be interrupted- at risk for hypoglycemia with NPH insulin on board.       REASON FOR ASSESSMENT  Trinidad Gillette is a/an 31 year old female assessed by the dietitian for Provider Order - Registered Dietitian to Assess and Order TF per Medical Nutrition Therapy Protocol    Chart reviewed: per H&P  --PMH: DM2, bipolar disorder, obesity, Retinal Detachment L eye (blind in Left eye).     --Admitted on 10/23/21 to [facility] for covid PNA ( Symptom  "onset 10/21, Positive COVID  test on 10/22 ). Patient progressively declined and required intubation from BiPAP  on 10/29/21 and transferred to U MICU today 10/23 for further cares.     --Patient was admitted for Acute Hypoxic Respiratory failure due to Covid PNA    NUTRITION HISTORY  Unable to obtain due to patient intubation    CURRENT NUTRITION ORDERS  Diet: NPO  EN access: NG clamped; waiting on verification per RN    LABS  BUN:26, Cr:0.94  K+:5.0, Mg++:2.6 (H), Phos:5.4(H) ??  Glucose:62 (L), No results found for: A1C  CRP:N/A       MEDICATIONS  Propofol gtt, Fentanyl gtt + prn  Dexamethasone 6mg x 10d (10/27-----)  T2DM ( on Glargine 40u BID (1/2 home dose) and high resistance sliding scale insulin     ANTHROPOMETRICS  Height: 167.6 cm (5' 6\")  Most Recent Weight: 115.3 kg (254 lb 3.1 oz) admit wt on 10/30/21  IBW: 59 kg (195% IBW)  BMI: 41.03 kg /m2 Obesity Grade III BMI >40  Weight History: 7.6 kg wt loss since early this month (6% net wt loss in 3 weeks)  Wt Readings from Last 10 Encounters:   10/30/21 115.3 kg (254 lb 3.1 oz)   per Care every where:  10/05/2021 --122.9 kg (271 lb)    Dosing Weight: 73 kg adjusted wt from admit wt of 115.3 kg on 10/30 and IBW of 59 kg     ASSESSED NUTRITION NEEDS  Estimated Energy Needs: 1460- 1825 kcals/day (20 - 25 kcals/kg)  Justification: Maintenance  Estimated Protein Needs: 110-146  grams protein/day (1.5 - 2 grams of pro/kg)  Justification: Hypercatabolism with acute illness, obese , pending renal function   Estimated Fluid Needs: (1 mL/kcal)   Justification: Maintenance    PHYSICAL FINDINGS  See malnutrition section below.    MALNUTRITION  % Intake: Likely <50% >5 days PTA / this admit -> Severe   % Weight Loss: 6% net wt loss in 3 weeks) - Severe   Subcutaneous Fat Loss: Unable to assess  Muscle Loss: Unable to assess  Fluid Accumulation/Edema: Unable to assess  Malnutrition Diagnosis: Severe malnutrition in the context of acute condition     NUTRITION " DIAGNOSIS  Inadequate oral intake related to ARDS with mechanical ventilation inhibiting ability to take nutrition PO as evidenced by NPO status x at least 1 day since admit, requiring the start of enteral nutrition to meet 100% of needs.       INTERVENTIONS  Implementation  Nutrition Education: Not appropriate at this time due to patient condition   Enteral Nutrition - Initiate  Feeding tube flush     Goals  Total avg nutritional intake to meet a minimum of 20 kcal/kg and 1.5 gm PRO/kg daily (per dosing wt 73 kg adjusted ).     Monitoring/Evaluation  Progress toward goals will be monitored and evaluated per protocol.    Cherelle De Leon RD/MARNI  Relief Weekend RD pager:  [phone]

## 2021-10-30 NOTE — H&P
MEDICAL ICU H&P  10/30/2021    Date of Hospital Admission: 10/23/21  Date of ICU Admission: 10/30/21  Reason for Critical Care Admission: Acute Hypoxic Respiratory failure due to Covid PNA  Date of Service (when I saw the patient): 10/30/2021    ASSESSMENT: Trinidad Gillette is a 31 year old female with PMH diabetes, bipolar disorder, obesity who was admitted on 10/23/21 to [facility] for covid PNA. She was intubated on 10/29/21 and transferred to  MICU for further cares.     CHANGES and MAJOR THINGS TODAY:   -Transferred to U MICU    PLAN:    Neuro:  # Pain and sedation  -Midazolam gtt + prn  -Propofol gtt  -Fentanyl gtt + prn  -Consider vec pending course  -PTA gabapentin 800 mg TID    #Retinal Detachment L eye  -Patient is blind in L eye and pupil is nonreactive to light    Pulmonary:  # Acute Hypoxic Respiratory Failure due to Covid PNA  # ARDS  Symptom onset 10/21. Positive test on 10/22. Hospitalized on 10/26. Intubated on 10/29.    -Dexamethasone 6mg x 10d (10/27-  -Tocilizumab (10/27-10/28)  -Duoneb q4h PRN  -Start velitri  -Covid admission labs ordered  -DVT prophylaxis: Enoxaparin BID    Cardiovascular:  No Active Concerns. Monitor for sedation related hypotension    GI/Nutrition:  -PPI  -Bowel Regimen    Renal/Fluids/Electrolytes:  # TOI  Unclear creatinine baseline. Noted to be 1.4 on 10/27 per OSH records.   - Repeat BMP    Endocrine:  # T2DM  -Glargine 40u BID (1/2 home dose)  -High resistance sliding scale insulin   -A1c pending    ID:  # Covid PNA  -See above    #Concern for secondary infection  -Procalcitonin ordered will defer abx at this time    Hematology:    -CBC with diff on admit    Musculoskeletal:  No active concerns    Skin:  No active Concerns    General Cares/Prophylaxis:    DVT Prophylaxis: Enoxaparin (Lovenox) SQ  GI Prophylaxis: PPI  Restraints: None  Family Communication: To be updated in AM  Code Status: Full    Lines/tubes/drains:  - PIV  - PICC order  placed    Disposition:  - Medical ICU     Patient seen and findings/plan discussed with medical ICU staff, Dr. Whitman.    Davidson Young    -----------------------------------------------------------------------    HISTORY PRESENTING ILLNESS: Trinidad Gillette is a 31 year old female with [facility] diabetes, bipolar disorder, obesity who was admitted on 10/23/21 to [facility] for covid PNA. She was intubated on 10/29/21 and transferred to  MICU for further cares.     Per chart review, patient is first had symptoms on 10/21. Tested positive on 10/22. Hospitalized on 10/26. Has progressively declined and required intubation from bipap on 10/29. Received toci and dexamethasone.     REVIEW OF SYSTEMS: Unable to obtain due to patient intubation    PAST MEDICAL HISTORY:   No past medical history on file.  SURGICAL HISTORY:  No past surgical history on file.  SOCIAL HISTORY:  Social History     Socioeconomic History     Marital status: Not on file     Spouse name: Not on file     Number of children: Not on file     Years of education: Not on file     Highest education level: Not on file   Occupational History     Not on file   Tobacco Use     Smoking status: Not on file   Substance and Sexual Activity     Alcohol use: Not on file     Drug use: Not on file     Sexual activity: Not on file   Other Topics Concern     Not on file   Social History Narrative     Not on file     Social Determinants of Health     Financial Resource Strain:      Difficulty of Paying Living Expenses:    Food Insecurity:      Worried About Running Out of Food in the Last Year:      Ran Out of Food in the Last Year:    Transportation Needs:      Lack of Transportation (Medical):      Lack of Transportation (Non-Medical):    Physical Activity:      Days of Exercise per Week:      Minutes of Exercise per Session:    Stress:      Feeling of Stress :    Social Connections:      Frequency of Communication with Friends and Family:       Frequency of Social Gatherings with Friends and Family:      Attends [employer] Services:      Active Member of Clubs or Organizations:      Attends Club or Organization Meetings:      Marital Status:    Intimate Partner Violence:      Fear of Current or Ex-Partner:      Emotionally Abused:      Physically Abused:      Sexually Abused:      FAMILY HISTORY:   No family history on file.  ALLERGIES:   Not on File  MEDICATIONS:  No current facility-administered medications on file prior to encounter.  No current outpatient medications on file prior to encounter.      PHYSICAL EXAMINATION:  Temp:  [98.6  F (37  C)] 98.6  F (37  C)  Pulse:  [] 91  Resp:  [18-28] 22  BP: ()/() 95/50  FiO2 (%):  [85 %-100 %] 85 %  SpO2:  [84 %-100 %] 99 %  General: Young woman, obese, intubated, sedated  HEENT: NCAT, L pupil is nonreactive and dilated (chronic), ET in place  Neuro: No focal deficits noted  Pulm/Resp: Coarse breath sounds bilaterally with good air movement, no noted wheezing  CV: Tachycardic, RR, no mrg  Abdomen: Soft, non-distended, non-tender  : hart catheter in place, urine yellow and clear  Incisions/Skin: No noted skin breakdowns, skin graft on R thigh    LABS: Reviewed.   Arterial Blood Gases   Recent Labs   Lab 10/30/21  0541 10/30/21  0337   PH 7.34* 7.22*   PCO2 41 58*   PO2 146* 63*   HCO3 22 24     Complete Blood Count   Recent Labs   Lab 10/30/21  0703   WBC 8.3   HGB 11.5*     Basic Metabolic Panel  Recent Labs   Lab 10/30/21  0806 10/30/21  0746 10/30/21  0331   * 49* 91     Liver Function Tests  Recent Labs   Lab 10/30/21  0703   INR 0.99     Coagulation Profile  Recent Labs   Lab 10/30/21  0703   INR 0.99   PTT 25       IMAGING:  Recent Results (from the past 24 hour(s))   XR Chest Port 1 View    Narrative    EXAM: XR CHEST PORT 1 VIEW  10/30/2021 5:04 AM     HISTORY:  Evaluate ET tube positioning       COMPARISON:  None    TECHNIQUE: Portable AP radiograph of the  chest.    FINDINGS:   Support devices: Endotracheal tube tip projects over the midthoracic  trachea. Enteric tube courses below the diaphragm and beyond the  field-of-view.    The trachea is midline. Heart size is within normal limits. Diffuse  mixed interstitial and airspace opacities bilaterally. No pleural  effusion or pneumothorax.    No osseous abnormality. No acute abnormality in the visualized upper  abdomen.      Impression    IMPRESSION:   1. Endotracheal tube tip projects over the midthoracic trachea.  2. Diffuse mixed interstitial and airspace opacities bilaterally  compatible with COVID pneumonia.     I have personally reviewed the examination and initial interpretation  and I agree with the findings.    NAVEEN MARCANO MD         SYSTEM ID:  [id number]   XR Abdomen Port 1 View    Narrative    EXAMINATION:  XR ABDOMEN PORT 1 VIEWS 10/30/2021 5:04 AM     COMPARISON: Same-day chest radiograph    HISTORY: Evaluate OG tube    TECHNIQUE: Frontal views of the abdomen.    FINDINGS:     Devices: Enteric tube tip projects over the distal stomach.     No abnormally dilated loops of bowel. No pneumatosis or portal venous  gas.    Mixed interstitial and airspace opacities in the visualized lungs.      Impression    IMPRESSION:   1. Enteric tube tip projects over the distal stomach.  2. Nonobstructive bowel gas pattern.  3. Mixed interstitial and airspace opacities in the visualized lungs.  Please see separate chest radiograph.    I have personally reviewed the examination and initial interpretation  and I agree with the findings.    NAVEEN MARCANO MD         SYSTEM ID:  [id number]

## 2021-10-31 NOTE — PLAN OF CARE
ICU End of Shift Summary. See flowsheets for vital signs and detailed assessment.    Changes this shift:     Sedated on propofol, versed, and dilauded RASS -5 BIS 24-32. Paralyzed with Vec TOF 4/4 and slightly overbreathing vent this afternoon, titrated up. Now compliant with vent and TOF 2/4 twitches. Tmax 101.7, tylenol given this evening. New blood cultures drawn today. Blood cultures from yesterday resulted with gram + cocci, MD notified, vanco initiated. 50g albumin given. Levo titrated down (highest 0.12, now at 0.02). Vent setting adjusted by NP, RR to 30 and TV to 360. Pt remained proned all day. Head turns q4 due to RT and nursing staffing. Repeat ABGs drawn. P:f 115 this evening.  Currently on 80% Fi02 PEEP of 14. Full-strength velitri continues.     Started insulin gtt and titrated up to alg 4+2. No BM, bowel reg maintained. Advanced tube feeds to goal of 45. Urine has a large amount of sediment that occasionally clogs tube. Sent urine sample which dermined pre-renal TOI. UO adequate.     Updated sister x1 and family facetimed with patient.     Plan: Consider post-pyloric access when supine. Continue with plan of care. Monitor and notify team of any changes.         Problem: Adult Inpatient Plan of Care  Goal: Plan of Care Review  Outcome: No Change  Goal: Patient-Specific Goal (Individualized)  Outcome: No Change  Goal: Absence of Hospital-Acquired Illness or Injury  Outcome: No Change  Intervention: Identify and Manage Fall Risk  Recent Flowsheet Documentation  Taken 10/31/2021 1600 by Elizabeth Sun, RN  Safety Promotion/Fall Prevention:   clutter free environment maintained   fall prevention program maintained   increased rounding and observation   increase visualization of patient   lighting adjusted   room near nurse's station   room organization consistent   safety round/check completed   treat reversible contributory factors   treat underlying cause  Taken 10/31/2021 1200 by Elizabeth Sun,  RN  Safety Promotion/Fall Prevention:   clutter free environment maintained   fall prevention program maintained   increased rounding and observation   increase visualization of patient   lighting adjusted   room near nurse's station   room organization consistent   safety round/check completed   treat reversible contributory factors   treat underlying cause  Taken 10/31/2021 0800 by Elizabeth Sun RN  Safety Promotion/Fall Prevention:   clutter free environment maintained   fall prevention program maintained   increased rounding and observation   increase visualization of patient   lighting adjusted   room near nurse's station   room organization consistent   safety round/check completed   treat reversible contributory factors   treat underlying cause  Intervention: Prevent Skin Injury  Recent Flowsheet Documentation  Taken 10/31/2021 1600 by Elizabeth Sun RN  Body Position:   head repositioned, right   tilted   left   lower extremity elevated   upper extremity elevated   prone  Taken 10/31/2021 1500 by Elizabeth Sun RN  Body Position: (microturn) other (see comments)  Taken 10/31/2021 1400 by Elizabeth Sun RN  Body Position: (microturn) other (see comments)  Taken 10/31/2021 1300 by Elizabeth Sun RN  Body Position: (microturn) other (see comments)  Taken 10/31/2021 1200 by Elizabeth Sun RN  Body Position:   head repositioned, left   tilted   right   lower extremity elevated   upper extremity elevated   prone  Taken 10/31/2021 1100 by Elizabeth Sun RN  Body Position: (microturn) other (see comments)  Taken 10/31/2021 1000 by Elizabeth Sun RN  Body Position: (microturn) other (see comments)  Taken 10/31/2021 0830 by Elizabeth Sun RN  Body Position: (RT only available for q3 turns - staffing)   head repositioned, right   tilted   left   lower extremity elevated   upper extremity elevated   prone  Taken 10/31/2021 0800 by Elizabeth Sun RN  Body Position: (microturn) prone  Intervention:  Prevent and Manage VTE (Venous Thromboembolism) Risk  Recent Flowsheet Documentation  Taken 10/31/2021 1600 by Elizabeth Sun RN  VTE Prevention/Management:   pneumatic compression device   anticoagulant therapy maintained   bleeding precautions maintained   bleeding risk assessed   bleeding risk factor(s) identified, physician notified   PROM (passive range of motion) performed  Taken 10/31/2021 1200 by Elizabeth Sun RN  VTE Prevention/Management:   pneumatic compression device   anticoagulant therapy maintained   bleeding precautions maintained   bleeding risk assessed   bleeding risk factor(s) identified, physician notified   PROM (passive range of motion) performed  Taken 10/31/2021 0800 by Elizabeth Sun RN  VTE Prevention/Management:   pneumatic compression device   anticoagulant therapy maintained   bleeding precautions maintained   bleeding risk assessed   bleeding risk factor(s) identified, physician notified   PROM (passive range of motion) performed  Intervention: Prevent Infection  Recent Flowsheet Documentation  Taken 10/31/2021 1600 by Elizabeth Sun RN  Infection Prevention:   environmental surveillance performed   equipment surfaces disinfected   hand hygiene promoted   rest/sleep promoted   single patient room provided   personal protective equipment utilized   visitors restricted/screened  Taken 10/31/2021 1200 by Elizabeth Sun RN  Infection Prevention:   environmental surveillance performed   equipment surfaces disinfected   hand hygiene promoted   rest/sleep promoted   single patient room provided   personal protective equipment utilized   visitors restricted/screened  Taken 10/31/2021 0800 by Elizabeth Sun RN  Infection Prevention:   environmental surveillance performed   equipment surfaces disinfected   hand hygiene promoted   rest/sleep promoted   single patient room provided   personal protective equipment utilized   visitors restricted/screened  Goal: Optimal Comfort and  Wellbeing  Outcome: No Change  Goal: Readiness for Transition of Care  Outcome: No Change     Problem: Risk for Delirium  Goal: Optimal Coping  Outcome: No Change  Goal: Improved Behavioral Control  Outcome: No Change  Goal: Improved Attention and Thought Clarity  Outcome: No Change  Goal: Improved Sleep  Outcome: No Change     Problem: ARDS (Acute Respiratory Distress Syndrome)  Goal: Effective Oxygenation  Outcome: No Change

## 2021-10-31 NOTE — PROGRESS NOTES
MEDICAL ICU Progress Note  10/31/2021    Date of Hospital Admission: 10/23/21  Date of ICU Admission: 10/30/21  Reason for Critical Care Admission: Acute Hypoxic Respiratory failure due to Covid PNA  Date of Service (when I saw the patient): 10/31/2021    ASSESSMENT: Trinidad Gillette is a 31 year old female with PMH diabetes, bipolar disorder, obesity and drug abuse who was admitted on 10/26/21 to [facility] for covid PNA. She was intubated on 10/29/21 and transferred to the [facility] MICU for further management.     CHANGES and MAJOR THINGS TODAY:   Add insulin drip  Start vanc/zosyn  Check a FENA - Give IVF for pre-renal  Increase TV and RR  Remain prone today    PLAN:    Neuro:  # Pain and sedation  -Midazolam gtt + prn  -Propofol gtt  -Dilaudid gtt + prn  -PTA gabapentin 800 mg TID    # Chemical paralysis for vent synchrony  - Vecuronium drip  - Goal to be synchronous with the vent  - Follow BIS monitor for adequate sedation while paralyzed    # Retinal Detachment L eye  -Patient is blind in L eye and pupil is nonreactive to light    Pulmonary:  # Acute Hypoxic Respiratory Failure due to Covid PNA  # ARDS  Symptom onset 10/21. Positive test on 10/22. Hospitalized on 10/26. Intubated on 10/29.  Ventilation Mode: CMV/AC  (Continuous Mandatory Ventilation/ Assist Control)  FiO2 (%): 85 %  Rate Set (breaths/minute): 30 breaths/min  Tidal Volume Set (mL): 380 mL  PEEP (cm H2O): 14 cmH2O  Oxygen Concentration (%): 70 %  Resp: (!) 31  -Dexamethasone 6mg x 10d (first dose on 10/27)  -Tocilizumab 10/27  -Duoneb q4h PRN  -Full strength veletri  -Remain prone today    Cardiovascular:  # Concern for septic shock with hypotension  Had elevated lactic acid on admission, resolved with fluid.  Hypotension could be related to sedation medications, sepsis and hypovolemia  - Norepi drip to keep MAP >65  - Continue to monitor    GI/Nutrition:  # Risk for malnutrition  # Morbid obesity  -PPI  -Bowel Regimen  -TF initiated  and tolerated, nutrition following    Renal/Fluids/Electrolytes:  # TOI  Unclear creatinine baseline.  - Creat 0.9 on admit here; now up to 2.49  - FENA 0.5 assumed pre-renal    50g 5% albumin  - Follow BMP  - Electrolyte replacement per ICU protocol    Endocrine:  # Poorly controlled T2DM  Hgb A1C 12.1 on admission  -Glargine 40u BID (1/2 home dose)  -Insulin drip   - Goal BG <180  -Hypoglycemic protocol    ID:  # Covid-19 PNA  -Decadron (10/27-  -Remdesivir deferred now that she is intubated  -Toci on 10/27  -Trend daily inflammatory markers x4 days    Cultures:    Blood culture 10/30 with GPC in 1 of 2 bottles, negative verigene    Sputum culture 10/30    MRSA nasal swab 10/30    Legionella 10/30    Streptococcus 10/30    Hematology:    # Coagulopathy 2/2 COVID-19  D-dimer on admission >20  - Enoxaparin 0.5mg/kg    Musculoskeletal:  No active concerns    Skin:  No active Concerns    General Cares/Prophylaxis:    DVT Prophylaxis: Enoxaparin (Lovenox) SQ  GI Prophylaxis: PPI  Restraints: None  Family Communication: Update sisterAlanis daily  Code Status: Full    Lines/tubes/drains:  - PIV  - PICC   - Arterial line    Disposition:  - Medical ICU     Patient seen and findings/plan discussed with medical ICU staff, Dr. Lopez.    Aliyah Rousseau NP-C      Attending note:  Patient seen, examined and discussed with the GLENDY. All data reviewed including vital signs, medications, laboratory studies and imaging.  I agree with the assessment and plan as outlined in the above note.  The patient remains critically ill with acute respiratory failure, diabetes mellitus and COVID19.  I personally adjusted the patient's ventilator to treat the acute respiratory failure, and followed hemodynamics with titration of vasopressors.  Severe acute respiratory failure and intermittent hypotension.  Will continue prone today and continue paralysis.  Fevers since yesterday with concern for secondary infection- will start broad spectrum  antibiotics, history of significant MRSA infection in the past.  Increasing creatinine- will check FeNa, trial of IVF.      Total Critical Care time excluding time for procedures and teaching was 40 minutes.     Mackenzie Lopez MD  [phone]     -----------------------------------------------------------------------  Overnight Events:  No acute events overnight.  Improved with proning therapy. Febrile and WBC increased.     PHYSICAL EXAMINATION:  Temp:  [98.6  F (37  C)-102.1  F (38.9  C)] 101.4  F (38.6  C)  Pulse:  [] 91  Resp:  [22-40] 26  BP: ()/(31-66) 118/55  MAP:  [54 mmHg-106 mmHg] 70 mmHg  Arterial Line BP: ()/(41-88) 131/48  FiO2 (%):  [85 %-100 %] 90 %  SpO2:  [88 %-100 %] 99 %     General: Intubated, sedated, paralyzed, prone in bed  HEENT: NCAT, L pupil irregular is nonreactive and dilated (chronic), ETT in place  Neuro: Sedated and paralyzed, no spontaneous movement noted  Pulm/Resp: Breath sounds clear and diminished bilaterally throughout  CV: RRR, no mrg  Abdomen: not assessed in prone position  : hart catheter in place, urine yellow and clear with sediment  Incisions/Skin: No noted skin breakdowns, skin graft on R thigh    LABS: Reviewed.   Arterial Blood Gases   Recent Labs   Lab 10/31/21  0440 10/30/21  1844 10/30/21  1606 10/30/21  0541   PH 7.23* 7.25* 7.21* 7.34*   PCO2 49* 47* 52* 41   PO2 132* 88 73* 146*   HCO3 20* 21 21 22     Complete Blood Count   Recent Labs   Lab 10/31/21  0440 10/30/21  0703   WBC 18.8* 8.3   HGB 10.1* 11.5*    320     Basic Metabolic Panel  Recent Labs   Lab 10/31/21  0331 10/31/21  0050 10/30/21  2339 10/30/21  1941 10/30/21  0746 10/30/21  0703   NA  --  141  --   --   --  141   POTASSIUM  --  4.7  --   --   --  5.0   CHLORIDE  --  114*  --   --   --  114*   CO2  --  20  --   --   --  20   BUN  --  36*  --   --   --  26   CR  --  2.15*  --   --   --  0.94   * 300* 312* 194*   < > 62*    < > = values in this interval not displayed.      Liver Function Tests  Recent Labs   Lab 10/30/21  0703   ALT 32   ALKPHOS 85   BILITOTAL 0.3   ALBUMIN 1.8*   INR 0.99     Coagulation Profile  Recent Labs   Lab 10/30/21  0703   INR 0.99   PTT 25       IMAGING:  No results found for this or any previous visit (from the past 24 hour(s)).

## 2021-10-31 NOTE — PLAN OF CARE
"  Problem: Adult Inpatient Plan of Care  Goal: Readiness for Transition of Care  Outcome: No Change     ICU End of Shift Summary. See flowsheets for vital signs and detailed assessment.    Changes this shift: Dilaudid 2mg/hr, Propofol 30mcg/kg/min, Versed 13mg/hr, with BIS in 20's; Vec 0.6mcg/kg/min, synchronous with vent. T-max 102.4F, Tylenol given. ST/SR 80s-low 100s, BPs labile, specifically with turns, pt requiring Levo @ 0.04-0.08 mcg/kg/min to meet MAP goal >65. AM Proned since 18:00 yesterday. Gas improved this AM on 100% Fio2~ P/F 132. Vent settings now CMV 90%/26/360/14. FS Flolan. PIPs and Plateaus 32-34. Small PO/ETT secretions-creamy & tan. NGT running TF @ 35ml/hr (goal 45). BGLs hyperglycemic (200-300), however Lantus regimen just recently initiated. No BM this shift. Rectal pouch applied. Inadequate UOP all evening/NOC; approximately 30mL/hr. MD notified. Bladder scanned beginning of shift for 49mL. 1L LR bolus subsequently given, with minimal response, however UA/UCx collected and remarkably +.  Cr spiked to 2.15, and GFR down to 30. WBCs increased to 18.8 (8.3), CRP 83 (7). Lactics downtrending after bolus' (1.4).     Plan: ABGs. Wean vent as able. Neph consult. DVT/PE r/o? Potentially transition to \"very high\" sliding scale.     "

## 2021-10-31 NOTE — PHARMACY-VANCOMYCIN DOSING SERVICE
"Pharmacy Vancomycin Initial Note  Date of Service 2021  Patient's  1990  31 year old, female    Indication: Sepsis    Current estimated CrCl = Estimated Creatinine Clearance: 41.8 mL/min (A) (based on SCr of 2.49 mg/dL (H)).    Creatinine for last 3 days  10/30/2021:  7:03 AM Creatinine 0.94 mg/dL  10/31/2021: 12:50 AM Creatinine 2.15 mg/dL;  9:12 AM Creatinine 2.49 mg/dL    Recent Vancomycin Level(s) for last 3 days  No results found for requested labs within last 72 hours.      Vancomycin IV Administrations (past 72 hours)                   vancomycin (VANCOCIN) 2,500 mg in sodium chloride 0.9 % 250 mL intermittent infusion (mg) 2,500 mg New Bag 10/31/21 0930                Nephrotoxins and other renal medications (From now, onward)    Start     Dose/Rate Route Frequency Ordered Stop    10/31/21 1012  vancomycin place whyte - receiving intermittent dosing      1 each Intravenous SEE ADMIN INSTRUCTIONS 10/31/21 1013      10/31/21 0800  piperacillin-tazobactam (ZOSYN) 4.5 g vial to attach to  mL bag     Note to Pharmacy: For [facility], [facility] and [facility]: For Zosyn-naive patients, use the \"Zosyn initial dose + extended infusion\" order panel.    4.5 g  over 30 Minutes Intravenous EVERY 6 HOURS 10/31/21 0718      10/31/21 0800  vancomycin (VANCOCIN) 2,500 mg in sodium chloride 0.9 % 250 mL intermittent infusion      2,500 mg (central catheter)  over 90 Minutes Intravenous ONCE 10/31/21 0735      10/30/21 1100  norepinephrine (LEVOPHED) 16 mg in  mL infusion MAX CONC CENTRAL LINE      0.01-0.6 mcg/kg/min × 114.8 kg (Dosing Weight)  1.1-64.6 mL/hr  Intravenous CONTINUOUS 10/30/21 1039            Contrast Orders - past 72 hours (72h ago, onward)    None              Plan:  1. Start vancomycin  2500 mg IV x 1 loading dose, given continued worsening renal function will dose by level for now.   2. Vancomycin monitoring method: Trough (Method 2 = manual dose calculation)  3. Vancomycin therapeutic " monitoring goal: 15-20 mg/L  4. Pharmacy will check vancomycin levels as appropriate in 1-3 Days.    5. Serum creatinine levels will be ordered daily for the first week of therapy and at least twice weekly for subsequent weeks.      Grace Steiner, AshelyD

## 2021-11-01 PROBLEM — N17.9 ACUTE KIDNEY FAILURE, UNSPECIFIED (H): Status: ACTIVE | Noted: 2021-01-01

## 2021-11-01 NOTE — CONSULTS
[facility]  Palliative Care Consultation Note    Patient: Trinidad Gillette  Date of Admission:  10/30/2021    Requesting Clinician / Team: MICU  Reason for consult: Goals of care, Decisional support, Patient and family support    Recommendations:    Initial conversation with sister Alanis re: prognosis then follow up phone call with MICU CNP Ceci Sibley. Revisited prognosis and plan of care (outlined below).    Recommendation made to change code status to DNAR and sister agreed.    Labs were stable so plan at this time is to continue current care plan and reattempt supination tomorrow. Patient will likely need to be supinated for a minimum of 30 mins if CRRT is required (desatted to 30s after 7 mins today). Sister Alanis at this time does feel that patient would want to attempt dialysis if it was an option.    Palliative care  has prayed with patient per sister's request.     Our team remains available for ongoing support.     These recommendations have been discussed with Ceci Sibley CNP.      Thank you for the opportunity to participate in the care of this patient and family. Our team: will continue to follow.     During regular M-F work hours -- if you are not sure who specifically to contact -- please contact us by sending a text page to our team consult pager at [phone].    After regular work hours and on weekends/holidays, you can call our answering service at [phone]. Also, who's on call for us is available in [facility] Smart Web.       Assessments:  Trinidad Gillette is a 31 year old female with a past medical history significant for diabetes, bipolar disorder, obesity and drug abuse who was admitted on 10/26/21 to [facility] for covid PNA. She was intubated on 10/29/21 and transferred to the [facility] MICU for further management. She has been proned since 10/30, and attempts to supinate her today resulted in desats and emergently re-proning.  "She additionally has developed TOI and may need to start CRRT. Palliative care consulted for family and decisional support in complicated medical situation.     Today, the patient was seen for:  COVID ARDS  TOI  Goals of care  Family Support    Prognosis, Goals, & Planning:      Functional Status just prior to hospitalization: 1 (Restricted in physically strenuous activity but ambulatory and able to carry out work of a light or sedentary nature)      Prognosis, Goals, and/or Advance Care Planning were addressed today: Yes. Initial conversation with sister Alanis between myself, Resident MD Dr. Rojo, and PC chicho Mckenzie. Alanis shared that patient has struggled since the death of her spouse 4 years ago and has turned at times to chemical coping. She states that patient has said on numerous occasions that she wishes to join her  and had candidly told Alanis when she was admitted to the hospital that she thought she \"wasn't getting out this time\". Alanis stated that she knows time is likely getting shorter and she is being encouraged from other family members to \"let Trinidad go\". She reflects on the close relationship she's had with her sister and how this is making her \"just want to be selfish\". We then had a follow up conversation including Ceci Sibley CNP. As labs were stable, can hold off on dialysis for today and attempt to supinate tomorrow. We discussed that with Trinidad's multiple medical issues, if she were to have a cardiac arrest, there is a very low likelihood of CPR being successful. Based on this, we recommended changing her code status to DNAR. Alanis acknowledged this was in alignment with her sister's wishes and agreed. With regard to dialysis, Alanis stated that she and Trinidad had discussed this in the past and at that time, Trinidad had said she'd be open to a trial of dialysis. Based on this, Alanis thinks she'd like Trinidad to start dialysis if that is indicated. She understands " that Trinidad's respiratory status would be a limiting factor.       Patient's decision making preferences: unable to assess          Patient has decision-making capacity today for complex decisions: No            I have concerns about the patient/family's health literacy today: No           Patient has a completed Health Care Directive: Unknown or unable to assess.      Code status: No CPR / No Intubation    Coping, Meaning, & Spirituality:   Mood, coping, and/or meaning in the context of serious illness were addressed today: Yes  Per Alanis, patient was raised [facility] and though they haven't been attending Hinduism since living in [place], her Mosque artemio has remained important to her. Alanis appreciated  support to pray with her sister.    Social:     Key family / caregivers: Sister, a few other family members    Substance use history: Per sister, patient has struggled with substance use since the sudden death of her  4 years ago.     Patient is .    History of Present Illness:  History gathered today from: family/loved ones, medical chart, medical team members    Diagnosed COVID positive on 10/22, then admitted to OSH on 10/26. Received steroids (unclear dosing) and Tocilizumab. By 10/29, she was intubated and then transferred to [facility]. Since arrival, she has been proned, as attempts to turn her supine resulted in her sats dropping and becoming unstable. Additionally, her creatinine has been trending up, and there is concern she will soon need CRRT. However, a limiting factor is patient's ability to be supine.    Spoke with her sister Alanis by phone. She is aware that patient is very sick and that there may not be a recoverable outcome for her. She openly discussed how patient has said for many years that she wishes to reunite with her  who  suddenly 4 years ago.     Key Palliative Symptom Data:  We are not helping to manage these symptoms currently in this  patient.    ROS:  Besides above, a complete 10+ ROS was unable to be obtained due to patient's mental status.     Past Medical History:  No past medical history on file.     Past Surgical History:  Past Surgical History:   Procedure Laterality Date     PICC DOUBLE LUMEN PLACEMENT Right 10/30/2021    right basilic 44 cm         Family History:  No family history on file.      Allergies:  Allergies   Allergen Reactions     Vancomycin Nephrotoxicity     Causing acute renal failure, per [facility] Documentation     Tramadol GI Disturbance     Per [facility] Documentation     Cefaclor Rash     Per [facility] Documentation     Hydrocodone-Acetaminophen Rash     Per [facility] Documentation        Medications:  I have reviewed this patient's medication profile and medications from this hospitalization.   Noted scheduled meds are:  Decadron 6mg IV daily.  Albuterol nebs  Lovenox  Pantoprazole  Zosyn  Senna  Miralax  Vancomycin      Physical Exam:  Vital Signs: Temp: 97.8  F (36.6  C) Temp src: Axillary   Pulse: 84   Resp: 30 SpO2: 99 % O2 Device: Mechanical Ventilator    Weight: 254 lbs 3.05 oz  Gen: Ill-appearing, proned.  HENT: No head trauma noted, intubated.  Msk: no gross deformity  Skin:  No jaundice, no breakdown noted on visible skin.  Neuro: Sedated.    Data reviewed:  Reviewed recent labs and pertinent imaging  11/1/21- Cr 3.42 (trending up, increased more that a point over last day). GFR 17. K+ 5.   10/30/21- AXR IMPRESSION:   1. Enteric tube tip projects over the distal stomach.  2. Nonobstructive bowel gas pattern.  3. Mixed interstitial and airspace opacities in the visualized lungs.    10/30/31 CXR - IMPRESSION:   1. Endotracheal tube tip projects over the midthoracic trachea.  2. Diffuse mixed interstitial and airspace opacities bilaterally compatible with COVID pneumonia.    Thank you for the opportunity to continue to participate in the  care of this patient and family.  Please feel free to contact on-call palliative provider with any emergent needs.  We can be reached via team pager [phone] (answered 8-4:30 Monday-Friday); after-hours answering service ([phone]);     Erica Iqbal DO / Palliative Medicine / Pager [phone] / [facility] Inpatient Team Consult Pager [phone] (answered 8am-430pm M-F) - ok to text page via [employer] / After-Hours Answering Service [phone] / Palliative Clinic in the [facility] [place] [facility] [place] - [phone] (scheduling); [phone] (triage).    60 mins spent in reviewing record, patient examination, discussion with MICU team,  >50% spent in goals of care conversations with sister including discussion on code status and dialysis, and documentation.

## 2021-11-01 NOTE — PROGRESS NOTES
SPIRITUAL HEALTH SERVICES  SPIRITUAL ASSESSMENT Progress Note (Palliative Focus)  [facility] 4C    REFERRAL SOURCE: Staff referral/care conference.    Telephone conversation with patient Trinidad Gillette's sister Alanis, MICU MESSI Sibley, Palliative Care MD Iqbal, and resident MD Rojo.    Team discussed prognosis and explored goals of care, and sister Alanis shared Trinidad's request to be DNR. She also noted that Trinidad has been open to the idea of dialysis, if needed, in the past, and that presently, Alanis believes that Trinidad would want us to try dialysis if needed.    Family are [place] from out of state, and Alanis is appreciative of their support. She is deeply distressed by the challenges Trinidad has faced in terms of physical and emotional pain and health challenges, and she shared her own grief as she considers the possibility that Trinidad might die. Alanis remains appreciative of emotional/spiritual support.    Plan: I will follow for spiritual support while Palliative Care is consulted.    Ceci Mckenzie  Palliative   Pager [phone]  [facility] Inpatient Team Consult pager [phone] (M-F 8-4:30)  After-hours Answering Service [phone]

## 2021-11-01 NOTE — CONSULTS
Nephrology Initial Consult  November 1, 2021      Trinidad Gillette MRN:[id number] YOB: 1990  Date of Admission:10/30/2021  Primary care provider: No primary care provider on file.  Requesting physician: Maddison Whitman*    ASSESSMENT AND RECOMMENDATIONS:   TOI-Baseline Cr normal at 0.9, has long hx of UTI's, renal US ordered.  Cr up sharply in the past 48h in setting of sepsis with fevers, likely high inflammatory state.  The risk of placing line is higher than usual due to her intolerance of even short time supine this am, currently proned.  We are taking the approach of checking labs q6h and would offer CRRT if K is high, eventually may be forced to consider for volume.  I did call and have long conversation with Pt's sister Caroline and she is discussing with other family members whether Mrs Gillette would want RRT as part of her care.     -TOI, likely hypoperfusion and inflammation from COVID sepsis.    -No HD access currently.     -Would offer CRRT if indication arises although it is not clear if she would want this, family is discussing.    -Would use Oxirus filter if starting on CRRT as she does have a cytokine storm picture, IL-6 ordered although this will take several days to result.      Volume-Volume expanding, did give lasix this am with the intent of excreting more K, UOP did not change significantly but can repeat to continue excreting K.      Electrolytes/pH-K 5.0 on last check, Bicarb is 20, does have some room for bicarb administration if K is up again on next check.  Last ABG 7.22/48/141/20, having some difficulty ventilating.  Chloride is rising due to the NS in our drips. This will drive a non AG acidosis so monitor  - switch IVF as able or plan for bicarbonate boluses. However, the easiest way to maintain will be to put baking soda down her NG. This is ordered via nutrition. I would start with 0.5 tsp twice daily.     BMD-Ca 7.3, Mg and Phos reasonable.      COVID-Unvaccinated  (Not listed in vaccinations in outpt record from 10/11/2021), on dexamethasone, received tocilizumab x2 days.  On enoxaparin for DVT prophylaxis.  Given her weight, and her kidney dysfunction, dosing here is challenging. Be cautious.    Anemia-Hgb 8.0, may be dilutional from ~11 yesterday with volume expansion, acute management per team.      Seen and discussed with Dr Ramey    Recommendations were communicated to primary team via verbal communication.     FRIEDA Agarwal CNS  Clinical Nurse Specialist  [phone]  Attestation:  This patient has been seen, examined and evaluated by me, Sandra Ramey MD as a shared visit with the NP/PA above.     In brief:  Trinidad Gillette is a 31 year old female with covid, TOI not on dialysis.     I personally reviewed major complaints, physical findings, investigations, medications, lab values, vital signs, I/O's and the overall management plan.      My key findings and Decisions are noted in bold above. NOTE: Due to active Covid status, no exam was completed.       Sandra Ramey MD MS  Date of service (date I saw patient) 1 Nov 2021    REASON FOR CONSULT: Requested to evaluate 31 yof for management of TOI and possible RRT in setting of COVID.      HISTORY OF PRESENT ILLNESS:  Trinidad Gillette is a 31 yof with hx of DM2, bipolar disorder, Obesity and Polysubstance abuse who is admitted with severe COVID, intubated on 10/30 and very unstable requiring proning.  Nephrology consulted for rapidly rising Cr and increasing K.      PAST MEDICAL HISTORY:  DM2  Bipolar  Obesity  Polysubstance abuse      Past Surgical History:   Procedure Laterality Date     PICC DOUBLE LUMEN PLACEMENT Right 10/30/2021    right basilic 44 cm        MEDICATIONS:  PTA Meds  Prior to Admission medications    Not on File      Current Meds    albuterol  2.5 mg Nebulization Q4H     dexamethasone  6 mg Intravenous Daily     enoxaparin ANTICOAGULANT  0.5 mg/kg (Dosing Weight) Subcutaneous BID      furosemide  120 mg Intravenous Once     [Held by provider] gabapentin  300 mg Oral Q8H ANJELICA     LubriFresh P.M.  1 Application Both Eyes Q8H     multivitamins w/minerals  15 mL Per Feeding Tube Daily     pantoprazole  40 mg Per Feeding Tube QAM AC     piperacillin-tazobactam  3.375 g Intravenous Q6H     polyethylene glycol  17 g Oral Daily     protein modular  2 packet Per Feeding Tube BID     senna-docusate  1 tablet Oral BID    Or     senna-docusate  2 tablet Oral BID     vancomycin place whyte - receiving intermittent dosing  1 each Intravenous See Admin Instructions     Infusion Meds    dextrose       dextrose       epoprostenol (VELETRI) 20 mcg/mL in sterile water inhalation solution 20 ng/kg/min (11/01/21 0631)     HYDROmorphone 2 mg/hr (11/01/21 0900)     insulin regular 17 Units/hr (11/01/21 1000)     midazolam 13 mg/hr (11/01/21 0808)     norepinephrine 0.02 mcg/kg/min (11/01/21 1020)     propofol (DIPRIVAN) infusion Stopped (11/01/21 0911)     vecuronium (NORCURON) infusion ADULT 1 mcg/kg/min (11/01/21 0700)       ALLERGIES:    Allergies   Allergen Reactions     Vancomycin Nephrotoxicity     Causing acute renal failure, per [facility] Documentation     Tramadol GI Disturbance     Per [facility] Documentation     Cefaclor Rash     Per [facility] Documentation     Hydrocodone-Acetaminophen Rash     Per [facility] Documentation       REVIEW OF SYSTEMS:  Intubated and sedated so unable to determine    SOCIAL HISTORY:   Social History     Socioeconomic History     Marital status: Single     Spouse name: Not on file     Number of children: Not on file     Years of education: Not on file     Highest education level: Not on file   Occupational History     Not on file   Tobacco Use     Smoking status: Not on file   Substance and Sexual Activity     Alcohol use: Not on file     Drug use: Not on file     Sexual activity: Not on file   Other Topics  "Concern     Not on file   Social History Narrative     Not on file     Social Determinants of Health     Financial Resource Strain:      Difficulty of Paying Living Expenses:    Food Insecurity:      Worried About Running Out of Food in the Last Year:      Ran Out of Food in the Last Year:    Transportation Needs:      Lack of Transportation (Medical):      Lack of Transportation (Non-Medical):    Physical Activity:      Days of Exercise per Week:      Minutes of Exercise per Session:    Stress:      Feeling of Stress :    Social Connections:      Frequency of Communication with Friends and Family:      Frequency of Social Gatherings with Friends and Family:      Attends Congregational Services:      Active Member of Clubs or Organizations:      Attends Club or Organization Meetings:      Marital Status:    Intimate Partner Violence:      Fear of Current or Ex-Partner:      Emotionally Abused:      Physically Abused:      Sexually Abused:      Reviewed with patient's family, noncontributory social hx.      FAMILY MEDICAL HISTORY:   No family history on file.  Reviewed with patient's family, has hx of recurrent UTI's but no family hx of kidney failure or dialysis.      PHYSICAL EXAM:   Temp  Av.7  F (38.2  C)  Min: 98.3  F (36.8  C)  Max: 102.9  F (39.4  C)  Arterial Line BP  Min: 87/44  Max: 154/51  Arterial Line MAP (mmHg)  Av.5 mmHg  Min: 54 mmHg  Max: 106 mmHg      Pulse  Av.6  Min: 73  Max: 114 Resp  Av.6  Min: 18  Max: 40  FiO2 (%)  Av.1 %  Min: 70 %  Max: 100 %  SpO2  Av.5 %  Min: 84 %  Max: 100 %       /60 (BP Location: Left arm)   Pulse 86   Temp 98.3  F (36.8  C) (Axillary)   Resp 30   Ht 1.676 m (5' 6\")   Wt 115.3 kg (254 lb 3.1 oz)   SpO2 95%   BMI 41.03 kg/m     Date 21 0700 - 21 0659   Shift 7732-4737 2808-1301 1095-6283 24 Hour Total   INTAKE   I.V. 424.92   424.92   NG/   185   IV Piggyback 500   500   Enteral 180   180   Shift Total(mL/kg) " 1289.92(11.19)   1289.92(11.19)   OUTPUT   Urine 110   110   Stool 0   0   Shift Total(mL/kg) 110(0.95)   110(0.95)   Weight (kg) 115.3 115.3 115.3 115.3      Admit Weight: 115.3 kg (254 lb 3.1 oz)     PE not done due to +COVID status but seen from hallway through door and discussed PE with primary RN and team.      LABS:   CMP  Recent Labs   Lab 11/01/21  0959 11/01/21  0854 11/01/21  0801 11/01/21  0656 11/01/21  0504 11/01/21  0422 10/31/21  2159 10/31/21  2157 10/31/21  0938 10/31/21  0912 10/31/21  0331 10/31/21  0050 10/30/21  0746 10/30/21  0703   NA  --   --   --   --   --  140  --  142  --  140  --  141   < > 141   POTASSIUM  --   --   --   --   --  5.1  --  4.8  --   --   --  4.7  --  5.0   CHLORIDE  --   --   --   --   --  113*  --  116*  --   --   --  114*  --  114*   CO2  --   --   --   --   --  20  --  20  --   --   --  20  --  20   ANIONGAP  --   --   --   --   --  7  --  6  --   --   --  7  --  7   * 201* 179* 213*   < > 270*   < > 200*   < >  --    < > 300*   < > 62*   BUN  --   --   --   --   --  49*  --  47*  --   --   --  36*  --  26   CR  --   --   --   --   --  3.42*  --  3.08*  --  2.49*  --  2.15*   < > 0.94   GFRESTIMATED  --   --   --   --   --  17*  --  19*  --   --   --  30*  --  81   ARMAAN  --   --   --   --   --  7.2*  --  7.4*  --   --   --  7.3*  --  8.0*   MAG  --   --   --   --   --  2.3  --  2.4*  --   --   --  2.3  --  2.6*   PHOS  --   --   --   --   --  4.6*  --  3.4  --   --   --  5.2*  --  5.4*   PROTTOTAL  --   --   --   --   --   --   --   --   --   --   --   --   --  7.1   ALBUMIN  --   --   --   --   --   --   --   --   --   --   --   --   --  1.8*   BILITOTAL  --   --   --   --   --   --   --   --   --   --   --   --   --  0.3   ALKPHOS  --   --   --   --   --   --   --   --   --   --   --   --   --  85   ALT  --   --   --   --   --   --   --   --   --   --   --   --   --  32    < > = values in this interval not displayed.     CBC  Recent Labs   Lab 11/01/21  7138  10/31/21  0440 10/30/21  0703   HGB 8.0* 10.1* 11.5*   WBC 12.1* 18.8* 8.3   RBC 2.80* 3.44* 3.93   HCT 25.7* 31.5* 36.2   MCV 92 92 92   MCH 28.6 29.4 29.3   MCHC 31.1* 32.1 31.8   RDW 14.1 13.8 13.7    419 320     INR  Recent Labs   Lab 10/30/21  0703   INR 0.99   PTT 25     ABG  Recent Labs   Lab 11/01/21  0423 11/01/21  0023 10/31/21  1629 10/31/21  0913   PH 7.22* 7.25* 7.24* 7.25*   PCO2 48* 45 43 44   PO2 141* 76* 92 71*   HCO3 20* 20* 19* 19*   O2PER 85 80 70 70      URINE STUDIES  Recent Labs   Lab Test 10/30/21  1843   COLOR Yellow   APPEARANCE Cloudy*   URINEGLC Negative   URINEBILI Negative   URINEKETONE Negative   SG 1.027   UBLD Negative   URINEPH 5.5   PROTEIN 200 *   NITRITE Negative   LEUKEST Trace*   RBCU 4*   WBCU 10*     No lab results found.  PTH  No lab results found.  IRON STUDIES  No lab results found.

## 2021-11-01 NOTE — PLAN OF CARE
ICU End of Shift Summary. See flowsheets for vital signs and detailed assessment.    Changes this shift: Supinated at 0900. Pt did not tolerate - desat to 30's and hypotensive; immediately re-proned. Trending ABG, BMP. WOC consulted.     Plan: Continue with plan of care.     Problem: Adult Inpatient Plan of Care  Goal: Plan of Care Review  11/1/2021 1815 by Zane Zavaleta RN  Outcome: No Change  11/1/2021 1815 by Zane Zavaleta RN  Outcome: No Change  Goal: Patient-Specific Goal (Individualized)  11/1/2021 1815 by Zane Zavaleta RN  Outcome: No Change  11/1/2021 1815 by Zane Zavaleta RN  Outcome: No Change  Goal: Absence of Hospital-Acquired Illness or Injury  11/1/2021 1815 by Zane Zavaleta RN  Outcome: No Change  11/1/2021 1815 by Zane Zavaleta RN  Outcome: No Change  Intervention: Identify and Manage Fall Risk  Recent Flowsheet Documentation  Taken 11/1/2021 1600 by Zane Zavaleta RN  Safety Promotion/Fall Prevention:   activity supervised   bed alarm on   clutter free environment maintained   fall prevention program maintained   increased rounding and observation   increase visualization of patient   lighting adjusted   mobility aid in reach   patient and family education   room near nurse's station   room organization consistent  Taken 11/1/2021 1200 by Zane Zavaleta RN  Safety Promotion/Fall Prevention:   activity supervised   bed alarm on   clutter free environment maintained   fall prevention program maintained   increased rounding and observation   increase visualization of patient   lighting adjusted   mobility aid in reach   patient and family education   room near nurse's station   room organization consistent  Taken 11/1/2021 0800 by Zane Zavaleta RN  Safety Promotion/Fall Prevention:   activity supervised   bed alarm on   clutter free environment maintained   fall prevention program maintained   increased rounding and observation   increase visualization of patient   lighting adjusted    mobility aid in reach   patient and family education   room near nurse's station   room organization consistent  Intervention: Prevent Skin Injury  Recent Flowsheet Documentation  Taken 11/1/2021 1800 by Zane Zavaleta RN  Body Position:   prone   legs elevated   weight shifting   left   head repositioned, left  Taken 11/1/2021 1600 by Zane Zavaleta RN  Body Position:   prone   legs elevated   weight shifting   right   head repositioned, right  Taken 11/1/2021 1400 by Zane Zavaleta RN  Body Position:   prone   legs elevated   weight shifting   right  Taken 11/1/2021 1200 by Zane Zavaleta RN  Body Position:   prone   left   legs elevated   weight shifting  Taken 11/1/2021 1000 by Zane Zavaleta RN  Body Position:   prone   legs elevated   weight shifting   head repositioned, right   right  Taken 11/1/2021 0945 by Zane Zavaleta RN  Body Position: (Pt unable to tolerate ) supine  Taken 11/1/2021 0800 by Zane Zavaleta RN  Body Position:   prone   left   legs elevated   weight shifting  Intervention: Prevent and Manage VTE (Venous Thromboembolism) Risk  Recent Flowsheet Documentation  Taken 11/1/2021 1600 by Zane Zavaleta RN  VTE Prevention/Management:   pneumatic compression device   anticoagulant therapy maintained   PROM (passive range of motion) performed  Taken 11/1/2021 1200 by Zane Zavaleta RN  VTE Prevention/Management:   pneumatic compression device   anticoagulant therapy maintained   PROM (passive range of motion) performed  Taken 11/1/2021 0800 by Zane Zavaleta RN  VTE Prevention/Management:   pneumatic compression device   anticoagulant therapy maintained   PROM (passive range of motion) performed  Intervention: Prevent Infection  Recent Flowsheet Documentation  Taken 11/1/2021 1600 by Zane Zavaleta RN  Infection Prevention:   visitors restricted/screened   single patient room provided   rest/sleep promoted   personal protective equipment utilized   hand hygiene promoted   equipment  surfaces disinfected   environmental surveillance performed   cohorting utilized  Taken 11/1/2021 1200 by Zane Zavaleta RN  Infection Prevention:   visitors restricted/screened   single patient room provided   rest/sleep promoted   personal protective equipment utilized   hand hygiene promoted   equipment surfaces disinfected   environmental surveillance performed   cohorting utilized  Taken 11/1/2021 0800 by Zane Zavaleta RN  Infection Prevention:   visitors restricted/screened   single patient room provided   rest/sleep promoted   personal protective equipment utilized   hand hygiene promoted   equipment surfaces disinfected   environmental surveillance performed   cohorting utilized  Goal: Optimal Comfort and Wellbeing  11/1/2021 1815 by Zane Zavaleta RN  Outcome: No Change  11/1/2021 1815 by Zane Zavaleta RN  Outcome: No Change  Goal: Readiness for Transition of Care  11/1/2021 1815 by Zane Zavaleta RN  Outcome: No Change  11/1/2021 1815 by Zane Zavaleta RN  Outcome: No Change     Problem: Risk for Delirium  Goal: Optimal Coping  11/1/2021 1815 by Zane Zavaleta RN  Outcome: No Change  11/1/2021 1815 by Zane Zavaleta RN  Outcome: No Change  Intervention: Optimize Psychosocial Adjustment to Delirium  Recent Flowsheet Documentation  Taken 11/1/2021 1600 by Zane Zavaleta RN  Supportive Measures: relaxation techniques promoted  Taken 11/1/2021 1200 by Zane Zavaleta RN  Supportive Measures: relaxation techniques promoted  Taken 11/1/2021 0800 by Zane Zavaleta RN  Supportive Measures: relaxation techniques promoted  Goal: Improved Behavioral Control  11/1/2021 1815 by Zane Zavaleta RN  Outcome: No Change  11/1/2021 1815 by Zane Zavaleta RN  Outcome: No Change  Intervention: Prevent and Manage Agitation  Recent Flowsheet Documentation  Taken 11/1/2021 1600 by Zane Zavaleta RN  Environment Familiarity/Consistency: daily routine followed  Taken 11/1/2021 1200 by Zane Zavaleta RN  Environment  Familiarity/Consistency: daily routine followed  Taken 11/1/2021 0800 by Zane Zavaleta RN  Environment Familiarity/Consistency: daily routine followed  Goal: Improved Attention and Thought Clarity  11/1/2021 1815 by Zane Zavaleta RN  Outcome: No Change  11/1/2021 1815 by Zane Zavaleta RN  Outcome: No Change  Intervention: Maximize Cognitive Function  Recent Flowsheet Documentation  Taken 11/1/2021 1600 by Zane Zavaleta RN  Sensory Stimulation Regulation:   auditory stimulation minimized   lighting decreased  Reorientation Measures: reorientation provided  Taken 11/1/2021 1200 by Zane Zavaleta RN  Sensory Stimulation Regulation:   auditory stimulation minimized   lighting decreased  Reorientation Measures: reorientation provided  Taken 11/1/2021 0800 by Zane Zavaleta RN  Sensory Stimulation Regulation:   auditory stimulation minimized   lighting decreased  Reorientation Measures: reorientation provided  Goal: Improved Sleep  11/1/2021 1815 by Zane Zavaleta RN  Outcome: No Change  11/1/2021 1815 by Zane Zavaleta RN  Outcome: No Change  Intervention: Promote Sleep  Recent Flowsheet Documentation  Taken 11/1/2021 1600 by Zane Zavaleta RN  Sleep/Rest Enhancement:   room darkened   regular sleep/rest pattern promoted   noise level reduced   natural light exposure provided  Taken 11/1/2021 1200 by Zane Zavaleta RN  Sleep/Rest Enhancement:   room darkened   regular sleep/rest pattern promoted   noise level reduced   natural light exposure provided  Taken 11/1/2021 0800 by Zane Zavaleta RN  Sleep/Rest Enhancement:   room darkened   regular sleep/rest pattern promoted   noise level reduced   natural light exposure provided     Problem: ARDS (Acute Respiratory Distress Syndrome)  Goal: Effective Oxygenation  11/1/2021 1815 by Zane Zavaleta RN  Outcome: No Change  11/1/2021 1815 by Zane Zavaleta RN  Outcome: No Change  Intervention: Optimize Oxygenation, Ventilation and Perfusion  Recent Flowsheet  Documentation  Taken 11/1/2021 1600 by Zane Zavaleta RN  Lung Protection Measures:   lung compliance monitored   optimal PEEP applied   plateau/inspiratory pressure monitored   ventilator synchrony promoted   ventilator waveforms monitored  Airway/Ventilation Management:   airway patency maintained   pulmonary hygiene promoted  Taken 11/1/2021 1200 by Zane Zavaleta RN  Lung Protection Measures:   lung compliance monitored   optimal PEEP applied   plateau/inspiratory pressure monitored   ventilator synchrony promoted   ventilator waveforms monitored  Airway/Ventilation Management:   airway patency maintained   pulmonary hygiene promoted  Taken 11/1/2021 0800 by Zane Zavaleta RN  Lung Protection Measures:   lung compliance monitored   optimal PEEP applied   plateau/inspiratory pressure monitored   ventilator synchrony promoted   ventilator waveforms monitored  Airway/Ventilation Management:   airway patency maintained   pulmonary hygiene promoted     Problem: Gas Exchange Impaired  Goal: Optimal Gas Exchange  11/1/2021 1815 by Zane Zavaleta RN  Outcome: No Change  11/1/2021 1815 by Zane Zavaleta RN  Outcome: No Change  Intervention: Optimize Oxygenation and Ventilation  Recent Flowsheet Documentation  Taken 11/1/2021 1600 by Zane Zavaleta RN  Airway/Ventilation Management:   airway patency maintained   pulmonary hygiene promoted  Head of Bed (HOB) Positioning: (Reverse trend) other (see comments)  Taken 11/1/2021 1400 by Zane Zavaleta RN  Head of Bed (HOB) Positioning: (Reverse trend) other (see comments)  Taken 11/1/2021 1200 by Zane Zavaleta RN  Airway/Ventilation Management:   airway patency maintained   pulmonary hygiene promoted  Head of Bed (HOB) Positioning: (Reverse trend) other (see comments)  Taken 11/1/2021 1000 by Zane Zavaleta RN  Head of Bed (HOB) Positioning: other (see comments)  Taken 11/1/2021 0800 by Zane Zavaleta RN  Airway/Ventilation Management:   airway patency maintained    pulmonary hygiene promoted  Head of Bed (HOB) Positioning: (Reverse trend) other (see comments)

## 2021-11-01 NOTE — PHARMACY-VANCOMYCIN DOSING SERVICE
"Pharmacy Vancomycin Note  Date of Service 2021  Patient's  1990   31 year old, female    Indication: Sepsis  Day of Therapy: 2  Current vancomycin regimen:  Intermittent dosing  Current vancomycin monitoring method: Trough (Method 2 = manual dose calculation)  Current vancomycin therapeutic monitoring goal: 15-20 mg/L    Current estimated CrCl = Estimated Creatinine Clearance: 30.5 mL/min (A) (based on SCr of 3.42 mg/dL (H)).    Creatinine for last 3 days  10/30/2021:  7:03 AM Creatinine 0.94 mg/dL  10/31/2021: 12:50 AM Creatinine 2.15 mg/dL;  9:12 AM Creatinine 2.49 mg/dL;  9:57 PM Creatinine 3.08 mg/dL  2021:  4:22 AM Creatinine 3.42 mg/dL    Recent Vancomycin Levels (past 3 days)  2021:  4:22 AM Vancomycin 21.4 mg/L    Vancomycin IV Administrations (past 72 hours)                   vancomycin (VANCOCIN) 2,500 mg in sodium chloride 0.9 % 250 mL intermittent infusion (mg) 2,500 mg New Bag 10/31/21 0930                Nephrotoxins and other renal medications (From now, onward)    Start     Dose/Rate Route Frequency Ordered Stop    21 0800  piperacillin-tazobactam (ZOSYN) 3.375 g vial to attach to  mL bag     Note to Pharmacy: For [facility], [facility] and [facility]: For Zosyn-naive patients, use the \"Zosyn initial dose + extended infusion\" order panel.    3.375 g  over 30 Minutes Intravenous EVERY 6 HOURS 21 0638      21 0700  vancomycin (VANCOCIN) 1000 mg in dextrose 5% 200 mL PREMIX      1,000 mg  200 mL/hr over 1 Hours Intravenous ONCE 21 0641      10/31/21 1012  vancomycin place whyte - receiving intermittent dosing      1 each Intravenous SEE ADMIN INSTRUCTIONS 10/31/21 1013      10/30/21 1100  norepinephrine (LEVOPHED) 16 mg in  mL infusion MAX CONC CENTRAL LINE      0.01-0.6 mcg/kg/min × 114.8 kg (Dosing Weight)  1.1-64.6 mL/hr  Intravenous CONTINUOUS 10/30/21 1039               Contrast Orders - past 72 hours (72h ago, onward)    None          Interpretation of " levels and current regimen:  Vancomycin level is reflective of therapeutic level    Has serum creatinine changed greater than 50% in last 72 hours: Yes    Urine output:  diminished urine output    Renal Function: Worsening    Plan:  1. Level close to the redoseable range, will give vancomycin 1000 mg x 1 dose.  Will continute to monitor levels and redose with appropriate.  2. Vancomycin monitoring method: Trough (Method 2 = manual dose calculation)  3. Vancomycin therapeutic monitoring goal: 15-20 mg/L  4. Pharmacy will check vancomycin levels as appropriate in 1-3 Days.  5. Serum creatinine levels will be ordered daily for the first week of therapy and at least twice weekly for subsequent weeks.    Grace Steiner, PharmD

## 2021-11-01 NOTE — PROGRESS NOTES
CLINICAL NUTRITION SERVICES - BRIEF NOTE  *Please see full assessment note from 10/30/2021    New Findings:  Labs: K+ 5.0-5.1 (high normal), Cr 3.58 (H), Phos 4.6 (H)    Nutritional needs adjusted with obesity in critical illness. Pt appears to be tolerating TF via OGT. No indications for ppFT placement at this time.     Dosing Weight: 115.3 kg admission weight for energy needs and IBW of 59 kg for protein needs     ASSESSED NUTRITION NEEDS   Estimated Energy Needs: 9885-0896 kcals/day (11 - 14 kcals/kg)   Justification: increased needs with obesity   Estimated Protein Needs: 118-148 grams protein/day (2 - 2.5 grams of pro/kg IBW)   Justification: Hypercatabolism with acute illness, obese     Interventions  Enteral Nutrition - NEW GOAL: Novasource Renal @ 30 ml/hr (720 ml) + 6 Prosource provides 1680 kcal (14.5 kcal/kg), 131 g pro (2.2 g/kg IBW), 132 g CHO, 516 ml free water, and 0 g fiber daily.        --Start @ new goal rate when formula arrives.       --HOB >30 degrees or Reverse Trendelenburg >10-25 degrees.       --Do not reduce TF rate or hold TF unless pt shows signs of intolerance (re: emesis).     RD to follow per protocol.    Rossy Marlow, MS, RD, LD, [facility]  MICU pager: [phone]  ASCOM: [id number]

## 2021-11-01 NOTE — PROGRESS NOTES
MEDICAL ICU Progress Note  11/01/2021    Date of Hospital Admission: 10/23/21  Date of ICU Admission: 10/30/21  Reason for Critical Care Admission: Acute Hypoxic Respiratory failure due to Covid PNA  Date of Service (when I saw the patient): 11/01/2021    ASSESSMENT: Trinidad Gillette is a 31 year old female with PMH diabetes, bipolar disorder, obesity and drug abuse who was admitted on 10/26/21 to [facility] for covid PNA. She was intubated on 10/29/21 and transferred to the [facility] MICU for further management.     Course:  10/22- COVID positive  10/26- Admitted w/COVID, Decadron x1 (unable to determine complete dosing at OSH)  10/27- Tocilizumab  10/29- Intubated  10/30- Proned, paralyzed, Epoprostenol, Decadron started for 10 day course      CHANGES and MAJOR THINGS TODAY:   - Stop gabapentin 2/2 TOI  - Renal ultrasound  - CXR  - Nephrology consult  - Cultures  - TTE  - Supinate  - Kidney ultrasound  - BLE ultrasound  - EKG  -  ml IV x1  - Lasix 120 mg IV x1  - Palliative Care consult  - Start Lantus 30 units subcutaneous BID  - Probable dialysis line placement  - Possible CRRT initiation pending stability       PLAN:    Neuro:  # Pain and sedation  - Midazolam gtt + prn  - Propofol gtt  - Dilaudid gtt + prn  - STOP pta gabapentin 800 mg TID 2/2 TOI  - RASS -4 to -5    # Chemical paralysis for vent synchrony  - Vecuronium drip  - Goal to be synchronous with the vent  - Follow BIS monitor for adequate sedation while paralyzed (goal 40-60)    # Retinal Detachment L eye  -Patient is blind in L eye and pupil is nonreactive to light  - Lacrilube every 8 hours 2/2 scleral edema    Pulmonary:  # Acute Hypoxic Respiratory Failure due to Covid PNA  # ARDS  Symptom onset 10/21. Positive test on 10/22. Hospitalized on 10/26. Intubated on 10/29.  - Dexamethasone 6mg x 10d (first dose on 10/27)  - Tocilizumab 10/27  - Duoneb q4h PRN  - Full strength veletri  - Attempted supination today with immediate  desaturation to 37% -> immediately reproned.   - Remains paralyzed and sedated  - Abx per ID section below    Ventilation Mode: CMV/AC  (Continuous Mandatory Ventilation/ Assist Control)  FiO2 (%): 85 %  Rate Set (breaths/minute): 30 breaths/min  Tidal Volume Set (mL): 380 mL  PEEP (cm H2O): 14 cmH2O  Oxygen Concentration (%): 100 %  Resp: 30    Cardiovascular:  # Concern for septic shock with hypotension  Had elevated lactic acid on admission, resolved with fluid.  Hypotension could be related to sedation medications, sepsis and hypovolemia  - Norepi drip to keep MAP >65  - Continue to monitor  - Telemetry per ICU routine  - Troponin normal  - TTE when able to tolerate supination -> ordered    GI/Nutrition:  # Severe malnutrition in context of acute condition  # Morbid obesity  - Osmolite 1.5@ 45 ml/hr  - FWF 30 ml every 4 hours  - PPI  - Bowel Regimen scheduled and PRN  - Dietician consulted, appreciate recs    Renal/Fluids/Electrolytes:  # TOI  # metabolic acidosis  Unclear creatinine baseline.  - Creat 0.9 on admit here; now up to 3.42  - FENA 0.5 assumed pre-renal    50g 5% albumin (10/30)    Lactated ringer 500cc IV x1 (11/1)  - Repeat BMP  - Renal ultrasound  - Nephrology consult  - Plan for placement of dialysis line in anticipation of CRRT    Endocrine:  # Poorly controlled T2DM  Hgb A1C 12.1 on admission  - Glargine 40u BID (1/2 home dose)  - Insulin drip   - Goal BG <180  - Hypoglycemic protocol  - Start Lantus 30 units subcutaneous BID    ID:  # Covid-19 PNA  -Decadron (10/27-  -Remdesivir deferred now that she is intubated  -Toci on 10/27  -Trend daily inflammatory markers x4 days  - procal 7.20 (0.06)  - CRP 98 (83)  - Cultures pending 11/1    Abx:   Zosyn 11/1-  Vanco 11/1-    Cultures:    Blood culture 10/30 with GPC in 1 of 2 bottles, negative verigene    Sputum culture 11/1    MRSA nasal swab 11/1    Legionella 11/1    Streptococcus 11/1        Hematology:    # Coagulopathy 2/2 COVID-19  D-dimer on  admission >20  - Enoxaparin 0.5mg/kg    Musculoskeletal:  # Deconditioning and weakness in the setting of critical illness  - PT/OT consultation when appropriate    Skin:  # Ecchymosis on left foot  - Pressure injury prevention strategies per ICU routine    General Cares/Prophylaxis:    DVT Prophylaxis: Enoxaparin (Lovenox) SQ  GI Prophylaxis: PPI  Restraints: None  Family Communication: Update Alanis hollis via telephone  Code Status: Full    Lines/tubes/drains:  - PIV  - PICC   - Arterial line    Disposition:  - Medical ICU     Patient seen and findings/plan discussed with medical ICU staff, Dr. Catie Sibley, APRN, CNP    -----------------------------------------------------------------------  Overnight Events:  Nursing notes reviewed. No acute overnight events. Supinated this am with immediate desaturation to 37% with cardiac rhythm change. Immediately resupinated. Concern for increasing creatinine.    PHYSICAL EXAMINATION:  Temp:  [99.9  F (37.7  C)-102.9  F (39.4  C)] 101.5  F (38.6  C)  Pulse:  [] 86  Resp:  [26-31] 30  BP: (124)/(60) 124/60  MAP:  [61 mmHg-86 mmHg] 78 mmHg  Arterial Line BP: ()/(40-62) 119/62  FiO2 (%):  [70 %-90 %] 85 %  SpO2:  [91 %-100 %] 95 %     General: ill-appearing, sedated, proned  HEENT: NCAT, L pupil irregular is nonreactive and dilated (chronic), ETT left-sedated  Neuro: Sedated and paralyzed, no spontaneous movement noted  Pulm/Resp: Breath diminished upper doses, crackles mid-lower bases, mechanically assisted   CV: RRR, S1/S2, no murmurs, rubs, gallops, appreciable edema  Abdomen: not assessed in prone position  : hart catheter in place, urine yellow and clear with sediment  Incisions/Skin: No noted skin breakdowns, skin graft on R thigh, healed incision in lower spine, ecchymosis on cheeks    LABS: Reviewed.   Arterial Blood Gases   Recent Labs   Lab 11/01/21  0423 11/01/21  0023 10/31/21  1629 10/31/21  0913   PH 7.22* 7.25* 7.24* 7.25*   PCO2 48*  45 43 44   PO2 141* 76* 92 71*   HCO3 20* 20* 19* 19*     Complete Blood Count   Recent Labs   Lab 11/01/21  0422 10/31/21  0440 10/30/21  0703   WBC 12.1* 18.8* 8.3   HGB 8.0* 10.1* 11.5*    419 320     Basic Metabolic Panel  Recent Labs   Lab 11/01/21  0656 11/01/21  0611 11/01/21  0504 11/01/21 0422 10/31/21  2159 10/31/21  2157 10/31/21  0938 10/31/21  0912 10/31/21  0331 10/31/21  0050 10/30/21  0746 10/30/21  0703   NA  --   --   --  140  --  142  --  140  --  141   < > 141   POTASSIUM  --   --   --  5.1  --  4.8  --   --   --  4.7  --  5.0   CHLORIDE  --   --   --  113*  --  116*  --   --   --  114*  --  114*   CO2  --   --   --  20  --  20  --   --   --  20  --  20   BUN  --   --   --  49*  --  47*  --   --   --  36*  --  26   CR  --   --   --  3.42*  --  3.08*  --  2.49*  --  2.15*   < > 0.94   * 241* 239* 270*   < > 200*   < >  --    < > 300*   < > 62*    < > = values in this interval not displayed.     Liver Function Tests  Recent Labs   Lab 10/30/21  0703   ALT 32   ALKPHOS 85   BILITOTAL 0.3   ALBUMIN 1.8*   INR 0.99     Coagulation Profile  Recent Labs   Lab 10/30/21  0703   INR 0.99   PTT 25       IMAGING:  No results found for this or any previous visit (from the past 24 hour(s)).

## 2021-11-01 NOTE — PROGRESS NOTES
"SPIRITUAL HEALTH SERVICES  PALLIATIVE SPIRITUAL ASSESSMENT   [facility] 4C    PRIMARY FOCUS:     Goals of care    Emotional/spiritual distress    Support for coping    Telephone conversation with patient Trinidad Gillette's sister Alanis, Palliative Care MD Jose Mathur, and resident MD Rojo, followed by prayer for Trinidad at Alanis's request.    Sister Alanis finds it hard to make a decision on Trinidad's behalf, because she loves Trinidad so much. Family are gathering from out of state to be together.    Distress: Alanis described the challenges with which Trinidad has lived for many years:     Health problems that began in 2007, worsening in the last four years after the death of Trinidad's , and worse yet in the last year.     Trinidad has also lived with grief, at times using substances to cope, per Alanis, since her 's death.    Alanis said, \"She's not strong when it comes to health.\" Alanis is worried that Trinidad's current condition is a sign that \"it's almost over.\" Alanis also reports that Trinidad herself thought \"she wasn't going to make it out of this\" COVID illness.     Coping/Meaning Making:     Alanis described Trinidad as being part of a mutually supportive family.     Trinidad loves all music, and listens to \"everything\", and Alanis believes she would enjoy any music staff wish to play.     Trinidad's cat Boots and being an aunt bring her the most mohit in the midst of struggle.     [facility] Trin[facility], and Alanis requested prayer. Alanis said that they believe that \"God makes everything happen for a reason, even if we can't understand that reason right away.\"    Intervention: Reviewed documentation. Offered spiritual and emotional support through listening presence, grief support, and values-based exploration of goals of care, as well as prayer.    PLAN: I will follow for spiritual support while Palliative Care is consulted.    Ceci Mckenzie  Palliative " Care Consult Service   Pager [phone]  [facility] Inpatient Team Consult pager [phone] (M-F 8-4:30)  After-hours Answering Service [phone]

## 2021-11-01 NOTE — PLAN OF CARE
Problem: ARDS (Acute Respiratory Distress Syndrome)  Goal: Effective Oxygenation  11/1/2021 0255 by Stan Davila RN  Outcome: Declining    ICU End of Shift Summary. See flowsheets for vital signs and detailed assessment.    Changes this shift: Dilaudid 2mg/hr, Propofol 30mcg/kg/min, Versed 13mg/hr, with BIS in 40's; Vec 1 mcg/kg/min, synchronous with vent. T-max 102.9F, Tylenol and cold compress given. SR 80s-90s, however new onset of ST depression, with T-wave inversion. Lytes normal; Trop 0.08 & 0.073. Unable to retrieve EKG 2/2 prone positioning. On Levo @ 0.02-0.04 mcg/kg/min; meeting MAP goal >65. Pt has been prone since 10/31 afternoon. 1x episode of spontaneous O2 desat to 69%, concurrent with PIPs spiked to 46, lasted about 1 minute in duration, before spontaneously improving. Episode unprovoked in nature, poss mucous plugging. Additionally, pt increasingly more difficult to perform head turns on, specifically into L-head/window facing position 2/2 marked facial/neck stiffness/edema/ + volume status. Initial P/F~95 on 80%, however after successfully turning pt, repeat gas showed marked improved P/F: 168, on 85%. CMV settings now: 70%/30/380/14.   NGT running goal TF @ 45ml/hr. BGLs uncontrolled; on insulin gtt-Alg 4+9. Rectal pouch exchanged, with total 75mL OP. Ongoing concern for inadequate UOP (25ml/hr). Pt now net up 7L.  Cr 3.42/GFR 17 and worsening. K+ 5.1 and uptrending. CRP 98 (83).      Plan: ABGs. Wean vent as able. Consider Neph consult? Is pt ECMO appropriate and/or eligible, if needed? If supinating today, plz attempt to place small bore post-pyloric.

## 2021-11-01 NOTE — PROGRESS NOTES
Attempted to supinate patient this AM with this RT and RNx4. Once supined, pt dropped SpO2 quickly to <70%. Emergently re-proned patient with recovery of SpO2 to mid 90's. Will continue Q2 head turns.     11/1/2021  Pily Hanson, RT

## 2021-11-02 NOTE — CONSULTS
[facility] Nurse Inpatient Pressure Injury Assessment   Reason for consultation: Evaluate and treat Left nare wound      ASSESSMENT  Pressure Injury: on Left soft tissue triange facet , hospital acquired ,   This is a Medical Device Related Pressure Injury (MDRPI) due to NG  Pressure Injury is Stage Deep Tissue Pressure Injury (DTPI)   Contributing factor of the pressure injury: pressure and immobility  Status: initial assessment  Recommend provider order: None, at this time     TREATMENT PLAN  Left nare facet wound: Every 2 hours   Check ETT, NJ and all other tubes for pressure points to skin  Leave wound open to air     Orders Written  WO Nurse follow-up plan:twice weekly  Nursing to notify the Provider(s) and re-consult the WO Nurse if wound(s) deteriorates or new skin concern.    Patient History  According to provider note(s):31 year old female with PMH diabetes, bipolar disorder, obesity and drug abuse who was admitted on 10/26/21 to [facility] for covid PNA. She was intubated on 10/29/21 and transferred to the [facility] MICU for further management now with HAP (MRSA, Strep agalactiae & Moraxella) 11/1    Objective Data  Containment of urine/stool: Indwelling catheter and External rectal pouch    Current Diet/ Nutrition:  Orders Placed This Encounter      NPO for Medical/Clinical Reasons Except for: Meds      Output:   I/O last 3 completed shifts:  In: 4266.31 [I.V.:2371.31; NG/GT:525; IV Piggyback:500]  Out: 3105 [Urine:2280; Stool:825]    Risk Assessment:   Sensory Perception: 1-->completely limited  Moisture: 3-->occasionally moist  Activity: 1-->bedfast  Mobility: 1-->completely immobile  Nutrition: 3-->adequate  Friction and Shear: 1-->problem  Darinel Score: 10      Labs:   Recent Labs   Lab 11/02/21  0325 11/01/21  1335 11/01/21  1145 11/01/21  0422 10/30/21  1116 10/30/21  0703 10/30/21  0703   ALBUMIN  --   --  1.7*  --   --    < > 1.8*   HGB 7.7*   < >  --  8.0*   < >  --  11.5*   INR  --   --   --    --   --   --  0.99   WBC 9.7   < >  --  12.1*   < >  --  8.3   A1C  --   --   --   --   --   --  12.1*   CRP  --   --   --  98.0*   < >  --  5.7    < > = values in this interval not displayed.       Physical Exam  Skin inspection: focused nose    Wound Location:  Nose Left soft tissue triangle facet      Date of last Photo 11/2/21  Wound History: pt has been proned. Nasogastric tube in left nostril  Measurements (length x width x depth, in cm) 0.7 cm x 0.5 cm  x  0 cm   Wound Base:  100 % nonblanchable maroon intact epithelium  Palpation of the wound bed: normal   Periwound skin: intact  Color: pink  Temperature: normal   Drainage:, none  Description of drainage: none  Odor: none  Pain: no grimacing or signs of discomfort,   Current tube securement should not allow NJ tube to cause pressure     Interventions  Current support surface: Standard  Low air loss mattress  Current off-loading measures: Foam padding, Pillows under calves and Pillows  Repositioning aid: Pillows  Visual inspection of wound(s) completed   Tube Securement: taped to ETT  Wound Care: was not done per plan of care.  Supplies: none  Discussed importance of:off-loading pressure to wound  Discussed plan of care with Nurse

## 2021-11-02 NOTE — PHARMACY-ADMISSION MEDICATION HISTORY
Admission Medication History Completed by Pharmacy    See [facility] Admission Navigator for allergy information, preferred outpatient pharmacy, prior to admission medications and immunization status.     Medication History Sources:   Medication history interview sources:  EPIC chart review - office visit family medicine (09/23/21); NO patient/family interview      Changes made to PTA medication list (reason):    Added: all    Additional Information:    Recent hand surgery - requested refill for gabapentin 10/7 - no refills given - unsure if will continue after RX complete    Did not enter PRN medications listed into medication list    Prior to Admission medications    Medication Sig Last Dose Taking? Auth Provider   cariprazine (VRAYLAR) 1.5 MG CAPS capsule Take 1.5 mg by mouth daily  Yes Unknown, Entered By History   gabapentin (NEURONTIN) 800 MG tablet Take 800 mg by mouth 3 times daily  Yes Unknown, Entered By History   insulin aspart (NOVOLOG PEN) 100 UNIT/ML pen Inject 30 Units Subcutaneous 3 times daily (with meals)  Yes Unknown, Entered By History   insulin aspart (NOVOLOG PEN) 100 UNIT/ML pen Inject Subcutaneous 3 times daily (with meals) Sliding scale  Yes Unknown, Entered By History   insulin glargine (LANTUS PEN) 100 UNIT/ML pen Inject 80 Units Subcutaneous 2 times daily  Yes Unknown, Entered By History   omeprazole (PRILOSEC) 20 MG DR capsule Take 20 mg by mouth daily  Yes Unknown, Entered By History   traZODone (DESYREL) 150 MG tablet Take 300 mg by mouth At Bedtime  Yes Unknown, Entered By History       Date completed: 11/02/21    Medication history completed by: Elizabeth Thompson [facility]

## 2021-11-02 NOTE — PROGRESS NOTES
Antimicrobial Stewardship Team Note    Antimicrobial Stewardship Program - A joint venture between [facility] and  Physicians to optimize antibiotic management.  NOT a formal consult - Restricted Antimicrobial Review     Patient: Trinidad Gillette  MRN: [id number]  Allergies: Vancomycin, Tramadol, Cefaclor, and Hydrocodone-acetaminophen    Brief Summary: Trinidad Gillette is a 31 year old female with PMH of diabetes, bipolar disorder, obesity and previous MRSA infection admitted on 10/23/21 to OSH for COVID pneumonia, transferred to  MICU on 10/30.    HPI: Patient experiencing acute hypoxic respiratory failure and ARDS. She became symptomatic on 10/21, tested positive for COVID-19 on 10/22, was hospitalized at OSH on 10/26 and was intubated on 10/29. She began dexamethasone 6 mg and received tocilizumab on 10/27. She arrived to [facility] on 10/30 with a WBC 8.3, procalcitonin 0.06, lactate 3.2 and with a low grade fever (100.5F). At this time, she had blood cultures drawn that grew Strep salivarius (1/4 bottles), Verigene negative, Strep pneumo urine negative, Legionella urine negative. On 10/31, she showed significant signs of superimposed bacterial infection, including procalcitonin 7.2, WBC 18.8, fever of 102.9F and she was empirically started on vancomycin and Zosyn for ventilator associated pneumonia. Sputum culture obtained 10/30 from endotracheal grew 4+ Strep agalactiae, 4+ Moraxella catarrhalis and 2+ MRSA. Further blood cultures were collected 11/1 and have no growth to date x 12 hours.    10/30 chest xray: Diffuse mixed interstitial and airspace opacities bilaterally compatible with COVID pneumonia.          Active Anti-infective Medications   (From admission, onward)                 Start     Stop    11/02/21 1400  cefTRIAXone  2 g,   Intravenous,   EVERY 24 HOURS     Ventilator-Associated Pneumonia        --    10/31/21 1012  Vancomycin Place Gallardo - Receiving Intermittent Dosing  1 each,    Intravenous,   SEE ADMIN INSTRUCTIONS     Sepsis        --                  Assessment: COVID-19 pneumonia with superimposed ventilator-associated bacterial pneumonia  Patient remains mechanically ventilated in the ICU however fever and leukocytosis have resolved since initiation of antibiotics. Pseudomonas was not found in the isolated sputum culture. Not necessary to include an anti-pseudomonal agent. Agree with transition of Zosyn to ceftriaxone, as the isolated Moraxella catarrhalis from the sputum culture is susceptible. Continue vancomycin therapy to cover for MRSA and Streptococcus agalactiae. Recommend a 7 day total course of antibiotic therapy for VAP. There is also concern about bacteremia with Streptococcus salivarius due to isolation from blood culture on 10/30. Ceftriaxone is likely to cover for this as well. For treatment of bacteremia, recommend a 10 day total course of ceftriaxone. Further blood cultures are pending - if growth of Streptococcus salivarius is seen again, reasonable to do further ID workup and evaluate for source control.    Recommendations:  1. Continue vancomycin for MRSA VAP. Recommend 7 days of therapy: last dose 11/6/21  2. Agree with start of ceftriaxone 2g IV q24 hours for additional VAP coverage and bacteremia. Recommend 10 days of therapy: last dose 11/9/21    Pharmacy took the following actions: Called/paged provider, Electronic note created.    Discussed with ID Staff Dr. Lucas Dinero MD and Minerva Bateman, AshelyD   Hector Reece, AshelyD Candidate 2022    Vital Signs/Clinical Features:  Vitals         10/31 0700  -  11/01 0659 11/01 0700  -  11/02 0659 11/02 0700  -  11/02 1317   Most Recent    Temp ( F) 99.9 -  102.9    94.5 -  98.4    97.9 -  99.7     97.9 (36.6)    Pulse 86 -  109    68 -  92    84 -  95     86    Resp 26 -  31      30      30     30    BP   124/60             SpO2 (%) 91 -  100    84 -  100    89 -  99     94            Labs  Estimated Creatinine  Clearance: 28.9 mL/min (A) (based on SCr of 3.6 mg/dL (H)).  Recent Labs   Lab Test 11/01/21  0422 11/01/21  1145 11/01/21  1745 11/01/21  2057 11/02/21  0325 11/02/21  0957   CR 3.42* 3.58* 3.40* 3.45* 3.41* 3.60*       Recent Labs   Lab Test 10/30/21  0703 10/30/21  0703 10/31/21  0440 11/01/21  0422 11/01/21  1335 11/02/21  0325   WBC 8.3  --  18.8* 12.1* 11.6* 9.7   ANEU 6.7  --   --   --   --   --    ALYM 1.3  --   --   --   --   --    HAKAN 0.1  --   --   --   --   --    AEOS 0.0  --   --   --   --   --    HGB 11.5*  --  10.1* 8.0* 7.7* 7.7*   HCT 36.2  --  31.5* 25.7* 24.3* 24.4*   MCV 92  --  92 92 92 91      < > 419 324 290 283    < > = values in this interval not displayed.       Recent Labs   Lab Test 10/30/21  0703 11/01/21  1145   BILITOTAL 0.3 0.4   ALKPHOS 85 82   ALBUMIN 1.8* 1.7*   AST  --  31   ALT 32 20       Recent Labs   Lab Test 10/30/21  0703 10/30/21  1116 10/30/21  1844 10/31/21  0050 11/01/21  0422 11/01/21  1150   PCAL 0.06*  --   --  7.20*  --   --    LACT 3.2* 2.2* 1.4  --   --  1.8   CRP 5.7 7.7  --  83.0* 98.0*  --        Recent Labs   Lab Test 11/01/21  0422   VANCOMYCIN 21.4       Culture Results:  7-Day Micro Results       Procedure Component Value Units Date/Time    Legionella pneumophila antigen urine [[id number]]  (Normal) Collected: 11/01/21 1109    Order Status: Completed Lab Status: Final result Updated: 11/01/21 1304    Specimen: Urine, Ocampo Catheter      Legionella pneumophila serogroup 1 urinary antigen Negative     Comment: Suggests no recent or current infection. Infection due to Legionella cannot be ruled out, since other serogroups and species may cause disease, antigen may not be present in urine in early infection, and the level of antigen present in the urine may be below detectable limits of the test.       Streptococcus pneumoniae antigen [[id number]]  (Normal) Collected: 11/01/21 1109    Order Status: Completed Lab Status: Final result Updated: 11/01/21  1304    Specimen: Urine, Ocampo Catheter      Streptococcus pneumoniae antigen Negative     Comment: A negative Streptococcus pneumoniae antigen result does not rule out infection with Streptococcus pneumoniae.       Blood Culture Arm, Left [[id number]]  (Normal) Collected: 11/01/21 1040    Order Status: Completed Lab Status: Preliminary result Updated: 11/02/21 0217    Specimen: Blood from Arm, Left      Culture No growth after 12 hours    Blood Culture Arm, Left [[id number]]  (Normal) Collected: 11/01/21 1011    Order Status: Completed Lab Status: Preliminary result Updated: 11/02/21 0217    Specimen: Blood from Arm, Left      Culture No growth after 12 hours    Respiratory Aerobic Bacterial Culture [[id number]] Collected: 11/01/21 0944    Order Status: Completed Lab Status: Preliminary result Updated: 11/02/21 1132    Specimen: Sputum from Endotracheal      Culture Culture in progress     Gram Stain Result <25 PMNs/low power field      1+ Mixed jeannette    Urine Culture [[id number]] Collected: 11/01/21 0944    Order Status: Sent Lab Status: In process Updated: 11/01/21 1048    Specimen: Urine, Ocampo Catheter     Blood Culture Hand, Left [[id number]]  (Normal) Collected: 10/31/21 1222    Order Status: Completed Lab Status: Preliminary result Updated: 11/01/21 1446    Specimen: Blood from Hand, Left      Culture No growth after 1 day    Blood Culture Hand, Right [[id number]]  (Normal) Collected: 10/30/21 0917    Order Status: Completed Lab Status: Preliminary result Updated: 11/01/21 1646    Specimen: Blood from Hand, Right      Culture No growth after 2 days    Narrative:      Only an Aerobic Blood Culture Bottle was collected, interpret results with caution.    Blood Culture Hand, Right [[id number]]  (Abnormal)  (Susceptibility) Collected: 10/30/21 0703    Order Status: Completed Lab Status: Final result Updated: 11/02/21 1252    Specimen: Blood from Hand, Right      Culture Positive on the 1st day of  incubation      Streptococcus salivarius group     Comment: 1 of 2 bottles       Susceptibility       Streptococcus salivarius group (1)       Antibiotic Interpretation Sensitivity Method Status    Ampicillin Intermediate 0.5 ug/mL JANEEN Final    Penicillin Intermediate 0.5 ug/mL JANEEN Final    Clindamycin Resistant >0.5 ug/mL JANEEN Final    Cefotaxime Susceptible <=0.25 ug/mL JANEEN Final    Ceftriaxone Susceptible <=0.25 ug/mL JANEEN Final    Vancomycin Susceptible 0.5 ug/mL JANEEN Final    Meropenem Susceptible 0.25 ug/mL JANEEN Final                       Verigene GP Panel [[id number]]  (Normal) Collected: 10/30/21 0703    Order Status: Completed Lab Status: Final result Updated: 10/31/21 1036    Specimen: Blood from Hand, Right      Staphylococcus species Not Detected     Staphylococcus aureus Not Detected     Staphylococcus epidermidis Not Detected     Staphylococcus lugdunensis Not Detected     Enterococcus faecalis Not Detected     Enterococcus faecium Not Detected     Streptococcus species Not Detected     Streptococcus agalactiae Not Detected     Streptococcus anginosus group Not Detected     Streptococcus pneumoniae Not Detected     Streptococcus pyogenes Not Detected     Listeria species Not Detected    Narrative:      Specimen tested with [employer] multiplex, gram-positive blood culture nucleic acid test for the following targets: Staphylococcus aureus, Staphylococcus epidermidis, Staphylococcus lugdunensis, other Staphylococcus species, Enterococcus faecalis, Enterococcus faecium, Streptococcus species, Streptococcus agalactiae, Streptococcus anginosus group, Streptococcus pneumoniae, Streptococcus pyogenes, Listeria species, mecA (methicillin resistance), and Cori/vanB (vancomycin resistance).  Final identification and antimicrobial susceptibility testing will be verified by standard methods.      Methicillin Resist/Sens S. aureus PCR [[id number]] Collected: 10/30/21 0550    Order Status: Completed Lab Status: Final  result Updated: 10/30/21 1257    Specimen: Swab from Nares, Bilateral      MRSA Target DNA Negative     SA Target DNA Positive    Narrative:      The [employer]  Xpert SA Nasal Complete assay performed in the [employer]  Dx System is a qualitative in vitro diagnostic test designed for rapid detection of Staphylococcus aureus (SA) and methicillin-resistant Staphylococcus aureus (MRSA) from nasal swabs in patients at risk for nasal colonization. The test utilizes automated real-time polymerase chain reaction (PCR) to detect MRSA/SA DNA. The Xpert SA Nasal Complete assay is intended to aid in the prevention and control of MRSA/SA infections in healthcare settings. The assay is not intended to diagnose, guide or monitor treatment for MRSA/SA infections, or provide results of susceptibility to methicillin. A negative result does not preclude MRSA/SA nasal colonization.     Respiratory Aerobic Bacterial Culture [[id number]]  (Abnormal)  (Susceptibility) Collected: 10/30/21 0547    Order Status: Completed Lab Status: Final result Updated: 11/02/21 0849    Specimen: Sputum from Endotracheal      Culture 3+ Normal jeannette      4+ Streptococcus agalactiae (Group B Streptococcus)     Comment: This organism is susceptible to ampicillin, penicillin, vancomycin and the cephalosporins. If treatment is required and your patient is allergic to penicillin, contact the microbiology lab within 5 days to request susceptibility testing.         4+ Moraxella catarrhalis      2+ Staphylococcus aureus MRSA     Gram Stain Result >25 PMNs/low power field      4+ Mixed jeannette    Susceptibility       Staphylococcus aureus MRSA (4)       Antibiotic Interpretation Sensitivity Method Status    Oxacillin Resistant >=4.0 ug/mL JANEEN Final    Gentamicin Susceptible <=0.5 ug/mL JANEEN Final    Ciprofloxacin  [*]  Resistant >=8.0 ug/mL JANEEN Final    Levofloxacin  [*]  Resistant 4.0 ug/mL JANEEN Final    Moxifloxacin  [*]  Resistant 2.0 ug/mL JANEEN Final    Inducible  macrolide resistance test  [*]  Negative Negative ug/mL JANEEN Final    Erythromycin Resistant >=8.0 ug/mL JANEEN Final    Clindamycin Resistant >=8.0 ug/mL JANEEN Final    Quinupristin/Dalfopristin  [*]  Susceptible <=0.25 ug/mL JANEEN Final    Linezolid Susceptible 2.0 ug/mL JANEEN Final    Vancomycin Susceptible 1.0 ug/mL JANEEN Final    Tetracycline Susceptible <=1.0 ug/mL JANEEN Final    Tigecycline  [*]  Susceptible <=0.12 ug/mL JANEEN Final    Nitrofurantoin  [*]  Susceptible <=16.0 ug/mL JANEEN Final    Rifampin  [*]  Susceptible <=0.5 ug/mL JANEEN Final    Trimethoprim/Sulfamethoxazole Susceptible <=0.5/9.5 ug/mL JANEEN Final      Susceptibility Comments       MRSA requires contact precautions.                      Moraxella catarrhalis (3)       Antibiotic Interpretation Sensitivity Method Status    Ampicillin Resistant   JANEEN Final     Intrinsically Resistant    Penicillin Resistant   JANEEN Final     Intrinsically Resistant    Amoxicillin/Clavulanate Susceptible 0.25 ug/mL JANEEN Final    Azithromycin Susceptible 0.047 ug/mL JANEEN Final    Ceftriaxone Susceptible 0.75 ug/mL JANEEN Final    Clindamycin Intermediate 1.0 ug/mL JANEEN Final    Levofloxacin Susceptible 0.064 ug/mL JANEEN Final    Trimethoprim/Sulfamethoxazole Susceptible 0.250 ug/mL JANEEN Final               [*]  Suppressed Antibiotic            Condensed View                       Streptococcus pneumoniae antigen [[id number]]  (Normal) Collected: 10/30/21 0527    Order Status: Completed Lab Status: Final result Updated: 10/30/21 1157    Specimen: Urine, Ocampo Catheter      Streptococcus pneumoniae antigen Negative     Comment: A negative Streptococcus pneumoniae antigen result does not rule out infection with Streptococcus pneumoniae.       Legionella pneumonia antigen urine [[id number]]  (Normal) Collected: 10/30/21 0527    Order Status: Completed Lab Status: Final result Updated: 10/30/21 1157    Specimen: Urine, Ocampo Catheter      Legionella pneumophila serogroup 1 urinary antigen  Negative     Comment: Suggests no recent or current infection. Infection due to Legionella cannot be ruled out, since other serogroups and species may cause disease, antigen may not be present in urine in early infection, and the level of antigen present in the urine may be below detectable limits of the test.       Blood Culture Peripheral Blood     Order Status: Canceled Lab Status: No result     Specimen: Peripheral Blood     Blood Culture Peripheral Blood     Order Status: Canceled Lab Status: No result     Specimen: Peripheral Blood             Recent Labs   Lab Test 10/30/21  1843 11/01/21  0944   URINEPH 5.5 5.5   NITRITE Negative Negative   LEUKEST Trace* Moderate*   WBCU 10* 84*                         Imaging: XR Chest Port 1 View    Result Date: 10/30/2021  EXAM: XR CHEST PORT 1 VIEW  10/30/2021 5:04 AM HISTORY:  Evaluate ET tube positioning   COMPARISON:  None TECHNIQUE: Portable AP radiograph of the chest. FINDINGS: Support devices: Endotracheal tube tip projects over the midthoracic trachea. Enteric tube courses below the diaphragm and beyond the field-of-view. The trachea is midline. Heart size is within normal limits. Diffuse mixed interstitial and airspace opacities bilaterally. No pleural effusion or pneumothorax. No osseous abnormality. No acute abnormality in the visualized upper abdomen.     IMPRESSION: 1. Endotracheal tube tip projects over the midthoracic trachea. 2. Diffuse mixed interstitial and airspace opacities bilaterally compatible with COVID pneumonia. I have personally reviewed the examination and initial interpretation and I agree with the findings. NAVEEN MARCANO MD   SYSTEM ID:  [id number]    XR Abdomen Port 1 View    Result Date: 10/30/2021  EXAMINATION:  XR ABDOMEN PORT 1 VIEWS 10/30/2021 5:04 AM COMPARISON: Same-day chest radiograph HISTORY: Evaluate OG tube TECHNIQUE: Frontal views of the abdomen. FINDINGS: Devices: Enteric tube tip projects over the distal stomach. No  abnormally dilated loops of bowel. No pneumatosis or portal venous gas. Mixed interstitial and airspace opacities in the visualized lungs.     IMPRESSION: 1. Enteric tube tip projects over the distal stomach. 2. Nonobstructive bowel gas pattern. 3. Mixed interstitial and airspace opacities in the visualized lungs. Please see separate chest radiograph. I have personally reviewed the examination and initial interpretation and I agree with the findings. NAVEEN MARCANO MD   SYSTEM ID:  [id number]

## 2021-11-02 NOTE — PLAN OF CARE
Problem: ARDS (Acute Respiratory Distress Syndrome)  Goal: Effective Oxygenation  Outcome: No Change    ICU End of Shift Summary. See flowsheets for vital signs and detailed assessment.    Changes this shift: Dilaudid 2mg/hr, Propofol 30mcg/kg/min, Versed 13mg/hr, with BIS in 20-30's; Vec 1 mcg/kg/min, synchronous with vent. Hypothermic~low of 94.5F, bear-hugger applied, with resolution. Otherwise SR 70s-80s, with T-wave inversion. K+ 5.5; subsequently diuresed with 80mg Lasix IVP, with AM recheck for 5.4, MD aware, with no current plan for further shifting, at this time. Proned since 10/31 afternoon. Pt does desaturate intermittently with head turns. Initial P/F~216, on 50%. 2nd P/F 179 on 40%. Now on CMV settings: 50%/30/380/14. NGT running goal TF @ 30ml/hr. Pete PI now present 2/2 large bore NGT. Optifoam applied. BGLs improved with very liberalized insulin gtt regimen; Alg 4+15 (23u/hr). Rectal pouch; 225mL OP. Ocampo; approx 100 mL/hr however 2/2 aggressive and intermittent diuresis. Pt now net up 9L since admission.  Cr 3.41/GFR 17. HepXa first therapeutic 0.37 @ 1000u/hr.      Plan: ABGs. Wean FiO2/PEEP as able. Potentially reattempt supinatopm; if tolerates plan for HD/CRRT line, CXR, EKG, small bore post-pyloric NJT, renal US. Neph & Palliative reccs.

## 2021-11-02 NOTE — PROGRESS NOTES
Nephrology Progress Note  11/02/2021       Trinidad Gillette is a 31 yof with hx of DM2, bipolar disorder, Obesity and Polysubstance abuse who is admitted with severe COVID, intubated on 10/30 and very unstable requiring proning.  Nephrology consulted for rapidly rising Cr and increasing K.      Interval History :   Mrs Gillette continues with TOI although Cr is leveling out and UOP is sufficient to keep up with intake so far this am.  Would like to hold off on RRT for now and see how this trends.  Can manage K with high dose lasix (ordered 120mg x3 today), can give isotonic bicarb if she is more net negative than desired to replace volume.  May avoid RRT although is still tenuous, oxygenation and ventilation are a bit improved this am although she remains proned.      Assessment & Recommendations:   TOI-Baseline Cr normal at 0.9, has long hx of UTI's, renal US ordered.  Cr up sharply in the 48h prior to consult in setting of sepsis with fevers, likely high inflammatory state.  The risk of placing line is higher than usual due to her intolerance of even short time supine, currently proned.  We are taking the approach of checking labs q6h and would offer CRRT if K is high, eventually may be forced to consider for volume.  I did call and have long conversation with Pt's sister Caroline and she is discussing with other family members whether Mrs Gillette would want RRT as part of her care when indicated although Cr has leveled out and is making some UOP.                   -TOI, likely hypoperfusion and inflammation from COVID sepsis. Creatinine is stable to  No indications for dialysis at this time. I am hoping we are seeing a plateau. If not, acidosis may drive our need to initiate. Family is unsure of the trajectory they wish to take and if we reach that point will need to discuss.                 -No HD access currently.                   Volume-Net positive ~2L yesterday and up nearly 10L for course but even today so far with  "last lasix dose ~midnight.  Can continue lasix for now to excrete K and keep even from volume standpoint, will need large doses (I would recommend 120mg x3 today), can give isotonic bicarb IV if she is more net negative than desired.   This dose does appear to reach threshold.      Electrolytes/pH-K 5.4 on last check,   ABG 7.28/42/69/19, both oxygenation and ventilation are a bit improved this am.  Some of the acidosis is hyperchloremic from our fluids.Also getting tube feeds in the setting of kidney injury. Could add bicarbonate to her tube feeds.      BMD-Ca 7.3, Mg and Phos reasonable.       COVID-Unvaccinated (Not listed in vaccinations in outpt record from 10/11/2021), on dexamethasone, received tocilizumab x2 days.  On enoxaparin for DVT prophylaxis.       Anemia-Hgb 7.7 may be dilutional from ~11 yesterday with volume expansion, acute management per team.       Seen and discussed with Dr Ramey     Recommendations were communicated to primary team via verbal communication.        FRIEDA Agarwal CNS  Clinical Nurse Specialist  [phone]  Attestation:  This patient has been seen, examined and evaluated by me, Sandra Ramey MD as a shared visit with the NP/PA above.     In brief:  Trinidad Gillette is a 31 year old female with covid, TOI, acidosis.     I personally reviewed major complaints, physical findings, investigations, medications, lab values, vital signs, I/O's and the overall management plan.      My key findings and Decisions are noted in bold above.        Sandra Ramey MD MS  Date of service (date I saw patient) 2 nov 2021    Review of Systems:   I reviewed the following systems:  ROS not done due to vent/sedation.     Physical Exam:   I/O last 3 completed shifts:  In: 4266.31 [I.V.:2371.31; NG/GT:525; IV Piggyback:500]  Out: 3105 [Urine:2280; Stool:825]   /60 (BP Location: Left arm)   Pulse 83   Temp 98.4  F (36.9  C)   Resp 30   Ht 1.676 m (5' 6\")   Wt 115.3 kg (254 lb 3.1 " oz)   SpO2 93%   BMI 41.03 kg/m       PE not done due to +COVID status but seen from hallway through door and discussed PE with primary RN and team.      Labs:   All labs reviewed by me  Electrolytes/Renal - Recent Labs   Lab Test 11/02/21  0600 11/02/21  0503 11/02/21  0325 11/01/21  2250 11/01/21  2057 11/01/21  1754 11/01/21  1745 11/01/21  0504 11/01/21  0422 10/31/21  2159 10/31/21  2157 10/31/21  0331 10/31/21  0050   NA  --   --  141  --  142  --  141   < > 140   < > 142   < > 141   POTASSIUM  --   --  5.4*  --  5.5*  --  5.1   < > 5.1   < > 4.8   < > 4.7   CHLORIDE  --   --  111*  --  113*  --  115*   < > 113*   < > 116*   < > 114*   CO2  --   --  20  --  19*  --  19*   < > 20   < > 20   < > 20   BUN  --   --  62*  --  59*  --  56*   < > 49*   < > 47*   < > 36*   CR  --   --  3.41*  --  3.45*  --  3.40*   < > 3.42*   < > 3.08*   < > 2.15*   * 175* 182*   < > 221*   < > 181*   < > 270*   < > 200*   < > 300*   ARMAAN  --   --  7.4*  --  7.4*  --  7.2*   < > 7.2*   < > 7.4*   < > 7.3*   MAG  --   --   --   --   --   --   --   --  2.3  --  2.4*  --  2.3   PHOS  --   --   --   --   --   --   --   --  4.6*  --  3.4  --  5.2*    < > = values in this interval not displayed.       CBC -   Recent Labs   Lab Test 11/02/21 0325 11/01/21  1335 11/01/21 0422   WBC 9.7 11.6* 12.1*   HGB 7.7* 7.7* 8.0*    290 324       LFTs -   Recent Labs   Lab Test 11/01/21  1145 10/30/21  0703   ALKPHOS 82 85   BILITOTAL 0.4 0.3   ALT 20 32   AST 31  --    PROTTOTAL 5.6* 7.1   ALBUMIN 1.7* 1.8*       Iron Panel - No lab results found.        Current Medications:    albuterol  2.5 mg Nebulization Q4H     dexamethasone  6 mg Intravenous Daily     [Held by provider] gabapentin  300 mg Oral Q8H ANJELICA     insulin glargine  40 Units Subcutaneous BID     LubriFresh P.M.  1 Application Both Eyes Q8H     multivitamins w/minerals  15 mL Per Feeding Tube Daily     pantoprazole  40 mg Per Feeding Tube QAM AC     piperacillin-tazobactam   3.375 g Intravenous Q6H     polyethylene glycol  17 g Oral Daily     protein modular  2 packet Per Feeding Tube TID     senna-docusate  1 tablet Oral BID    Or     senna-docusate  2 tablet Oral BID     vancomycin place whyte - receiving intermittent dosing  1 each Intravenous See Admin Instructions       dextrose       dextrose       epoprostenol (VELETRI) 20 mcg/mL in sterile water inhalation solution 20 ng/kg/min (11/02/21 0618)     heparin 1,000 Units/hr (11/02/21 0600)     HYDROmorphone 2 mg/hr (11/02/21 0600)     insulin regular 23 Units/hr (11/02/21 0600)     midazolam 13 mg/hr (11/02/21 0600)     norepinephrine Stopped (11/01/21 1421)     propofol (DIPRIVAN) infusion 30 mcg/kg/min (11/02/21 0600)     vecuronium (NORCURON) infusion ADULT 1 mcg/kg/min (11/02/21 0734)

## 2021-11-02 NOTE — PLAN OF CARE
ICU End of Shift Summary. See flowsheets for vital signs and detailed assessment.    Changes this shift: Supinated this morning at 11, FIO2 70%, PEEP 14  ABG done a few hours post supination, which revealed P/F 101.  CXR, BLE ultrasound (neg DVT's), renal ultrasound and Echo done while supinated. CXR revealed new pneumomediastinum with subcutaneous emphysema in the right chest wall and bilateral visualized neck bases (RN outlined area), MD aware and ventilator setting changed to Tv 360(previously 380) and PEEP decreased from 14 to 10, ABG drawn hours post ventilator changes  P/F 133 on 60% FIO2. Remains medically sedated and paralyzed not over breathing ventilator BIS 20's-50's.  Remains +9L since admission despite aggressive diuresing, hyperkalemic with last potassium 5.7 w/o any EKG changes. Bicarb drip infusing at well 150 meq in 1L infusing at 50 ml/per.  Family updated throughout the day via IPAD.  Respiratory status remains tenuous.     Plan:  Monitor Pip's closely, inform the team of any increases in Pip's, Reposition head every two hours as tolerated and continues w/ goals of care.  Problem: Gas Exchange Impaired  Goal: Optimal Gas Exchange  Outcome: No Change  Intervention: Optimize Oxygenation and Ventilation  Recent Flowsheet Documentation  Taken 11/2/2021 1600 by Roxanne Harrison RN    Problem: ARDS (Acute Respiratory Distress Syndrome)  Goal: Effective Oxygenation  Outcome: No Change

## 2021-11-03 NOTE — PHARMACY-VANCOMYCIN DOSING SERVICE
Pharmacy Vancomycin Note  Date of Service November 3, 2021  Patient's  1990   31 year old, female    Indication: Sepsis  Day of Therapy: 3  Current vancomycin regimen:  Intermittent due to TOI   Current vancomycin monitoring method: Trough (Method 2 = manual dose calculation)  Current vancomycin therapeutic monitoring goal: 15-20 mg/L    Current estimated CrCl = Estimated Creatinine Clearance: 33.5 mL/min (A) (based on SCr of 3.11 mg/dL (H)).    Creatinine for last 3 days  10/31/2021:  9:57 PM Creatinine 3.08 mg/dL  2021:  4:22 AM Creatinine 3.42 mg/dL; 11:45 AM Creatinine 3.58 mg/dL;  5:45 PM Creatinine 3.40 mg/dL;  8:57 PM Creatinine 3.45 mg/dL  2021:  3:25 AM Creatinine 3.41 mg/dL;  9:57 AM Creatinine 3.60 mg/dL;  4:10 PM Creatinine 3.43 mg/dL; 10:04 PM Creatinine 3.30 mg/dL  11/3/2021:  3:42 AM Creatinine 3.11 mg/dL    Recent Vancomycin Levels (past 3 days)  2021:  4:22 AM Vancomycin 21.4 mg/L  2021:  2:54 PM Vancomycin 23.3 mg/L ~ 30 hours post dose (1000mg  @ 0856)    Vancomycin IV Administrations (past 72 hours)                   vancomycin (VANCOCIN) 1000 mg in dextrose 5% 200 mL PREMIX (mg) 1,000 mg New Bag 21 0526    vancomycin (VANCOCIN) 1000 mg in dextrose 5% 200 mL PREMIX (mg) 1,000 mg New Bag 21 0856                Nephrotoxins and other renal medications (From now, onward)    Start     Dose/Rate Route Frequency Ordered Stop    10/31/21 1012  vancomycin place whyte - receiving intermittent dosing      1 each Intravenous SEE ADMIN INSTRUCTIONS 10/31/21 1013      10/30/21 1100  norepinephrine (LEVOPHED) 16 mg in  mL infusion MAX CONC CENTRAL LINE      0.01-0.6 mcg/kg/min × 114.8 kg (Dosing Weight)  1.1-64.6 mL/hr  Intravenous CONTINUOUS 10/30/21 1039               Contrast Orders - past 72 hours (72h ago, onward)    Start     Dose/Rate Route Frequency Ordered Stop    21 1500  perflutren diluted 1mL to 2mL with saline (OPTISON) diluted injection 4 mL       4 mL Intravenous ONCE 11/02/21 1448 11/02/21 1448          Interpretation of levels and current regimen:  Vancomycin level is reflective of supratherapeutic level  Has serum creatinine changed greater than 50% in last 72 hours: No  Urine output:  good urine output - aggressively diuresising   Renal Function: Worsening/stable     Plan:  1. Will redose vancomycin 1000mg IV x1 11/3 @ 0400  2. Vancomycin monitoring method: Trough (Method 2 = manual dose calculation)  3. Vancomycin therapeutic monitoring goal: 15-20 mg/L  4. Pharmacy will check vancomycin levels as appropriate in 1-3 Days.  5. Serum creatinine levels will be ordered daily.    Elizabeth Thompson, PharmD  [facility] Pharmacist  [phone]  [phone]

## 2021-11-03 NOTE — PROGRESS NOTES
[facility]  Palliative Care Daily Progress Note       Recommendations & Counseling       No changes in recommendations today; noted that MICU and nephrology teams are continuing monitoring without need for immediate dialysis.    We remain available for ongoing support and will continue to follow to assess changing needs.       Thank you for the opportunity to continue to participate in the care of this patient and family.  Please feel free to contact on-call palliative provider with any emergent needs.  We can be reached via team pager [phone] (answered 8-4:30 Monday-Friday); after-hours answering service ([phone]);     This patient's care was discussed with palliative care staff, Dr. Iqbal.    Maci Rojo MD  Internal Medicine, PGY-3       Assessments          Trinidad Gillette is a 31 year old female with a past medical history significant for diabetes, bipolar disorder, obesity and drug abuse who was admitted on 10/26/21 to [facility] for covid PNA. She was intubated on 10/29/21 and transferred to the [facility] MICU for further management. She has been proned since 10/30, and attempts to supinate her today resulted in desats and emergently re-proning. She additionally has developed TOI and may need to start CRRT. Palliative care consulted for family and decisional support in complicated medical situation.     Today the patient was supinated in the morning. She desaturated to the 70s-80s and CXR while supine demonstrated stable pneumomediastinum from the day prior. It was felt that chest tube could be deferred, and she was re-proned due to the hypoxia. Currently her metabolic status remains tenuous, but her potassium is being adequately managed with diuretics and there remains no urgent need for dialysis.     Today, the patient was seen for:  COVID ARDS  TOI    Prognosis, Goals, or Advance Care Planning was addressed today with: No.  Discussed case with MICU team, ongoing monitoring but no major needs in terms of decisions.    Mood, coping, and/or meaning in the context of serious illness were addressed today: No.            Interval History:     Chart review/discussion with unit or clinical team members:   Patient developed pneumomediastinum yesterday, but was able to tolerate being supine for about 4 hours. She was started on large doses of lasix around the clock for potassium wasting and dialysis has continued to be deferred while her metabolic status holds.    Per patient or family/caregivers today:  Patient seen this morning during brief trial of being supine again for a CXR to monitor the pneumomediastinum. She was hypoxic to the mid-80s at the time. In discussion with the MICU team, the plan was to assess the air in the chest to make a decision regarding chest tube and re-prone due to the continued hypoxia. No plans to move forward with dialysis today.    Key Palliative Symptoms:   We are not helping to manage these symptoms currently in this patient.           Review of Systems:     Besides above, a complete 10+ ROS was unable to be obtained due to patient's mental status.          Medications:     I have reviewed this patient's medication profile and medications during this hospitalization.           Physical Exam:   Temp: (!) 96.2  F (35.7  C) (warm vbanketrs applied) Temp src: Axillary   Pulse: 83   Resp: (!) 32 SpO2: 94 % O2 Device: Mechanical Ventilator    Gen: Ill-appearing, supine.  HENT: No head trauma noted, intubated.  Resp: Hypoxic to 84-86%.  Msk: no gross deformity  Skin:  No jaundice, no breakdown noted on visible skin.  Neuro: Sedated.             Data Reviewed:     Reviewed recent labs and pertinent imaging  CXR:  1. Increased conspicuity of pulmonary opacities, consistent with  clinical history of COVID pneumonia. Left sided pleural effusion can't  be excluded due to overlying opacities.  2. Stable appearance of  pneumomediastinum and subcutaneous emphysema.

## 2021-11-03 NOTE — PROGRESS NOTES
MEDICAL ICU Progress Note  11/03/2021    Date of Hospital Admission: 10/23/21  Date of ICU Admission: 10/30/21  Reason for Critical Care Admission: Acute Hypoxic Respiratory failure due to Covid PNA  Date of Service (when I saw the patient): 11/03/2021    ASSESSMENT: Trinidad Gillette is a 31 year old female with PMH diabetes, bipolar disorder, obesity and drug abuse who was admitted on 10/26/21 to [facility] for covid PNA. She was intubated on 10/29/21 and transferred to the [facility] MICU for further management complicated by HAP (MRSA, Strep agalactiae & Moraxella) 11/1 and Pneumomediastinum 11/2.    Course:  10/22- COVID positive  10/26- Admitted w/COVID, Decadron x1 (unable to determine complete dosing at OSH)  10/27- Tocilizumab  10/29- Intubated  10/30- Transfer to -Proned, paralyzed, Epoprostenol, Decadron started for 10 day course  11/1- attempted to supinate with immediate decline ->proned      CHANGES and MAJOR THINGS TODAY:   - CXR  - Supinate  - EKG  - Lasix 120 mg IV every 6 hours x3 today per neph recommendations  - BMP every 6 hours x4 today  - Decrease TV to 320  - Increase RR to 32  - Lasix 120 mg IV every 6 hours x3  - Continue NaHCO3 infusion today    PLAN:    Neuro:  # Pain and sedation  - Midazolam gtt + prn  - Propofol gtt  - Dilaudid gtt + prn  - STOP pta gabapentin 800 mg TID 2/2 TOI  - RASS -4 to -5    # Chemical paralysis for vent synchrony  - Vecuronium drip  - Goal to be synchronous with the vent  - Follow BIS monitor for adequate sedation while paralyzed (goal 40-60)    # Retinal Detachment L eye  -Patient is blind in L eye and pupil is nonreactive to light  - Lacrilube every 8 hours 2/2 scleral edema    Pulmonary:  # Acute Hypoxic Respiratory Failure due to Covid PNA  # ARDS  # HAP (Strep agalac., MRSA, Moraxella) 11/1  # Pneumomediastinum 11/2  Symptom onset 10/21. Positive test on 10/22. Hospitalized on 10/26. Intubated on 10/29. Proned 10/30, attempted to supinate 11/1  with immediate decline (hypotensive, sats 37%, cardiac dysrhythmia) re-proned immediately. Pan cultured with new VAP 11/1. CXR 11/2 pneumomediastinum denoted. Supinated on 11/3 with decreased O2 sats (74-86%) -> repeat CXR 11/3 denotes stable pneumomediastinum, deferred chest tube placement at this time, reproned with O2 sats >93%, crepitus present in neck  - Dexamethasone 6mg x 10d (first dose on 10/27)  - Tocilizumab 10/27  - Duoneb q4h PRN  - Full strength veletri  - Attempted supination today with immediate desaturation to 37% -> immediately reproned.   - Remains paralyzed and sedated  - Abx per ID section below  - CXR 11/3 -> pneumomediastinum unchanged    Ventilation Mode: CMV/AC  (Continuous Mandatory Ventilation/ Assist Control)  FiO2 (%): 80 %  Rate Set (breaths/minute): 32 breaths/min  Tidal Volume Set (mL): 320 mL  PEEP (cm H2O): 10 cmH2O  Oxygen Concentration (%): 80 %  Peak Inspiratory Pressure (cm H2O) (Drager Bianca): 31  Resp: 30    Cardiovascular:  # Concern for septic shock with hypotension  Had elevated lactic acid on admission, resolved with fluid.  Hypotension could be related to sedation medications, sepsis and hypovolemia  - Norepi drip to keep MAP >65  - Continue to monitor  - Telemetry per ICU routine  - Troponin normal  - TTE when able to tolerate supination -> ordered  - Lactate 1.8    GI/Nutrition:  # Severe malnutrition in context of acute condition  # Morbid obesity  - Osmolite 1.5@ 45 ml/hr  - FWF 30 ml every 4 hours  - PPI  - Bowel Regimen scheduled and PRN  - Dietician consulted, appreciate recs    Renal/Fluids/Electrolytes:  # TOI  # metabolic acidosis  Unclear creatinine baseline.  - Creat 0.9 on admit here; now up to 3.42  - FENA 0.5 assumed pre-renal    50g 5% albumin (10/30)    Lactated ringer 500cc IV x1 (11/1)  - Trend BMP  - Nephrology consult  - Possible placement of dialysis line in anticipation of CRRT  - Renal ultrasound 11/2 neg for acute pathology  - Continue Lasix 120  mg IV every 6 hours x3 doses per nephrology (11/3)  - Continue NaHCO3 infusion per nephrology (11/3)    Endocrine:  # Poorly controlled T2DM  Hgb A1C 12.1 on admission  - Glargine 80u BID (home dose)  - Insulin drip   - Goal BG <180  - Hypoglycemic protocol    ID:  # Covid-19 PNA  -Decadron (10/27-  -Remdesivir deferred now that she is intubated  -Toci on 10/27  -Trend daily inflammatory markers x4 days  - CRP 98 (83)    # HAP (MRSA, Strep agalactiae, Moraxella catarrhalis)   Resp culture 10/30 positive MRSA, Strep agalactiae, Moraxella catarrhalis. De-escalated Zosyn to Ceftriaxone, Resp. Culture 11/1 with candida dubliniensis -> no active treatment  - continue Vanco  - stopped Zosyn  - Start Ceftriaxone  - Trend procal 7.20 (0.06)    Abx:   Zosyn 11/1-11/2  Vanco 11/1-  Ceftriaxone 11/2-    Cultures:    Blood culture 10/30 with GPC in 1 of 2 bottles, negative verigene    Sputum culture 10/30 (MRSA, strep agalactiae, Moraxella catarrhalis)    MRSA nasal swab 10/30  (MRSA)    Legionella 11/1 (neg)    Strep antigen 11/1 (neg)    Resp culture 11/1 Raiza dubliniensis    Hematology:    # Coagulopathy 2/2 COVID-19  D-dimer on admission >20  - Stop enoxaparin 2/2 TOI  - Start Heparin infusion (LIP)  - BLE ultrasound 11/2 neg for DVT    Musculoskeletal:  # Deconditioning and weakness in the setting of critical illness  - PT/OT consultation when appropriate    Skin:  # Left soft tissue triange facet pressure injury  - WOC consulted, appreciate recs  - Wound care per WOC recs    # Ecchymosis on left foot  - Pressure injury prevention strategies per ICU routine    General Cares/Prophylaxis:    DVT Prophylaxis: Enoxaparin (Lovenox) SQ  GI Prophylaxis: PPI  Restraints: None  Family Communication: Update Alanis hollis via telephone  Code Status: Full    Lines/tubes/drains:  - PIV  - PICC   - Arterial line    Disposition:  - Medical ICU     Patient seen and findings/plan discussed with medical ICU staff, Dr. Catie Payne  FRIEDA Alatorre, ROHAN    -----------------------------------------------------------------------  Overnight Events:  Nursing notes reviewed. Previously denoted crepitus now absent, concern for hypoxia with decreased PaO2 (69) per ABG after reduction of PEEP and TV with new pneumomediastinum.     PHYSICAL EXAMINATION:  Temp:  [97.7  F (36.5  C)-99.7  F (37.6  C)] 97.8  F (36.6  C)  Pulse:  [79-95] 81  Resp:  [30] 30  MAP:  [54 mmHg-294 mmHg] 95 mmHg  Arterial Line BP: ()/() 151/70  FiO2 (%):  [50 %-100 %] 80 %  SpO2:  [89 %-100 %] 96 %     General: ill-appearing, sedated, proned  HEENT: NCAT, L pupil irregular is nonreactive and dilated (chronic), ETT right-sedated, Crepitus in right side of neck and shoulder, scleral edema  Neuro: Sedated and paralyzed, no spontaneous movement noted  Pulm/Resp: Breath diminished upper doses, crackles mid-lower bases, mechanically assisted   CV: RRR, S1/S2, no murmurs, rubs, gallops, appreciable edema  Abdomen: not assessed in prone position  : hart catheter in place, urine yellow and clear with sediment  Incisions/Skin: scab/necrotic area on nose, skin graft on R thigh, healed incision in lower spine, ecchymosis on cheeks    LABS: Reviewed.   Arterial Blood Gases   Recent Labs   Lab 11/03/21  0342 11/02/21  2204 11/02/21  1650 11/02/21  1455   PH 7.33* 7.29* 7.30* 7.28*   PCO2 48* 48* 44 45   PO2 64* 76* 80 71*   HCO3 25 23 22 21     Complete Blood Count   Recent Labs   Lab 11/03/21  0342 11/02/21  0325 11/01/21  1335 11/01/21  0422   WBC 9.7 9.7 11.6* 12.1*   HGB 7.6* 7.7* 7.7* 8.0*    283 290 324     Basic Metabolic Panel  Recent Labs   Lab 11/03/21  0634 11/03/21  0503 11/03/21  0428 11/03/21  0342 11/02/21  2205 11/02/21  2204 11/02/21  1614 11/02/21  1610 11/02/21  0959 11/02/21  0957   NA  --   --   --  143  --  142  --  141  --  143   POTASSIUM  --   --   --  4.9  --  4.9  --  5.7*  --  5.2   CHLORIDE  --   --   --  111*  --  112*  --  112*  --  112*   CO2   --   --   --  24  --  24  --  22  --  21   BUN  --   --   --  79*  --  76*  --  72*  --  66*   CR  --   --   --  3.11*  --  3.30*  --  3.43*  --  3.60*   * 131* 139* 139*   < > 141*   < > 198*   < > 143*    < > = values in this interval not displayed.     Liver Function Tests  Recent Labs   Lab 11/01/21  1145 10/30/21  0703   AST 31  --    ALT 20 32   ALKPHOS 82 85   BILITOTAL 0.4 0.3   ALBUMIN 1.7* 1.8*   INR  --  0.99     Coagulation Profile  Recent Labs   Lab 10/30/21  0703   INR 0.99   PTT 25       IMAGING:  Recent Results (from the past 24 hour(s))   XR Chest Port 1 View   Result Value    Radiologist flags As above (Urgent)    Narrative    EXAMINATION:  XR CHEST PORT 1 VIEW 11/2/2021 12:14 PM.    COMPARISON: 10/30/2021.    HISTORY:  Hx: COVID-19 ARDS, eval interval: opacities, infiltrates,  effusions, lines, ETT    FINDINGS: Portable AP view of the chest. Endotracheal tube tip  projects over the mid trachea. Gastric tube tip is not visualized. The  cardiomediastinal silhouette is within normal limits. There is no  significant change in the diffuse patchy pulmonary opacities.  Increased dense retrocardiac opacity. No pleural effusion or  pneumothorax. There is new extensive subcutaneous emphysema is seen  within the right chest wall and in bilateral lower neck soft tissues.  There is pneumomediastinum. The visualized upper abdomen is  unremarkable. No acute osseous lesion.         Impression    IMPRESSION:   1. Slightly increased patchy pulmonary opacities in the retrocardiac  region.  2. New pneumomediastinum with subcutaneous emphysema in the right  chest wall and bilateral visualized neck bases. Barotrauma may cause  these findings.     [Urgent Result: As above]    Finding was identified on 11/2/2021 2:59 PM.     Dr. Long was contacted by Dr. Arellano at 11/2/2021 3:02 PM and  verbalized understanding of the urgent finding.      I have personally reviewed the examination and initial  interpretation  and I agree with the findings.    MATT MEDLEY MD         SYSTEM ID:  [id number]   US Renal Complete    Narrative    EXAMINATION: US RENAL COMPLETE, 11/2/2021 1:48 PM     COMPARISON: None.    HISTORY: COVID-19 ARDS with TOI.    TECHNIQUE: The kidneys and bladder were scanned in the standard  fashion with specialized ultrasound transducer(s) using both gray  scale and limited color/spectral Doppler techniques.    FINDINGS:    Right kidney: Measures 14.5 cm in length. Parenchyma is of normal  thickness and echogenicity. No focal mass. No hydronephrosis.    Left kidney: Measures 14 cm in length. Parenchyma is of normal  thickness and echogenicity. No focal mass. No hydronephrosis.     Bladder: Nondistended with Ocampo catheter.      Impression    IMPRESSION: Normal renal ultrasound.    I have personally reviewed the examination and initial interpretation  and I agree with the findings.    EDWIN RODGERS MD         SYSTEM ID:  [id number]   US Lower Extremity Venous Bilateral Port    Narrative    EXAMINATION: DOPPLER VENOUS ULTRASOUND OF BILATERAL LOWER EXTREMITIES,  11/2/2021 1:48 PM     COMPARISON: None.    HISTORY: COVID-19 ARDS, elevated D-dimer/coagulable state, concern for  DVT     TECHNIQUE:  Gray-scale evaluation with compression, spectral flow and  color Doppler assessment of the deep venous system of both legs from  groin to knee, and then at the ankles.    FINDINGS:  In both lower extremities, the common femoral, femoral, popliteal and  posterior tibial veins demonstrate normal compressibility and blood  flow.      Impression    IMPRESSION:    No evidence of deep venous thrombosis in either lower extremity.    I have personally reviewed the examination and initial interpretation  and I agree with the findings.    ROSA GUTIÉRREZ MD         SYSTEM ID:  [id number]   Echo Complete   Result Value    LVEF  60-65%    Narrative    [id number]  [id number]  [id number]  [id number]^JOCELYN^SRAVANI^[facility]  [facility],[place]  Echocardiography Laboratory  [address] [place] [address]     Name: PAUL BEJARANO  MRN: [id number]  : 1990  Study Date: 2021 02:17 PM  Age: 31 yrs  Gender: Female  Patient Location: [facility] [place]  Reason For Study: Dyspnea  Ordering Physician: SRAVANI BANKS  Referring Physician: SYSTEM, PROVIDER NOT IN  Performed By: Edward Joyce     BSA: 2.2 m2  Height: 66 in  Weight: 254 lb  HR: 85  BP: 94/41 mmHg  ______________________________________________________________________________  Procedure  Complete Portable Echo Adult. Echocardiogram with two-dimensional, color and  spectral Doppler performed. Optison (NDC #4603-8573-14) given intravenously.  Patient was given 9 ml mixture of 3 ml Optison and 6 ml saline. 0 ml wasted.  ______________________________________________________________________________  Interpretation Summary  Global and regional left ventricular function is normal with an EF of 60-65%.  Global right ventricular function is mildly reduced.  No significant valvular abnormalities present.  Unable to assess mean RA pressure given the patient is on a ventilator.  No pericardial effusion is present.  ______________________________________________________________________________  Left Ventricle  Left ventricular size is normal. Global and regional left ventricular function  is normal with an EF of 60-65%. Mild concentric wall thickening consistent  with left ventricular hypertrophy is present. No regional wall motion  abnormalities are seen.     Right Ventricle  Mild right ventricular dilation is present. Global right ventricular function  is mildly reduced.     Atria  The right atria appears normal. Moderate left atrial enlargement is present.     Mitral Valve  The mitral valve is normal.     Aortic Valve  Aortic valve is normal in structure and function.     Tricuspid Valve  The tricuspid valve is normal. The peak velocity of the tricuspid  regurgitant  jet is not obtainable. Pulmonary artery systolic pressure cannot be assessed.     Pulmonic Valve  The pulmonic valve is normal.     Vessels  The aorta root is normal. Dilation of the inferior vena cava is present with  abnormal respiratory variation in diameter. Unable to assess mean RA pressure  given the patient is on a ventilator.     Pericardium  No pericardial effusion is present.     Miscellaneous  No significant valvular abnormalities present.     Compared to Previous Study  There is no prior study for direct comparison.  ______________________________________________________________________________  MMode/2D Measurements & Calculations  IVSd: 1.5 cm  LVIDd: 4.5 cm  LVIDs: 2.3 cm  LVPWd: 1.4 cm  FS: 49.2 %  LV mass(C)d: 252.6 grams  LV mass(C)dI: 114.1 grams/m2  Ao root diam: 2.9 cm  asc Aorta Diam: 3.2 cm  LVOT diam: 2.0 cm  LVOT area: 3.1 cm2  RWT: 0.60     Doppler Measurements & Calculations  MV E max kelvin: 78.3 cm/sec  MV A max kelvin: 65.4 cm/sec  MV E/A: 1.2  MV dec slope: 412.0 cm/sec2  MV dec time: 0.19 sec  E/E' av.7  Lateral E/e': 5.2     Medial E/e': 8.2     ______________________________________________________________________________  Report approved by: Gulshan Salinas 2021 03:31 PM

## 2021-11-03 NOTE — PROGRESS NOTES
Nephrology Progress Note  11/03/2021         Trinidad Gillette is a 31 yof with hx of DM2, bipolar disorder, Obesity and Polysubstance abuse who is admitted with severe COVID, intubated on 10/30 and very unstable requiring proning.  Nephrology consulted for rapidly rising Cr and increasing K.       Interval History :   Mrs Gillette' Cr is a bit better today and UOP also is increasing suggesting she is getting reasonable renal blood flow currently.  K more reasonable today at 4.9, can continue high dose lasix to manage volume and K.  Still at risk for needing RRT but would hold off for today particularly given her instability lying supine in order to place line.  UOP has improved dramatically and creatinine trend down has continued.      Assessment & Recommendations:   TOI-Baseline Cr normal at 0.9, has long hx of UTI's, renal US ordered.  Cr up sharply in the 48h prior to consult in setting of sepsis with fevers, likely high inflammatory state.  The risk of placing line is higher than usual due to her intolerance of even short time supine, currently proned.  We are taking the approach of checking labs q6h and would offer CRRT if K is high, eventually may be forced to consider for volume.  I did call and have long conversation with Pt's sister Caroline and she is discussing with other family members whether Mrs Gillette would want RRT as part of her care when indicated although Cr has leveled out and is making some UOP.                   -TOI, likely hypoperfusion and inflammation from COVID sepsis. Cr is a bit improved today and UOP is increasing on high dose lasix, will continue with goal of avoiding line for now as it comes with significant risk with instability with being prone.                  -No HD access currently.                 Creatinine is improving and UOP is responding aggressively to the lasix. I am hopeful we are in the early stages of recovery although she is tenuous and any changes in status could  "reinjure.    Volume-About net even with 120mg lasix x3 yesterday, can use similar approach today, is already nearly 2L net negative.  She is volume overloaded but very aggressive diuresis can generate other issues. Consider decreasing to BID dosing of lasix.      Electrolytes/pH-K 4.9 on last check, bicarb 24.  ABG 7.36/51/61/29, both oxygenation and ventilation are a bit improved this am.  Started on bicarb yesterday with improvement in acid base status. Volume has not been issue (see above)     BMD-Ca 7.6, Mg and Phos reasonable.       COVID-Unvaccinated (Not listed in vaccinations in outpt record from 10/11/2021), on dexamethasone, received tocilizumab x2 days.  On enoxaparin for DVT prophylaxis.       Anemia-Hgb 7.6 may be dilutional from ~11 earlier in course with volume expansion, acute management per team.       Seen and discussed with Dr Ramey     Recommendations were communicated to primary team via verbal communication.        FRIEDA Agarwal CNS  Clinical Nurse Specialist  [phone]  Attestation:  This patient has been seen, examined and evaluated by me, Sandra Ramey MD as a shared visit with the NP/PA above.     In brief:  Trinidad Gillette is a 31 year old female with COVID and TOI.     I personally reviewed major complaints, physical findings, investigations, medications, lab values, vital signs, I/O's and the overall management plan.      My key findings and Decisions are noted on bold.         Sandra Ramey MD MS  Date of service (date I saw patient) 3 nov 2021    Review of Systems:   I reviewed the following systems:  ROS not done due to vent/sedation.    Physical Exam:   I/O last 3 completed shifts:  In: 4429.67 [I.V.:3114.67; NG/GT:595]  Out: 5525 [Urine:5400; Stool:125]   /60 (BP Location: Left arm)   Pulse 83   Temp (!) 96.2  F (35.7  C) (Axillary)   Resp (!) 32   Ht 1.676 m (5' 6\")   Wt 115.3 kg (254 lb 3.1 oz)   SpO2 93%   BMI 41.03 kg/m       PE not done due to " +COVID status but seen from hallway through door and discussed PE with primary RN and team.      Labs:   All labs reviewed by me  Electrolytes/Renal - Recent Labs   Lab Test 11/03/21  1405 11/03/21  1259 11/03/21  1152 11/03/21  0428 11/03/21 0342 11/02/21 2205 11/02/21  2204 11/02/21  1614 11/02/21  1610 11/01/21  0504 11/01/21  0422 10/31/21  2159 10/31/21  2157 10/31/21  0331 10/31/21  0050   NA  --   --   --   --  143  --  142  --  141   < > 140   < > 142   < > 141   POTASSIUM  --   --   --   --  4.9  --  4.9  --  5.7*   < > 5.1   < > 4.8   < > 4.7   CHLORIDE  --   --   --   --  111*  --  112*  --  112*   < > 113*   < > 116*   < > 114*   CO2  --   --   --   --  24  --  24  --  22   < > 20   < > 20   < > 20   BUN  --   --   --   --  79*  --  76*  --  72*   < > 49*   < > 47*   < > 36*   CR  --   --   --   --  3.11*  --  3.30*  --  3.43*   < > 3.42*   < > 3.08*   < > 2.15*   * 92 113*   < > 139*   < > 141*   < > 198*   < > 270*   < > 200*   < > 300*   ARMAAN  --   --   --   --  7.6*  --  7.6*  --  7.3*   < > 7.2*   < > 7.4*   < > 7.3*   MAG  --   --   --   --   --   --   --   --   --   --  2.3  --  2.4*  --  2.3   PHOS  --   --   --   --   --   --   --   --   --   --  4.6*  --  3.4  --  5.2*    < > = values in this interval not displayed.       CBC -   Recent Labs   Lab Test 11/03/21 0342 11/02/21  0325 11/01/21  1335   WBC 9.7 9.7 11.6*   HGB 7.6* 7.7* 7.7*    283 290       LFTs -   Recent Labs   Lab Test 11/01/21  1145 10/30/21  0703   ALKPHOS 82 85   BILITOTAL 0.4 0.3   ALT 20 32   AST 31  --    PROTTOTAL 5.6* 7.1   ALBUMIN 1.7* 1.8*       Iron Panel - No lab results found.        Current Medications:    albuterol  2.5 mg Nebulization Q4H     cefTRIAXone  2 g Intravenous Q24H     dexamethasone  6 mg Intravenous Daily     furosemide  120 mg Intravenous Q6H     [Held by provider] gabapentin  300 mg Oral Q8H ANJELICA     HYDROmorphone  4 mg Oral or Feeding Tube Q4H     insulin glargine  80 Units  Subcutaneous BID     LORazepam  4 mg Oral or Feeding Tube Q4H     LubriFresh P.M.  1 Application Both Eyes Q8H     multivitamins w/minerals  15 mL Per Feeding Tube Daily     pantoprazole  40 mg Per Feeding Tube QAM AC     polyethylene glycol  17 g Oral Daily     protein modular  2 packet Per Feeding Tube TID     senna-docusate  1 tablet Oral BID    Or     senna-docusate  2 tablet Oral BID     vancomycin place whyte - receiving intermittent dosing  1 each Intravenous See Admin Instructions       dextrose       dextrose       epoprostenol (VELETRI) 20 mcg/mL in sterile water inhalation solution 20 ng/kg/min (11/03/21 1213)     heparin 1,750 Units/hr (11/03/21 1411)     HYDROmorphone 2 mg/hr (11/03/21 1400)     insulin regular 7 Units/hr (11/03/21 1400)     midazolam 13 mg/hr (11/03/21 1400)     norepinephrine Stopped (11/01/21 1421)     propofol (DIPRIVAN) infusion 30 mcg/kg/min (11/03/21 1400)     sodium bicabonate in water for infusion 50 mL/hr at 11/03/21 0816     vecuronium (NORCURON) infusion ADULT 1 mcg/kg/min (11/03/21 1400)

## 2021-11-03 NOTE — PLAN OF CARE
ICU End of Shift Summary. See flowsheets for vital signs and detailed assessment.    Changes this shift: Supinated this morning at 1028, SPO2<84 on 100% FIO2 stat CXR ordered and performed, per team no chest tubes needed at this time. Re-prone at 1120 w/ improvement in Spo2,now on 85% FIO2 with P/F 76.  Continues to drop SPO2  to mid-high 80's w/ head turns w/a slow recovery. Aggressive diuresing continues current net -2.4 L since MN, but +6.6L since admission.  Heparin drip continues to be subtherapeutic infusing at 1750 units/h currently.  Pt  remains medically sedated and paralyzed.  Family updated by team and via Ipad throughout the day. Respiratory status remains tenuous.    Plan:   Heparin Xa pending, BMP due at 2000, titrated FIO2 as tolerated and continue w/ goals of care.  Problem: ARDS (Acute Respiratory Distress Syndrome)  Goal: Effective Oxygenation  Outcome: No Change    Problem: Gas Exchange Impaired  Goal: Optimal Gas Exchange  Outcome: No Change

## 2021-11-03 NOTE — PLAN OF CARE
Major Shift Events: Pt remained proned. RASS -5, unarousable. No change to neuro status, BIS 15-35. Sinus rhythm, afebrile. MAP goal >65 maintained. LS wheezy, diminished. CMV settings 70%/360/10/30, desats with head turns. PF 91. Loose, watery stool. Continued with diruesis challenge, Lasix 120 mg q6h, with adequate UOP approx. 400-550/hr. +8.4L since admission. K+ 4.9 @ 0000 & 0400. Vanco given, okay per MICU. Vec @ 1, Versed @ 13, Dilaudid @ 2, Prop @ 30, Bicarb 150 IV fluid @ 50, Insulin @ 4u/hr on Algorithm 4. BG mainatined between . Hep Xa 0.20, 0.22,0.19 - rate @ 1600. Recheck @ 1215.     Plan: Continue to monitor respiratory status and adjust vent as needed.     For vital signs and complete assessments, please see documentation flowsheets.    Problem: ARDS (Acute Respiratory Distress Syndrome)  Goal: Effective Oxygenation  Outcome: No Change     Problem: Gas Exchange Impaired  Goal: Optimal Gas Exchange  Outcome: No Change  Intervention: Optimize Oxygenation and Ventilation  Recent Flowsheet Documentation  Taken 11/3/2021 0400 by Noelle Landin RN  Head of Bed (HOB) Positioning: (reverse trendelenberg) other (see comments)  Taken 11/3/2021 0200 by Noelle Landin RN  Head of Bed (HOB) Positioning: (reverse trend) --  Taken 11/3/2021 0000 by Noelle Landin RN  Head of Bed (HOB) Positioning: (rev trend) other (see comments)  Taken 11/2/2021 2200 by Noelle Landin RN  Head of Bed ([facility]) Positioning: (rev trend) other (see comments)  Taken 11/2/2021 2000 by Noelle Landin RN  Head of Bed ([facility]) Positioning: (Reverse trend) other (see comments)

## 2021-11-04 NOTE — PROGRESS NOTES
Pt is too hemodynamically unstable to do head turns Q2H right now.   Pt SpO2: 86% on 100% FiO2, PEEP of 10+, on Veletri.   RT will keep assessing pts face and ETT to make sure all is secure and no pressure injuries.     Will continue to monitor and provide support.     Courtney Jang, RT

## 2021-11-04 NOTE — PLAN OF CARE
Major Shift Events: PT remains paralyzed and sedated with a  RASS score of -5. No changes to neurological status. Tmax 99.8, 99.1, afebrile, slightly tachycardic with head turns. CMV settings remain 320/32/10/85%. FiO2 titrated post head turns to maintain O2 above 88%. Per MD, okay to do head turns q4h. Pt able to tolerate q2-3h but takes time to increase O2 saturations. Continued with aggressive diureses, pt still up 6.5L since admission. Cr trending down, AM draw 2.15. TF remain at goal of 30 with standard flushes. Loose, watery stool via rectal tube. Various bruising, peeling and cracked skin. BG labile, currently at 4u on Alg 4. Hep Xa remains subtherapeutic, protocol followed. Calcium @ 8.0 & Phos @ 6.2, MD aware.     Drips: Prop @ 30, Versed @ 13, Vec @ 1, Heparin @ 2050, Bicarb in sterile water @ 50, Insulin @ 4,Dilaudid @ 2.     Plan: Continue to monitor respiratory status and kidney function. Continue to diurese.     For vital signs and complete assessments, please see documentation flowsheets.    Problem: ARDS (Acute Respiratory Distress Syndrome)  Goal: Effective Oxygenation  Outcome: No Change     Problem: Gas Exchange Impaired  Goal: Optimal Gas Exchange  Outcome: No Change  Intervention: Optimize Oxygenation and Ventilation  Recent Flowsheet Documentation  Taken 11/4/2021 0400 by Noelle Landin RN  Head of Bed (\[facility]\"") Positioning: (reverse trend) other (see comments)  Taken 11/4/2021 0200 by Noelle Landin RN  Head of Bed (\[facility]\"") Positioning: (reverse trend) other (see comments)  Taken 11/4/2021 0000 by Noelle Landin RN  Head of Bed (\[facility]\"") Positioning: (reverse trend) other (see comments)  Taken 11/3/2021 2300 by Noelle Landin RN  Head of Bed (\[facility]\"") Positioning: (reverse trend) other (see comments)  Taken 11/3/2021 2200 by Noelle Landin RN  Head of Bed (\[facility]\"") Positioning: (reverse trend) other (see comments)  Taken 11/3/2021 2000 by Noelle Landin RN  Head of Bed (\[facility]\"") Positioning: (reverse  elizabeth) other (see comments)

## 2021-11-04 NOTE — PROGRESS NOTES
Pt has become more hemodynamically stable, so RT & RN are able to complete a head turn.     Will continue to monitor and provide support.     Courtney Jang, RT

## 2021-11-04 NOTE — PLAN OF CARE
ICU End of Shift Summary. See flowsheets for vital signs and detailed assessment.    Changes this shift:  Remains prone medically sedated & paralyzed over breathing the vent by 5-10 breaths occasionally, prn's boluses given and versed & propofol drips increased, BIS 44-55. On full strength veletri, PEEP 10 with FIO2 100%, SPO2 79-90% most of the shift, from 1347-2575 SPO2 79-85% and since 1600 SpO2 88-93%.  Not tolerating head turns too unstable, micro head turns done. Temp high 100.8 axillary Tylenol 975 mg given twice, temp low 99.5 axillary attempted peripheral BC, but unsuccessful BC sent from [place]. Heparin drip continues to be subtherapeutic currently heparin 2350 unit /hr. Family updated at BS will continue w/ goal of care, pt's father arrivals tomorrow from [place] per family. Respiratory status remains tenuous.     Plan:   Heparin Xa due at 2215, continue to monitor respiratory status closely and notify the team of any changes.      Problem: ARDS (Acute Respiratory Distress Syndrome)  Goal: Effective Oxygenation  Outcome: Declining      Problem: Gas Exchange Impaired  Goal: Optimal Gas Exchange  Outcome: Declining

## 2021-11-04 NOTE — CONSULTS
VV-ECMO Consult Note    Trinidad Gillette is a 31 year old female with PMH diabetes, bipolar disorder, obesity and drug abuse who was admitted on 10/26/21 to [facility] for covid PNA. She was intubated on 10/29/21 and transferred to the [facility] MICU for further management complicated by HAP (MRSA, Strep agalactiae & Moraxella) 11/1 and Pneumomediastinum 11/2.    She has been intubated and ventilated for the last 7 days, with worsened p/f ratios despite paralysis and proning and nebulized Epoprostenol. She also developed septic shock with TOI which appears to be improving,. Patient is DNR as discussed with her family by the primary team.    Significant co-morbidities:  - Obesity with BMI > 40   - Uncontrolled DM    Based on her co-morbidities the patient is not a good candidate for VV-ECMO.     Case discussed with Dr. Whitman, VV-ECMO staff

## 2021-11-04 NOTE — PHARMACY-VANCOMYCIN DOSING SERVICE
Pharmacy Vancomycin Note  Date of Service 2021  Patient's  1990   31 year old, female    Indication: Sepsis  Day of Therapy: 5   Current vancomycin regimen:  Intermittent due to TOI   Current vancomycin monitoring method: Trough (Method 2 = manual dose calculation)  Current vancomycin therapeutic monitoring goal: 15-20 mg/L    Current estimated CrCl = Estimated Creatinine Clearance: 48.5 mL/min (A) (based on SCr of 2.15 mg/dL (H)).-- likely CrCl significantly less     Creatinine for last 3 days  2021:  5:45 PM Creatinine 3.40 mg/dL;  8:57 PM Creatinine 3.45 mg/dL  2021:  3:25 AM Creatinine 3.41 mg/dL;  9:57 AM Creatinine 3.60 mg/dL;  4:10 PM Creatinine 3.43 mg/dL; 10:04 PM Creatinine 3.30 mg/dL  11/3/2021:  3:42 AM Creatinine 3.11 mg/dL;  8:25 PM Creatinine 2.48 mg/dL  2021:  3:57 AM Creatinine 2.15 mg/dL    Recent Vancomycin Levels (past 3 days)  2021:  3:57 AM Vancomycin 22.8 mg/L    Vancomycin IV Administrations (past 72 hours)                   vancomycin (VANCOCIN) 1000 mg in dextrose 5% 200 mL PREMIX (mg) 1,000 mg New Bag 21 1037    vancomycin (VANCOCIN) 1000 mg in dextrose 5% 200 mL PREMIX (mg) 1,000 mg New Bag 21 0526                Nephrotoxins and other renal medications (From now, onward)    Start     Dose/Rate Route Frequency Ordered Stop    21 1000  furosemide (LASIX) injection 80 mg      80 mg  over 1-3 Minutes Intravenous EVERY 6 HOURS 21 0941 21 2159    10/31/21 1012  vancomycin place whyte - receiving intermittent dosing      1 each Intravenous SEE ADMIN INSTRUCTIONS 10/31/21 1013      10/30/21 1100  norepinephrine (LEVOPHED) 16 mg in  mL infusion MAX CONC CENTRAL LINE      0.01-0.6 mcg/kg/min × 114.8 kg (Dosing Weight)  1.1-64.6 mL/hr  Intravenous CONTINUOUS 10/30/21 1039               Contrast Orders - past 72 hours (72h ago, onward)    Start     Dose/Rate Route Frequency Ordered Stop    21 1500  perflutren diluted  1mL to 2mL with saline (OPTISON) diluted injection 4 mL      4 mL Intravenous ONCE 11/02/21 1448 11/02/21 1448          Interpretation of levels and current regimen:  Vancomycin level is reflective of therapeutic level  Urine output:  Decreasing Scr, UOP good, but not clearing well   Renal Function: Worsening/stable     Plan:  1. Will dose vancomycin 1000mg (8.7 mg/kg) IV x1 @ 1100 today;   2. Vancomycin monitoring method: Trough (Method 2 = manual dose calculation)  3. Vancomycin therapeutic monitoring goal: 15-20 mg/L  4. Pharmacy will check vancomycin levels as appropriate in 1-3 Days.  5. Serum creatinine levels will be ordered daily.    Elizabeth Thompson, PharmD  [facility] Pharmacist  [phone]  [phone]

## 2021-11-05 NOTE — PLAN OF CARE
Major Shift Events: Pt remains paralyzed and sedated, no change in neuro status. Afebrile, sinus rhythm to sinus tach, 90s-low 100s. Maintained MAP goal >65. Respiratory status remains tenous, FiO2 down to 85% from 100%, tolerated 2 head turns with minimal drop in respiratory status. CMV settings remain 85%/320/32/10. Continues to have thick, creamy secretions. Rectal tube in place with watery, loose output. Ocampo in place with approximately 200 out q2h. BG remained between 100-160, currently on 3u, algorithm 4. Creatinine continuing to trend down at 1.97, K+ 3.8, Hep Xa @ 0.30, therapeutic. Sister Alanis on Ipad throughout shift, dad coming in from [place] today to discuss plan of care with Alanis.     Plan: Continue to monitor respiratory status and discuss plan of care/goals with family.    For vital signs and complete assessments, please see documentation flowsheets.    Problem: ARDS (Acute Respiratory Distress Syndrome)  Goal: Effective Oxygenation  Outcome: No Change     Problem: Gas Exchange Impaired  Goal: Optimal Gas Exchange  Outcome: No Change  Intervention: Optimize Oxygenation and Ventilation  Recent Flowsheet Documentation  Taken 11/5/2021 0400 by Noelle Landin RN  Head of Bed (HOB) Positioning: (reverse trend) other (see comments)  Taken 11/5/2021 0200 by Noelle Landin RN  Head of Bed (HOB) Positioning: (reverse trend) other (see comments)  Taken 11/5/2021 0000 by Noelle Landin RN  Head of Bed (HOB) Positioning: (reverse trend) other (see comments)  Taken 11/4/2021 2200 by Noelle Landin RN  Head of Bed (HOB) Positioning: (reverse trend) other (see comments)  Taken 11/4/2021 2000 by Noelle Landin RN  Head of Bed ([facility]) Positioning: (Reverse trend) other (see comments)

## 2021-11-05 NOTE — PROGRESS NOTES
CLINICAL NUTRITION SERVICES - REASSESSMENT NOTE     Nutrition Prescription    Malnutrition Status:    Unable to determine due to unable to determine all aspects for NFPE    Recommendations already ordered by Registered Dietitian (RD):  --Continue TF as ordered via OGT: Novasource Renal @ 30 ml/hr (720 ml) + 6 Prosource provides 1680 kcal (14.5 kcal/kg), 131 g pro (2.2 g/kg IBW), 132 g CHO, 516 ml free water, and 0 g fiber daily.     Future/Additional Recommendations:  --Ongoing TF tolerance via OGT.  --Renal function. Ability to obtain new weight.  --GOC/POC.      EVALUATION OF THE PROGRESS TOWARD GOALS   Diet: NPO  Nutrition Support:   10/30-11/1: Osmolite 1.5 @ 45 ml/hr = 1620 kcals (22 kcal/kg), 67 g PRO (0.9 g/kg), 822 mL H2O, 219 g CHO, and 0 g fiber daily + 4 Prosource = 1780 kcal, 111 g PRO     11/1 - ___ : Novasource Renal @ 30 ml/hr (720 ml) + 6 Prosource provides 1680 kcal (14.5 kcal/kg), 131 g pro (2.2 g/kg IBW), 132 g CHO, 516 ml free water, and 0 g fiber daily.   Intake: ~100% of TF intake x 6 days     NEW FINDINGS   Weight: no recent weight, unable to obtain per flowsheets  Labs:  (H), Cr 1.97 (H), Phos 6.2 (H)  Meds: decadron, lasix, dilaudid, medium sliding scale insulin, lantus 80 units BID, certavite, miralax (not given), prosource 2 packets TID, senna-docusate BID, versed, propofol, vecuronium   GI: 0-800 ml stool output/day  Renal: TOI  Resp: COVID+, intubated; not a VV ECMO candidate  Skin: WOCN 11/2:  Pressure Injury: on Left soft tissue triange facet , hospital acquired ,   This is a Medical Device Related Pressure Injury (MDRPI) due to NG  Pressure Injury is Stage Deep Tissue Pressure Injury (DTPI)   Contributing factor of the pressure injury: pressure and immobility  Status: initial assessment    MALNUTRITION  % Intake: Decreased intake does not meet criteria  % Weight Loss: Unable to assess  Subcutaneous Fat Loss: Unable to assess COVID+, prone  Muscle Loss: Unable to assess COVID+,  prone  Fluid Accumulation/Edema: Moderate 3+ edema  Malnutrition Diagnosis: Unable to determine due to unable to determine all aspects for NFPE    Previous Goals   Total avg nutritional intake to meet a minimum of 20 kcal/kg and 1.5 gm PRO/kg daily (per dosing wt 73 kg adjusted ).  Evaluation: suspect Met    Previous Nutrition Diagnosis  Inadequate oral intake related to ARDS with mechanical ventilation inhibiting ability to take nutrition PO as evidenced by NPO status x at least 1 day since admit, requiring the start of enteral nutrition to meet 100% of needs.     Evaluation: No change    CURRENT NUTRITION DIAGNOSIS  Inadequate oral intake related to ARDS with mechanical ventilation inhibiting ability to take nutrition PO as evidenced by NPO status requiring the start of enteral nutrition to meet 100% of needs.       INTERVENTIONS  Implementation  Collaboration with other providers  Enteral Nutrition - continue as ordered    Goals  Total avg nutritional intake to meet a minimum of 11 kcal/kg and 2 g PRO/kg daily (per dosing wt 115.3 kg for energy needs and 59 kg IBW for protein needs).    Monitoring/Evaluation  Progress toward goals will be monitored and evaluated per protocol.    Rossy Marlow, MS, RD, LD, [facility] pager: [phone]  ASCOM: [phone]

## 2021-11-05 NOTE — PHARMACY-VANCOMYCIN DOSING SERVICE
Pharmacy Vancomycin Note  Date of Service 2021  Patient's  1990   31 year old, female    Indication: Sepsis  Day of Therapy: 6  Current vancomycin regimen:  Intermittent due to TOI; last dose 1000mg (8.7mg/kg)  @ 1057  Current vancomycin monitoring method: Trough (Method 2 = manual dose calculation)  Current vancomycin therapeutic monitoring goal: 15-20 mg/L    Current estimated CrCl = Estimated Creatinine Clearance: 52.9 mL/min (A) (based on SCr of 1.97 mg/dL (H)).    Creatinine for last 3 days  2021:  4:10 PM Creatinine 3.43 mg/dL; 10:04 PM Creatinine 3.30 mg/dL  11/3/2021:  3:42 AM Creatinine 3.11 mg/dL;  8:25 PM Creatinine 2.48 mg/dL  2021:  3:57 AM Creatinine 2.15 mg/dL  2021:  3:59 AM Creatinine 1.97 mg/dL;  3:59 AM Creatinine 1.97 mg/dL    Recent Vancomycin Levels (past 3 days)  2021:  2:54 PM Vancomycin 23.3 mg/L  2021:  3:57 AM Vancomycin 22.8 mg/L  2021: 11:59 AM Vancomycin 31.4 mg/L    Vancomycin IV Administrations (past 72 hours)                   vancomycin (VANCOCIN) 1000 mg in dextrose 5% 200 mL PREMIX (mg) 1,000 mg New Bag 21 1037    vancomycin (VANCOCIN) 1000 mg in dextrose 5% 200 mL PREMIX (mg) 1,000 mg New Bag 21 0526                Nephrotoxins and other renal medications (From now, onward)    Start     Dose/Rate Route Frequency Ordered Stop    21 1000  furosemide (LASIX) injection 120 mg      120 mg  over 1-3 Minutes Intravenous EVERY 6 HOURS 21 0930 21 2159    10/31/21 1012  vancomycin place whyte - receiving intermittent dosing      1 each Intravenous SEE ADMIN INSTRUCTIONS 10/31/21 1013               Contrast Orders - past 72 hours (72h ago, onward)    Start     Dose/Rate Route Frequency Ordered Stop    21 1500  perflutren diluted 1mL to 2mL with saline (OPTISON) diluted injection 4 mL      4 mL Intravenous ONCE 21 1448 21 1448          Interpretation of levels and current  regimen:  Vancomycin level is reflective of supratherapeutic level  Urine output:  good urine output; likely not clearing   Renal Function: Worsening based on elevated vancomycin level     Plan:  1. Will hold dosing vancomycin today  2. Vancomycin monitoring method: Trough (Method 2 = manual dose calculation)  3. Vancomycin therapeutic monitoring goal: 15-20 mg/L  4. Pharmacy will check vancomycin levels as appropriate in will check level in 24-48 hours based on renal function and clinical changes .  5. Serum creatinine levels will be ordered daily.    Elizabeth Thompson PharmD  [facility] Pharmacist  [phone]  [phone]

## 2021-11-05 NOTE — CONSULTS
SPIRITUAL HEALTH SERVICES  PALLIATIVE SPIRITUAL ASSESSMENT   [facility] ([place]) 4C    PRIMARY FOCUS:     Goals of care    Emotional/spiritual distress    Support for coping    Telephone conversation with patient Trinidad Gillette's sister Alanis, including prayer for Trinidad at Alanis's request. Family is gathering locally, and if Trinidad transitions to comfort measures only this weekend, they would like  support and prayer (Confucianist Sabianism).    Distress: Family are grieving Trinidad's condition and fearful that she will die. At the same time, they want her to be comfortable if she is dying, and they are aware of the suffering she has endured in the last several years.    Coping/Meaning Making: Family's love for Trinidad is a driving force for them as they make decisions for her. They draw on Sabianism artemio for ritual and comfort.     Intervention: Reviewed documentation. Offered spiritual and emotional support through listening presence, meaning-based exploration of distress and goals of care, and prayer. Also communicated with on call chaplains via Epic sticky note.    PLAN: I will follow for spiritual support while Palliative Care is consulted.    Ceci Mckenzie  Palliative Care Consult Service   Pager [phone]  [facility] Inpatient Team Consult pager [phone] (M-F 8-4:30)  After-hours Answering Service [phone]

## 2021-11-05 NOTE — PROGRESS NOTES
MEDICAL ICU PROGRESS NOTE  11/05/2021      Date of Service (when I saw the patient): 11/05/2021    ASSESSMENT: Trinidad Gillette is a 31 year old female with PMH diabetes, bipolar disorder, obesity, and drug abuse who was admitted on 10/26/21 to [facility] for covid PNA. She was intubated on 10/29/21 and transferred to the [facility] MICU for further management. Course complicated by polymicrobial HCAP, TOI, and pneumomediastinum.     Course:  10/22- COVID positive  10/26- Admitted w/ COVID  10/27- Tocilizumab  10/29- Intubated  10/30- Transfer to [facility] requiring NMB, prone positioning, inhaled epoprostenol; started 10-day course of dexamethasone; fevers  10/31- started vanc/Zosyn, worsening renal fxn  11/1- did not tolerate supine position (SpO2 down to 30s), off pressor palliative care consult  11/2- neph consult, furosemide challenge; new pneumomediastinum  11/3- responding to furosemide, supine x1.5 hours  11/4- increasingly hypoxic (SpO2 83-89% on 100% and prone), ECMO consult --> not a candidate  11/5- hypoxia stable, continue aggressive diuresis, repeat CXR     CHANGES and MAJOR THINGS TODAY:   - Stop bicarb gtt  - Continue aggressive diuresis - Lasix 120mg x2 doses today, goal net negative 0.5-1L  - Will attempt prone CXR for follow-up pneumomediastinum, ETT placement     PLAN:     Neuro:  # Pain and sedation  - Midazolam gtt + prn  - Propofol gtt  - Dilaudid gtt + prn  - Holding PTA gabapentin 800 mg TID 2/2 TOI  - RASS -4 to -5, BIS monitoring with goal 40-60  - NMB with vecuronium     # Retinal detachment L eye  - Patient is blind in L eye and pupil 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 Positive test on 10/22. Hospitalized on 10/26. Intubated on 10/29. Proned 10/30, attempted to supine 11/1 with immediate decline (hypotensive, sats 37%, cardiac  dysrhythmia) re-proned immediately. Pan cultured with new VAP 11/1. CXR 11/2 pneumomediastinum. Bedside US with lung sliding in multiple rib spaces bilaterally, predominantly B-line pattern with subpleural consolidations 11/3 with stable pneumomediastinum --> reproned.   - Lung protective ventilation  - Continue prone positioning, head turns as tolerates  - NMB to achieve ventilator synchrony  - Inhaled epoprostenol at 20 ng/kg/min  - Volume management with furosemide 120mg BID today   - Albuterol q4 hours  - Infectious work-up and treatment as below  - Too unstable to evaluate for PE with TTE or CT  - CXR while prone if stabilizes to eval pneumomediastinum     Cardiovascular:  # Septic shock, improved  Elevated lactate with vasopressor requirement on admission. Norepinephrine off since 11/1. TTE 11/2 with LVEF 60-65%, mildly reduced RV function. Troponin negative.   - Continuous telemetry  - Recheck lactate if requiring vasopressor again     GI/Nutrition:  # Severe malnutrition in context of acute condition  # Morbid obesity  - Novasource Renal @ 30 ml/hr  - FWF 30 ml every 4 hours  - Bowel regimen: scheduled senna and Miralax     Renal/Fluids/Electrolytes:  # Non-oliguric TOI  Unclear creatinine baseline, was 0.9 on admission. Responded to aggressive diuresis, creatinine now improving. Renal ultrasound 11/2 negative.  - Nephrology following  - Decrease to furosemide 120mg BID, goal net negative 500mL-1L    # Metabolic acidosis, resolved and now with metabolic alkalosis  Improved acidosis with improvement in renal function and bicarbonate infusion  - Back off on diuresis to BID dosing as above  - Stop bicarbonate infusion     Endocrine:  # Poorly controlled T2DM  # Stress and steroid induced hyperglycemia  Hgb A1C 12.1 on admission  - Glargine 80u BID (home dose)  - Insulin drip, insulin requirements improving  - Goal BG <180  - Hypoglycemic protocol     ID:  # Covid-19 PNA  S/p tocilizumab 10/27. No remdesivir now  that intubated. Decadron x10 days (10/27-11/5).     # HAP (MRSA, Strep agalactiae, Moraxella catarrhalis)   Resp culture 10/30 positive MRSA, Strep agalactiae, Moraxella catarrhalis. De-escalated Zosyn to Ceftriaxone, Resp. Culture 11/1 with candida dubliniensis -> no active treatment  - Continue ceftriaxone and vancomycin     Abx:   Zosyn 11/1-11/2  Vanco 11/1- current  Ceftriaxone 11/2- current     Cultures:  Blood culture 10/30 with Strep salivarius in 1 of 2 bottles  Sputum culture 10/30 (MRSA, Strep agalactiae, Moraxella catarrhalis)  MRSA nasal swab 10/30  (MRSA)  Blood culture 10/31 (neg)  Urine culture 11/1 (neg)  Legionella 11/1 (neg)  Strep antigen 11/1 (neg)  Resp culture 11/1 Candida dubliniensis, MRSA, GBS  Blood culture 11/1 x2 (neg)     Hematology:    # Coagulopathy 2/2 COVID-19  D-dimer on admission >20. BLE US negative for DVT on 11/2. Enoxaparin stopped 2/2 TOI.  - Continue low-intensity heparin infusion      Musculoskeletal:  # Deconditioning and weakness in the setting of critical illness  - PT/OT consultation when appropriate     Skin:  # Left soft tissue triange facet pressure injury  - WOC consulted, appreciate recs  - Wound care per WOC recs     # Ecchymosis on left foot  - Pressure injury prevention strategies per ICU routine     General Cares/Prophylaxis:    DVT Prophylaxis: Heparin gtt  GI Prophylaxis: PPI  Restraints: None  Family Communication: Plan to address goals of care with family today.  Code Status: Full     Lines/tubes/drains:  - PIV x2  - PICC   - Arterial line  - Ocampo  - NG  - ETT  - Rectal tube     Disposition:  - Medical ICU     Patient seen and findings/plan discussed with medical ICU staff, Dr. Haddad.    Lv Reeves MD    ====================================  INTERVAL HISTORY:   Yesterday evening, family was at bedside expressing concern over clinical decompensation. Patient was made DNR, current plan to transition to comfort cares once father arrives, though will  clarify with family this afternoon.    OBJECTIVE:   1. VITAL SIGNS:   Temp:  [98.5  F (36.9  C)-100.8  F (38.2  C)] 98.6  F (37  C)  Pulse:  [] 89  Resp:  [32-37] 32  MAP:  [66 mmHg-92 mmHg] 76 mmHg  Arterial Line BP: ()/(50-71) 133/57  FiO2 (%):  [85 %-100 %] 85 %  SpO2:  [79 %-94 %] 92 %  Ventilation Mode: CMV/AC  (Continuous Mandatory Ventilation/ Assist Control)  FiO2 (%): 85 %  Rate Set (breaths/minute): 32 breaths/min  Tidal Volume Set (mL): 320 mL  PEEP (cm H2O): 10 cmH2O  Oxygen Concentration (%): 100 %  Peak Inspiratory Pressure (cm H2O) (Drager Bianca): 32  Resp: (!) 32    2. INTAKE/ OUTPUT:   I/O last 3 completed shifts:  In: 4172.3 [I.V.:2502.3; NG/GT:980]  Out: 2925 [Urine:2075; Stool:850]    3. PHYSICAL EXAMINATION:  General: Sedate, prone  HEENT: Mucous membranes moist, facial swelling present  Neuro: Sedate, exam limited by NMB  Pulm/Resp: Coarse breath sounds bilaterally and posteriorly  CV: Unable to auscultate 2/2 prone positioning; pedal pulses 2+ with LE edema present  Abdomen: IRINEO secondary to prone positioning  : Ocampo catheter in place, urine yellow and clear  Incisions/Skin: No rashes noted    4. LABS:   Arterial Blood Gases   Recent Labs   Lab 11/05/21 0359 11/04/21 0357 11/03/21 2025 11/03/21  1301   PH 7.39 7.44 7.35 7.36   PCO2 53* 49* 55* 51*   PO2 66* 69* 69* 61*   HCO3 32* 33* 30* 29*     Complete Blood Count   Recent Labs   Lab 11/05/21 0359 11/04/21 0357 11/03/21 0342 11/02/21  0325   WBC 12.2* 10.4 9.7 9.7   HGB 8.0* 7.9* 7.6* 7.7*    380 347 283     Basic Metabolic Panel  Recent Labs   Lab 11/05/21 0549 11/05/21 0359 11/05/21 0357 11/05/21 0223 11/04/21 0436 11/04/21 0357 11/03/21 2122 11/03/21 2025 11/03/21 0428 11/03/21 0342   NA  --  141  141  --   --   --  142  --  143  --  143   POTASSIUM  --  3.8  3.8  --   --   --  4.3  --  4.2  --  4.9   CHLORIDE  --  105  105  --   --   --  105  --  108  --  111*   CO2  --  28 28  --   --   --  32   --  30  --  24   BUN  --  108*  108*  --   --   --  90*  --  88*  --  79*   CR  --  1.97*  1.97*  --   --   --  2.15*  --  2.48*  --  3.11*   * 115*  115* 113* 113*   < > 103*   < > 145*   < > 139*    < > = values in this interval not displayed.     Liver Function Tests  Recent Labs   Lab 11/05/21  0359 11/01/21  1145 10/30/21  0703   AST  --  31  --    ALT  --  20 32   ALKPHOS  --  82 85   BILITOTAL  --  0.4 0.3   ALBUMIN 2.1* 1.7* 1.8*   INR  --   --  0.99     Coagulation Profile  Recent Labs   Lab 10/30/21  0703   INR 0.99   PTT 25       5. RADIOLOGY:   No results found for this or any previous visit (from the past 24 hour(s)).

## 2021-11-05 NOTE — PLAN OF CARE
ICU End of Shift Summary. See flowsheets for vital signs and detailed assessment.    Changes this shift: Remained supine throughout the day, on Fi02 100% PEEP 10. Paralytic on with 1-4/4 twitches. Lots of secretions from nose/mouth. Diuresed with 120 lasix x2. Temp max 100.6- blood cultures, sputum, and UA sent.     Plan: Family planning to discuss goals of care tonight. Trend labs overnight.

## 2021-11-05 NOTE — PROGRESS NOTES
Care Management Follow Up    Length of Stay (days): 6    Expected Discharge Date:  TBD     Concerns to be Addressed: hotel information  Patient plan of care discussed at interdisciplinary rounds: Yes    Additional Information:  SW sent local hotel information to pt's sister (Alanis) via email ([email or web address]) per bedside RN request.     MAU Funez, [facility]  ICU   [facility]  Ph: [phone]  [phone]

## 2021-11-06 PROBLEM — U07.1 PNEUMONIA DUE TO 2019 NOVEL CORONAVIRUS: Status: ACTIVE | Noted: 2021-01-01

## 2021-11-06 PROBLEM — J12.82 PNEUMONIA DUE TO 2019 NOVEL CORONAVIRUS: Status: ACTIVE | Noted: 2021-01-01

## 2021-11-06 NOTE — PLAN OF CARE
ICU End of Shift Summary. See flowsheets for vital signs and detailed assessment.    Changes this shift: Patient having trouble with turns.  She de-sats with turns.  She dropped to 84% while turning to the left and stayed at 87-88% for 3hours.  Patient finally increased to 90%.  Team recommended no head turns until patient above 90% for a few hours.  Was able to turn head back to right about 0200.  Patient sats increased to 96%, HR and BP decreased.  Patient seems to like right side better.  Will let her be here for a while.  Patient put out 600cc urine after 120cc lasixs.    Plan:  Monitor vitals and labs.  Talk with family today about plan.  Will continue to monitor.

## 2021-11-06 NOTE — PLAN OF CARE
ICU End of Shift Summary. See flowsheets for vital signs and detailed assessment.    Changes this shift: Pt tolerating head turns q2h with micro turns q1h. Per MICU, keeping patient proned due pneumomediastinum. Frequently performing oral/nasal cares due to frequent secretions. Vec titrated down to 0.5 due to 0/4 twitches, synchronous with vent. Fi023 at 80% PEEP 10, no repeat gas. Diueresing with 120 lasix x3 doses, no repeat K+ per MICU.     Plan: Waiting for sister to arrive from [place]- will be here either late Sunday evening or early Monday morning, before transitioning to comfort cares.

## 2021-11-06 NOTE — PROGRESS NOTES
MEDICAL ICU PROGRESS NOTE  11/06/2021      Date of Service (when I saw the patient): 11/06/2021    ASSESSMENT: Trinidad Gillette is a 31 year old female with PMH diabetes, bipolar disorder, obesity, and drug abuse who was admitted on 10/26/21 to [facility] for covid PNA. She was intubated on 10/29/21 and transferred to the [facility] MICU for further management. Course complicated by polymicrobial HCAP, TOI, and pneumomediastinum.     Course:  10/22- COVID positive  10/26- Admitted w/ COVID  10/27- Tocilizumab  10/29- Intubated  10/30- Transfer to [facility] requiring NMB, prone positioning, inhaled epoprostenol; started 10-day course of dexamethasone; fevers  10/31-started vanc/Zosyn, worsening renal fxn  11/1- did not tolerate supine position (SpO2 down to 30s), off pressor palliative care consult  11/2- neph consult, furosemide challenge; new pneumomediastinum  11/3- responding to furosemide, supine x1.5 hours  11/4- increasingly hypoxic (SpO2 83-89% on 100% and prone), ECMO consult --> not a candidate  11/5- hypoxia stable, continue aggressive diuresis, repeat CXR  11/6- remains proned x 48 hours     CHANGES and MAJOR THINGS TODAY:   - Lasix 120 mg IV every 6 hours x 2  - remain proned today  - Stop Vancomycin  - Family planning to withdraw care tomorrow    PLAN:     Neuro:  # Pain and sedation  - Midazolam gtt + prn  - Propofol gtt  - Dilaudid gtt + prn  - Holding PTA gabapentin 800 mg TID 2/2 TOI  - RASS -4 to -5, BIS monitoring with goal 40-60  - NMB with vecuronium     # Retinal detachment L eye  - Patient is blind in L eye and pupil 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 Positive test on 10/22. Hospitalized on 10/26. Intubated on 10/29. Proned 10/30, attempted to supine 11/1 with immediate decline (hypotensive, sats 37%, cardiac dysrhythmia) re-proned  immediately. [place] cultured with new VAP 11/1. CXR 11/2 pneumomediastinum. Bedside US with lung sliding in multiple rib spaces bilaterally, predominantly B-line pattern with subpleural consolidations 11/3 with stable pneumomediastinum --> reproned.   - Lung protective ventilation  - Continue prone positioning, head turns as tolerates  - NMB to achieve ventilator synchrony  - Inhaled epoprostenol at 20 ng/kg/min  - Volume management with furosemide 120mg every 6 hours x 3 doses   - Albuterol q4 hours  - Infectious work-up and treatment as below  - Too unstable to evaluate for PE with TTE or CT  - CXR while prone if stabilizes to eval pneumomediastinum 11/5 -> no repeat CXR today     Cardiovascular:  # Septic shock, improved  Elevated lactate with vasopressor requirement on admission. Norepinephrine off since 11/1. TTE 11/2 with LVEF 60-65%, mildly reduced RV function. Troponin negative.   - Continuous telemetry  - Recheck lactate if requiring vasopressor again     GI/Nutrition:  # Severe malnutrition in context of acute condition  # Morbid obesity  - Novasource Renal @ 30 ml/hr  - FWF 30 ml every 4 hours  - Bowel regimen: scheduled senna and Miralax     Renal/Fluids/Electrolytes:  # Non-oliguric TOI  Unclear creatinine baseline, was 0.9 on admission. Responded to aggressive diuresis, creatinine now improving. Renal ultrasound 11/2 negative.  - Nephrology following  - Increase furosemide 120mg BID today, goal net negative 500mL-1L    # Metabolic acidosis, resolved and now with metabolic alkalosis  Improved acidosis with improvement in renal function and bicarbonate infusion  - Lasix 120 mg IV BID today (goal net deficit -500 ml)  - Stop bicarbonate infusion     Endocrine:  # Poorly controlled T2DM  # Stress and steroid induced hyperglycemia  Hgb A1C 12.1 on admission  - Increase glargine 50 units BID  - Insulin drip, insulin requirements improving  - Goal BG <180  - Hypoglycemic protocol     ID:  # Covid-19 PNA  S/p  tocilizumab 10/27. No remdesivir now that intubated. Decadron x10 days (10/27-11/5).     # HCAP (MRSA, Strep agalactiae, Moraxella catarrhalis sputum culture 1 day post intubation)  Resp culture 10/30 positive MRSA, Strep agalactiae, Moraxella catarrhalis. De-escalated Zosyn to Ceftriaxone, Resp. Culture 11/1 with candida dubliniensis -> no active treatment  - Continue ceftriaxone x 10 days  - Stop Vanco 11/6     Abx:   Zosyn 11/1-11/2  Vanco 11/1- 11/6  Ceftriaxone 11/2- current     Cultures:  Blood culture 10/30 with Strep salivarius in 1 of 2 bottles  Sputum culture 10/30 (MRSA, Strep agalactiae, Moraxella catarrhalis)  MRSA nasal swab 10/30  (MRSA)  Blood culture 10/31 (neg)  Urine culture 11/1 (neg)  Legionella 11/1 (neg)  Strep antigen 11/1 (neg)  Resp culture 11/1 Candida dubliniensis, MRSA, GBS  Blood culture 11/1 x2 (neg)     Hematology:    # Coagulopathy 2/2 COVID-19  D-dimer on admission >20. BLE US negative for DVT on 11/2. Enoxaparin stopped 2/2 TOI.  - Continue low-intensity heparin infusion      Musculoskeletal:  # Deconditioning and weakness in the setting of critical illness  - PT/OT consultation when appropriate     Skin:  # Left soft tissue triange facet pressure injury  - WOC consulted, appreciate recs  - Wound care per WOC recs     # Ecchymosis on left foot  - Pressure injury prevention strategies per ICU routine     General Cares/Prophylaxis:    DVT Prophylaxis: Heparin gtt  GI Prophylaxis: PPI  Restraints: None  Family Communication: Family planning to withdraw care on Sunday, 11/7  Code Status: Full     Lines/tubes/drains:  - PIV x2  - PICC   - Arterial line  - Ocampo  - NG  - ETT  - Rectal tube     Disposition:  - Medical ICU     Patient seen and findings/plan discussed with medical ICU staff, Dr. Wes Sibley, APRN, CNP    ====================================  INTERVAL HISTORY:   Nursing notes reviewed. Overnight events include 2-3 instances of hypoxia associated with turns  requiring up to 3 hours for recovering of sats >90%. Remains proned.    OBJECTIVE:   1. VITAL SIGNS:   Temp:  [98.7  F (37.1  C)-100.6  F (38.1  C)] 98.7  F (37.1  C)  Pulse:  [] 80  Resp:  [32] 32  MAP:  [66 mmHg-95 mmHg] 80 mmHg  Arterial Line BP: (105-145)/(50-76) 124/60  FiO2 (%):  [80 %-100 %] 100 %  SpO2:  [83 %-97 %] 96 %     Ventilation Mode: CMV/AC  (Continuous Mandatory Ventilation/ Assist Control)  FiO2 (%): 100 %  Rate Set (breaths/minute): 32 breaths/min  Tidal Volume Set (mL): 320 mL  PEEP (cm H2O): 10 cmH2O  Oxygen Concentration (%): 100 %  Peak Inspiratory Pressure (cm H2O) (Drager Bianca): 32  Resp: (!) 32    2. INTAKE/ OUTPUT:   I/O last 3 completed shifts:  In: 3661.64 [I.V.:2146.64; NG/GT:825]  Out: 3610 [Urine:2960; Stool:650]    3. PHYSICAL EXAMINATION:  General: Sedated, proned  HEENT: scleral edema, ETT right-sided, mucous membranes moist, no crepitus appreciated  Neuro: Sedate, neuro-muscular blockade, train 2/4 twitches  Pulm/Resp: Posterior upper fields coarse, diminished in bases, mechanically assisted, no wheezing or rhonchi  CV: Unable to auscultate 2/2 prone positioning, monitor RRR  Abdomen: IRINEO secondary to prone positioning  : Ocampo catheter in place, urine yellow and clear  Incisions/Skin: pressure ulcer on nare    4. LABS:   Arterial Blood Gases   Recent Labs   Lab 11/05/21  1553 11/05/21  0359 11/04/21 0357 11/03/21 2025   PH 7.35 7.39 7.44 7.35   PCO2 55* 53* 49* 55*   PO2 71* 66* 69* 69*   HCO3 30* 32* 33* 30*     Complete Blood Count   Recent Labs   Lab 11/06/21  0404 11/05/21  0359 11/04/21 0357 11/03/21  0342   WBC 14.1* 12.2* 10.4 9.7   HGB 7.7* 8.0* 7.9* 7.6*    367 380 347     Basic Metabolic Panel  Recent Labs   Lab 11/06/21  0410 11/06/21  0404 11/06/21  0006 11/05/21  1959 11/05/21  1622 11/05/21  1548 11/05/21  0549 11/05/21  0359 11/04/21  0436 11/04/21  0357   NA  --  141  --   --   --  141  --  141  141  --  142   POTASSIUM  --  3.8  --   --   --   3.7  --  3.8  3.8  --  4.3   CHLORIDE  --  105  --   --   --  104  --  105  105  --  105   CO2  --  29  --   --   --  27  --  28  28  --  32   BUN  --  116*  --   --   --  109*  --  108*  108*  --  90*   CR  --  1.79*  --   --   --  1.76*  --  1.97*  1.97*  --  2.15*   * 222* 257* 307*   < > 213*   < > 115*  115*   < > 103*    < > = values in this interval not displayed.     Liver Function Tests  Recent Labs   Lab 11/06/21  0404 11/05/21  0359 11/01/21  1145 10/30/21  0703   AST 17  --  31  --    ALT 40  --  20 32   ALKPHOS 107  --  82 85   BILITOTAL 0.3  --  0.4 0.3   ALBUMIN 2.3* 2.1* 1.7* 1.8*   INR  --   --   --  0.99     Coagulation Profile  Recent Labs   Lab 10/30/21  0703   INR 0.99   PTT 25       5. RADIOLOGY:   Recent Results (from the past 24 hour(s))   XR Chest Port 1 View    Narrative    Exam: XR CHEST PORT 1 VIEW, 11/5/2021 11:11 AM    Indication: Evaluate pneumomediastinum. COVID ARDS, intubated   proned (okay to do XR while proned)    Comparison: Radiographs 11/2/2021 and 11/3/2021.    Findings:   Single portable prone PA view of the chest. Endotracheal tube is 4.5  cm from patrick. Right PICC line with the tip in the right atrium.  Partially visualized enteric tube.    Bilateral extensive pulmonary opacities, obscuring the cardiac  silhouette and mediastinum. No pneumothorax. Pneumomediastinum cannot  be fully evaluated due to extent and density of pulmonary opacities.  Decrease right chest wall subcutaneous emphysema.      Impression    Impression: Increasing bilateral patchy opacities, obscuring cardiac  silhouette and mediastinum.    I have personally reviewed the examination and initial interpretation  and I agree with the findings.    SINAI VALENZUELA MD         SYSTEM ID:  [id number]

## 2021-11-07 NOTE — PLAN OF CARE
ICU End of Shift Summary. See flowsheets for vital signs and detailed assessment.    Changes this shift: Patient was stable enough to be able to have head turns throughout the night.  Sats in the mid 90s at 75-80%, but PaO2 dropped to 70, and FiO2 was increased to 90%.  BS at almost 400 and insulin gtt started.  Bps have been mostly in the 140-160.  In creased vec and prop, but didn't really change  BP.  K+ at 3.3 and protocol started.  Found skin tear on belly and pressure wound on left cheek.      Plan: Give potassium, titrate gtt, continue to monitor.

## 2021-11-07 NOTE — PLAN OF CARE
ICU End of Shift Summary. See flowsheets for vital signs and detailed assessment.    Changes this shift: Titrating Fi02 throughout the day, supine around 1430 - ABG worsening and 02 sats 88-89%, reproned around 1700. RASS -5, paralyzed with VEC at 0.7- 1/4 twitches, did not tolerate paralytic at lower dose- was overbreathing vent. Lantus added back in. Transitioning to subcutaneous Lovenox this evening. ETT switched to bottom of mouth d/t nasal PI. Foam pad changed q2h between ETT and septum prior to switching ETT. Skin tear to abdomen- vaseline gauze and mepilex placed. PI noted to bilateral cheeks under ET tabs - mepilex placed under both tabs when head gear changed.     Plan: Family planning to come tomorrow and transition to comfort cares

## 2021-11-07 NOTE — PROGRESS NOTES
MEDICAL ICU PROGRESS NOTE  11/07/2021      Date of Service (when I saw the patient): 11/07/2021    ASSESSMENT: Trinidad Gillette is a 31 year old female with PMH diabetes, bipolar disorder, obesity, and drug abuse who was admitted on 10/26/21 to [facility] for covid PNA. She was intubated on 10/29/21 and transferred to the [facility] MICU for further management. Course complicated by polymicrobial HCAP, TOI, and pneumomediastinum.     Course:  10/22- COVID positive  10/26- Admitted w/ COVID  10/27- Tocilizumab  10/29- Intubated  10/30- Transfer to [facility] requiring NMB, prone positioning, inhaled epoprostenol; started 10-day course of dexamethasone; fevers  10/31-started vanc/Zosyn, worsening renal fxn  11/1- did not tolerate supine position (SpO2 down to 30s), off pressor palliative care consult  11/2- neph consult, furosemide challenge; new pneumomediastinum  11/3- responding to furosemide, supine x1.5 hours  11/4- increasingly hypoxic (SpO2 83-89% on 100% and prone), ECMO consult --> not a candidate  11/5- hypoxia stable, continue aggressive diuresis, repeat CXR  11/6- remains proned x 48 hours  11/7- Will attempt proning today, continued diuresis     CHANGES and MAJOR THINGS TODAY:   - Will attempt supination today (will verify that this is okay with family first however)  - If able to supinate, will attempt CXR  - Lasix 120 mg IV every 6 hours x 2 doses today  - Restart glargine 60U BID (prev. Dose 50U), will continue with insulin gtt as well  - TOI improving even with diuresis  - PRN labetalol added for systolic BP >180  - Planning on withdrawing cares in next 1-2 days once [place] of [place] is able to come in.    PLAN:     Neuro:  # Pain and sedation  - Midazolam gtt + prn  - Propofol gtt  - Dilaudid PO 4mg Q4H + gtt + prn bumps  - Holding PTA gabapentin 800 mg TID 2/2 TOI  - RASS -4 to -5, BIS monitoring with goal 40-60  - NMB with vecuronium     # Retinal detachment L eye  - Patient is blind in  L eye and pupil 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 Positive test on 10/22. Hospitalized on 10/26. Intubated on 10/29. Proned 10/30, attempted to supine 11/1 with immediate decline (hypotensive, sats 37%, cardiac dysrhythmia) re-proned immediately. Pan cultured with new VAP 11/1. CXR 11/2 pneumomediastinum. Bedside US with lung sliding in multiple rib spaces bilaterally, predominantly B-line pattern with subpleural consolidations 11/3 with stable pneumomediastinum --> reproned.   - Lung protective ventilation  - Will attempt supination today  - CXR while supine if stabilizes to eval pneumomediastinum  - NMB to achieve ventilator synchrony  - Inhaled epoprostenol at 20 ng/kg/min  - Volume management with furosemide 120mg every 6 hours x 2 doses today (11/7)   - Albuterol q4 hours  - Infectious work-up and treatment as below  - Too unstable to evaluate for PE with TTE or CT     Cardiovascular:  # Septic shock, improved  Elevated lactate with vasopressor requirement on admission. Norepinephrine off since 11/1. TTE 11/2 with LVEF 60-65%, mildly reduced RV function. Troponin negative.   - Continuous telemetry  - Recheck lactate if requiring vasopressor again     GI/Nutrition:  # Severe malnutrition in context of acute condition  # Morbid obesity  - Novasource Renal @ 30 ml/hr  - FWF 30 ml every 4 hours  - Bowel regimen: scheduled senna and Miralax     Renal/Fluids/Electrolytes:  # Non-oliguric TOI, improving  Unclear creatinine baseline, was 0.9 on admission. Responded to aggressive diuresis, creatinine now improving. Renal ultrasound 11/2 negative.  - Nephrology following  - Increase furosemide 120mg BID today, goal net negative 500mL-1L    # Metabolic acidosis, resolved and now with metabolic alkalosis  Improved acidosis with improvement in renal function and bicarbonate  infusion  - Lasix 120 mg IV BID today (goal net deficit -500 ml)  - Stop bicarbonate infusion     Endocrine:  # Poorly controlled T2DM  # Stress and steroid induced hyperglycemia  Hgb A1C 12.1 on admission  - Increase glargine 50 units BID  - Insulin drip, insulin requirements improving  - Goal BG <180  - Hypoglycemic protocol     ID:  # Covid-19 PNA  S/p tocilizumab 10/27. No remdesivir now that intubated. Decadron x10 days (10/27-11/5).     # HCAP (MRSA, Strep agalactiae, Moraxella catarrhalis sputum culture 1 day post intubation)  Resp culture 10/30 positive MRSA, Strep agalactiae, Moraxella catarrhalis. De-escalated Zosyn to Ceftriaxone, Resp. Culture 11/1 with candida dubliniensis -> no active treatment  - Continue ceftriaxone x 10 days (last day 11/9)  - Stop Vanco 11/6     Abx:   Zosyn 10/31-11/2  Vanco 10/31- 11/6  Ceftriaxone 11/2- current     Cultures:  Blood culture 10/30 with Strep salivarius in 1 of 2 bottles  Sputum culture 10/30 (MRSA, Strep agalactiae, Moraxella catarrhalis)  MRSA nasal swab 10/30  (MRSA)  Blood culture 10/31 (neg)  Urine culture 11/1 (neg)  Legionella 11/1 (neg)  Strep antigen 11/1 (neg)  Resp culture 11/1 Candida dubliniensis, MRSA, GBS  Blood culture 11/1 x2 (neg)     Hematology:    # Coagulopathy 2/2 COVID-19  D-dimer on admission >20. BLE US negative for DVT on 11/2. Enoxaparin stopped 2/2 TOI, resumed 11/7 after kidney function improved  - Continue enoxaparin 40mg QDay     Musculoskeletal:  # Deconditioning and weakness in the setting of critical illness  - PT/OT consultation when appropriate     Skin:  # Left soft tissue triange facet pressure injury  - WOC consulted, appreciate recs  - Wound care per WOC recs     # Ecchymosis on left foot  - Pressure injury prevention strategies per ICU routine     General Cares/Prophylaxis:    DVT Prophylaxis: Enoxaparin (from heparin gtt d/t shortage)  GI Prophylaxis: PPI  Restraints: None  Family Communication: Family planning to withdraw  care on Sunday, 11/7  Code Status: Full     Lines/tubes/drains:  - PIV x2  - PICC   - Arterial line  - Ocampo  - NG  - ETT  - Rectal tube     Disposition:  - Medical ICU     Patient seen and findings/plan discussed with medical ICU staff, Dr. Dorene Reeves MD    ====================================  INTERVAL HISTORY:   Nursing notes reviewed. Staff overnight noted worsening ulcers on tongue and around mouth, likely 2/2 continued prone positioning. Also noted to be newly hypertensive to ~160 systolic overnight, though no intervention was performed    OBJECTIVE:   1. VITAL SIGNS:   Temp:  [97.6  F (36.4  C)-100.4  F (38  C)] 97.7  F (36.5  C)  Pulse:  [] 80  Resp:  [32] 32  MAP:  [75 mmHg-109 mmHg] 99 mmHg  Arterial Line BP: (123-162)/(55-85) 128/55  FiO2 (%):  [75 %-90 %] 80 %  SpO2:  [90 %-97 %] 94 %     Ventilation Mode: CMV/AC  (Continuous Mandatory Ventilation/ Assist Control)  FiO2 (%): 80 %  Rate Set (breaths/minute): 32 breaths/min  Tidal Volume Set (mL): 320 mL  PEEP (cm H2O): 10 cmH2O  Oxygen Concentration (%): 75 %  Resp: (!) 32    2. INTAKE/ OUTPUT:   I/O last 3 completed shifts:  In: 3610.49 [I.V.:2240.49; NG/GT:620]  Out: 5100 [Urine:4300; Stool:800]    3. PHYSICAL EXAMINATION:  General: Sedated, proned  HEENT: scleral edema, ETT right-sided, mucous membranes moist, no crepitus appreciated  Neuro: Sedate, neuro-muscular blockade, train 2/4 twitches  CV: Unable to auscultate 2/2 prone positioning, monitor RRR  Abdomen: IRINEO secondary to prone positioning  : Ocampo catheter in place, urine yellow and clear  Incisions/Skin: pressure ulcer on nare    4. LABS:   Arterial Blood Gases   Recent Labs   Lab 11/07/21  0505 11/06/21  0539 11/05/21  1553 11/05/21  0359   PH 7.35 7.37 7.35 7.39   PCO2 57* 53* 55* 53*   PO2 70* 91 71* 66*   HCO3 31* 30* 30* 32*     Complete Blood Count   Recent Labs   Lab 11/07/21  0505 11/06/21  0404 11/05/21  0359 11/04/21 0357   WBC 12.7* 14.1* 12.2* 10.4    HGB 7.8* 7.7* 8.0* 7.9*    348 367 380     Basic Metabolic Panel  Recent Labs   Lab 11/07/21  1305 11/07/21  1203 11/07/21  1147 11/07/21  1117 11/07/21  1020 11/07/21  0600 11/07/21  0505 11/06/21  0410 11/06/21  0404 11/05/21  1622 11/05/21  1548 11/05/21  0549 11/05/21  0359   NA  --   --   --   --   --   --  144  --  141  --  141  --  141  141   POTASSIUM  --   --  3.3*  --   --   --  3.3*  --  3.8  --  3.7  --  3.8  3.8   CHLORIDE  --   --   --   --   --   --  106  --  105  --  104  --  105  105   CO2  --   --   --   --   --   --  30  --  29  --  27  --  28  28   BUN  --   --   --   --   --   --  120*  --  116*  --  109*  --  108*  108*   CR  --   --   --   --   --   --  1.34*  --  1.79*  --  1.76*  --  1.97*  1.97*   * 174*  --  181* 189*   < > 189*   < > 222*   < > 213*   < > 115*  115*    < > = values in this interval not displayed.     Liver Function Tests  Recent Labs   Lab 11/07/21  0505 11/06/21  0404 11/05/21  0359 11/01/21  1145   AST 54* 17  --  31   ALT 44 40  --  20   ALKPHOS 95 107  --  82   BILITOTAL 0.3 0.3  --  0.4   ALBUMIN 2.4* 2.3* 2.1* 1.7*     Coagulation Profile  No lab results found in last 7 days.    5. RADIOLOGY:   No results found for this or any previous visit (from the past 24 hour(s)).

## 2021-11-08 NOTE — CONSULTS
[facility] Nurse Inpatient Pressure Injury Assessment   Reason for consultation: Evaluate and treat Left nare wound  NEW:  eval and treat bilateral cheek wounds     ASSESSMENT  Pressure Injury: on left soft tissue triange facet, and columella, Right nostril , hospital acquired ,   This is a Medical Device Related Pressure Injury (MDRPI) due to ETT and NG  Pressure Injury is Stage Deep Tissue Pressure Injury (DTPI)   Contributing factor of the pressure injury: pressure and immobility while proning.   Status: deteriorating with new injuries  Recommend provider order: None, at this time     Pressure injury:  On bilateral facial cheeks, hospital acquired  This is a medical device related pressure injury due to ETT whyte   Pressure injury is Stage deep tissue pressure injury  Contributing factor of the pressure injury:  Pressure and immobility/proning    Pt is now supinated, comfort cares.  Wounds off loaded     TREATMENT PLAN  Bilateral nare facets and columella wounds: Every 2 hours   Leave wound open to air     Bilateral cheek wounds:  Keep covered with mepilex border dressings   Orders Reviewed and Written  WO Nurse follow-up plan:pt is now comfort cares, no f/u planned  Nursing to notify the Provider(s) and re-consult the [facility] Nurse if wound(s) deteriorates or new skin concern.    Patient History  According to provider note(s):31 year old female with PMH diabetes, bipolar disorder, obesity and drug abuse who was admitted on 10/26/21 to [facility] for covid PNA. She was intubated on 10/29/21 and transferred to the [facility] MICU for further management now with HAP (MRSA, Strep agalactiae & Moraxella) 11/1    Objective Data  Containment of urine/stool: Indwelling catheter and External rectal pouch    Current Diet/ Nutrition:  Orders Placed This Encounter      NPO for Medical/Clinical Reasons Except for: Meds      Output:   I/O last 3 completed shifts:  In: 3213.64 [I.V.:1813.64; NG/GT:680]  Out: 3385 [Urine:3085;  Stool:300]    Risk Assessment:   Sensory Perception: 1-->completely limited  Moisture: 4-->rarely moist  Activity: 1-->bedfast  Mobility: 1-->completely immobile  Nutrition: 3-->adequate  Friction and Shear: 1-->problem  Darinel Score: 11      Labs:   Recent Labs   Lab 11/08/21  0321   ALBUMIN 2.5*   HGB 8.0*   WBC 13.2*       Physical Exam  Skin inspection: focused nose, cheeks    Wound Location:  Nose: Left soft tissue triangle facet, columella, Right nostril      Date of Photos 11/2/21, 11/8  Wound History: pt has been proned. Nasogastric tube in left nostril  Measurements (length x width x depth, in cm)   Left facet: 0.7 cm x 0.5 cm  x  0 cm   Columella:  0.4 x 0.9 x 0 cm   Right nostril:  0.4 x 0.7 x 0 cm   Wounds bases all  100 % nonblanchable maroon intact epithelium  Palpation of the wound bed: normal   Periwound skin: intact  Color: pink  Temperature: normal   Drainage:, none  Description of drainage: none  Odor: none  Pain: no grimacing or signs of discomfort,     Wound location:  Bilateral cheeks    Date of photos:  11/8/21  Wound history: pt proned for 48+ hours, unable to tolerate supination. Wounds noted when supinated for brief time on 11/7  ETAD has been moved to off load the area and mepilex applied   Wounds:   Left cheek:  1.5 x 5 x 0.2 cm wound base 50% moist pink dermis, 50% nonblanchable maroon superficially eroded  Right cheek:  1.5 x 5 x 0.2 cm wound.  Base 20% pink moist dermis, 80% nonblanchable red intact epithelium        Interventions  Current support surface: Standard  Low air loss mattress  Current off-loading measures: Foam padding, Pillows under calves and Pillows  Repositioning aid: Pillows  Visual inspection of wound(s) completed   Wound Care: dressings lifted for assessment and smoothed back into place   Supplies: at bedside  Discussed importance of:off-loading pressure to wound  Discussed plan of care with Family and Nurse

## 2021-11-08 NOTE — PROGRESS NOTES
SPIRITUAL HEALTH SERVICES  SPIRITUAL ASSESSMENT Progress Note (Palliative Focus)  [facility] ([place] 4A    REFERRAL SOURCE: Family request.    Visits throughout the day with family of patient Trinidad Gillette, both in the lobby and at bedside, including end of life prayers in the Moravian tradition.     Family are grieving as they anticipate Trinidad's death, mutually supportive in their grief, and appreciative of emotional/spiritual support. They find meaning especially in their belief in the idea [facility] and they have a quilt made from his clothing spread over her bed.    Plan: No follow up unless further bereavement support is requested. Spiritual Health Services is available as needed.    Ceci Mckenzie  Palliative   Pager [phone]  [facility] Inpatient Team Consult pager [phone] (M-F 8-4:30)  After-hours Answering Service [phone]

## 2021-11-08 NOTE — DEATH PRONOUNCEMENT
MD DEATH PRONOUNCEMENT    Called to pronounce Trinidad Gillette dead.    Physical Exam: Unresponsive to noxious stimuli, Spontaneous respirations absent, Breath sounds absent, Carotid pulse absent, Heart sounds absent, Pupillary light reflex absent and Corneal blink reflex absent    Patient was pronounced dead at 04:44 PM, 2021.    Preliminary Cause of Death: Acute hypoxic respiratory failure due to COVID pneumonia    Principal Problem:    Pneumonia due to 2019 novel coronavirus  Active Problems:    Acute respiratory failure with hypoxia (H)    Acute kidney failure, unspecified (H)       Infectious disease present?: YES    Communicable disease present? (examples: HIV, chicken pox, TB, Ebola, CJD) :  YES    Multi-drug resistant organism present? (example: MRSA): NO    Please consider an autopsy if any of the following exist:  NO Unexpected or unexplained death during or following any dental, medical, or surgical diagnostic treatment procedures.   NO Death of mother at or up to seven days after delivery.     NO All  and pediatric deaths.     NO Death where the cause is sufficiently obscure to delay completion of the death certificate.   NO Deaths in which autopsy would confirm a suspected illness/condition that would affect surviving family members or recipients of transplanted organs.     The following deaths must be reported to the 's Office:  NO A death that may be due entirely or in part to any factors other than natural disease (recent surgery, recent trauma, suspected abuse/neglect).   NO A death that may be an accident, suicide, or homicide.     NO Any sudden, unexpected death in which there is no prior history of significant heart disease or any other condition associated with sudden death.   NO A death under suspicious, unusual, or unexpected circumstances.    NO Any death which is apparently due to natural causes but in which the  does not have a personal physician familiar  with the patient s medical history, social, or environmental situation or the circumstances of the terminal event.   NO Any death apparently due to Sudden Infant Death Syndrome.     NO Deaths that occur during, in association with, or as consequences of a diagnostic, therapeutic, or anesthetic procedure.   NO Any death in which a fracture of a major bone has occurred within the past (6) six months.   NO A death of persons note seen by their physician within 120 days of demise.     NO Any death in which the  was an inmate of a public institution or was in the custody of Law Enforcement personnel.   NO  All unexpected deaths of children   NO Solid organ donors   NO Unidentified bodies   NO Deaths of persons whose bodies are to be cremated or otherwise disposed of so that the bodies will later be unavailable for examination;   NO Deaths unattended by a physician outside of a licensed healthcare facility or licensed residential hospice program   NO Deaths occurring within 24 hours of arrival to a health care facility if death is unexpected.    NO Deaths associated with the decedent s employment.   NO Deaths attributed to acts of terrorism.   NO Any death in which there is uncertainty as to whether it is a medical examiner s care should be discussed with the medical investigator.        Body disposition: Autopsy was discussed with family member:  Father, Mother and Sister in person.  Permission for autopsy was declined.

## 2021-11-08 NOTE — PLAN OF CARE
ICU End of Shift Summary. See flowsheets for vital signs and detailed assessment.    Changes this shift: At 1400, MICU team talked to family, reports to RN that family decided to pursue comfort cares. Unresponsive, BIS 40's, Versed @ 15, Dilaudid @ 2, Prop @ 45; sedated stopped at 1615.  Vec stopped at 1140. TOF 2/4 at 1345, recheck TOF 4/4 at 1500. Insulin drip, A4+4, requiring 6-10 units/hr, stopped at 1500. CMV: %, peep 10, Flolan at max strength, stopped at 1545.  Supinate patient and washed patient. WOC consulted for nose PI, assessed patient at bedside. Family visited at bedside and was with patient while she passed. Ventilator weaned down by RT. Palliative care  prayed with patient and family at bedside.     [place] [facility] [place]  Belongings: Returned to sister, Alanis RODRIGUEZ (After Visit Summary) discussed with: NA    Plan: Patient passed 11/8/21 at 1604. [place] [facility] Family aware of situation and left after patient passed. MD announced patient's passing. Continue with POC.    Problem: Adult Inpatient Plan of Care  Goal: Optimal Comfort and Wellbeing  Outcome: No Change     Problem: Adult Inpatient Plan of Care  Goal: Readiness for Transition of Care  Outcome: No Change

## 2021-11-08 NOTE — PROVIDER NOTIFICATION
Alanis, sister called, wanted to know status update and wanted to know if patient status is improving, which may influence family's decision of comfort cares, called MICU 2, Maxine, who reports will call family as soon as team can. RN gave family daily update. Continue with POC.

## 2021-11-08 NOTE — PLAN OF CARE
ICU End of Shift Summary. See flowsheets for vital signs and detailed assessment.    Changes this shift: Patient has been stable throughout the night.  Patient has had to increase FiO2 throughout the night to keep sats above 90%.  Patient Bps have been lower tonight than last night.  Patient BIS has been between 35-45.  It raises along with  BP while suctioning mouth and nares.  She also seems to over breath the vent at these times, but she barely has 2/4 twitches with the TOF.      Plan: Monitor vitals.  Family to come for a conference today.  Will continue to monitor.

## 2021-11-08 NOTE — DISCHARGE SUMMARY
[facility]    Death Summary - Medicine & Pediatrics    Date of Admission:  10/30/2021  Date of Death: 11/8/2021  Attending Provider: Lillian Everett MD  Service: MICU    Discharge Diagnoses   - COVID-19 Pneumonia  - ARDS  - Acute hypoxemic respiratory failure  - Polymicrobial healthcare-associated pneumonia  - Septic shock  - Morbid obesity  - Pneumomediastinum  - Acute kidney injury    Cause of death: Acute hypoxemic respiratory failure secondary to COVID pneumonia    Hospital Course   Trinidad Gillette is a 31 year old female with PMH diabetes, bipolar disorder, obesity, and drug abuse who was admitted on 10/26/21 to [facility] for covid PNA. She was intubated on 10/29/21 and transferred to the [facility] MICU for further management. Course complicated by polymicrobial HCAP, TOI, and pneumomediastinum. Despite treatments with tocilizumab, dexamethasone, remdesivir, and mechanical ventilation, her clinical condition continued to deteriorate and her hypoxic respiratory failure continued to worsen. The decision was made to transition to comfort cares on 11/08/21. Her ventilator settings were weaned to minimal settings. Trinidad passed around 20 minutes later. She was pronounced dead at 4:44pm    Lv Reeves MD  [facility]    ______________________________________________________________________      Significant Results and Procedures   Most Recent 3 CBC's:Recent Labs   Lab Test 11/08/21  0321 11/07/21  0505 11/06/21  0404   WBC 13.2* 12.7* 14.1*   HGB 8.0* 7.8* 7.7*   MCV 94 93 91    383 348     Most Recent 3 BMP's:Recent Labs   Lab Test 11/08/21  1450 11/08/21  1320 11/08/21  1207 11/08/21  0403 11/08/21  0321 11/07/21  2256 11/07/21  2217 11/07/21  1558 11/07/21  1547 11/07/21  0600 11/07/21  0505 11/06/21  0410 11/06/21  0404   NA  --   --   --   --  149*  --   --   --   --   --  144  --  141   POTASSIUM  --    --   --   --  3.7  --  4.0  --  3.8   < > 3.3*  --  3.8   CHLORIDE  --   --   --   --  113*  --   --   --   --   --  106  --  105   CO2  --   --   --   --  29  --   --   --   --   --  30  --  29   BUN  --   --   --   --  123*  --   --   --   --   --  120*  --  116*   CR  --   --   --   --  1.23*  --   --   --   --   --  1.34*  --  1.79*   ANIONGAP  --   --   --   --  7  --   --   --   --   --  8  --  7   ARMAAN  --   --   --   --  9.0  --   --   --   --   --  8.6  --  8.3*   * 120* 116*   < > 115*   < >  --    < >  --    < > 189*   < > 222*    < > = values in this interval not displayed.     Most Recent 2 LFT's:Recent Labs   Lab Test 11/08/21  0321 11/07/21  0505   AST 42 54*   ALT 51* 44   ALKPHOS 93 95   BILITOTAL 0.2 0.3       Consultations This Hospital Stay   VASCULAR ACCESS CARE ADULT IP CONSULT  VASCULAR ACCESS FOR PICC PLACEMENT ADULT IP CONSULT  NUTRITION SERVICES ADULT IP CONSULT  PHARMACY IP CONSULT  PHARMACY TO DOSE VANCO  PHARMACY IP CONSULT  VASCULAR ACCESS CARE ADULT IP CONSULT  NEPHROLOGY ICU IP CONSULT  PALLIATIVE CARE ADULT IP CONSULT  PHARMACY IP CONSULT  PHARMACY IP CONSULT  WOUND OSTOMY CONTINENCE NURSE  IP CONSULT  SPIRITUAL HEALTH SERVICES IP CONSULT  PHARMACY IP CONSULT  WOUND OSTOMY CONTINENCE NURSE  IP CONSULT    Primary Care Physician   [facility]

## 2021-11-09 LAB
BACTERIA BLD CULT: NO GROWTH
BACTERIA SPT CULT: ABNORMAL
BACTERIA SPT CULT: ABNORMAL
GRAM STAIN RESULT: ABNORMAL
GRAM STAIN RESULT: ABNORMAL

## 2021-11-10 LAB
BACTERIA BLD CULT: NO GROWTH

## 2022-11-03 NOTE — PROGRESS NOTES
MEDICAL ICU Progress Note  11/02/2021    Date of Hospital Admission: 10/23/21  Date of ICU Admission: 10/30/21  Reason for Critical Care Admission: Acute Hypoxic Respiratory failure due to Covid PNA  Date of Service (when I saw the patient): 11/02/2021    ASSESSMENT: Trinidad Gillette is a 31 year old female with PMH diabetes, bipolar disorder, obesity and drug abuse who was admitted on 10/26/21 to [facility] for covid PNA. She was intubated on 10/29/21 and transferred to the [facility] MICU for further management now with HAP (MRSA, Strep agalactiae & Moraxella) 11/1    Course:  10/22- COVID positive  10/26- Admitted w/COVID, Decadron x1 (unable to determine complete dosing at OSH)  10/27- Tocilizumab  10/29- Intubated  10/30- Transfer to -Proned, paralyzed, Epoprostenol, Decadron started for 10 day course  11/1- attempted to supinate with immediate decline ->proned      CHANGES and MAJOR THINGS TODAY:   - Renal ultrasound  - CXR  - Nephrology consult  - TTE  - Supinate  - Kidney ultrasound  - BLE ultrasound  - EKG  - Increase Lantus 80 units subcutaneous BID  - Probable dialysis line placement  - Possible CRRT initiation pending stability   - Lasix 120 mg IV every 6 hours x3 today per neph recommendations  - BMP every 6 hours x4 today    PLAN:    Neuro:  # Pain and sedation  - Midazolam gtt + prn  - Propofol gtt  - Dilaudid gtt + prn  - STOP pta gabapentin 800 mg TID 2/2 TOI  - RASS -4 to -5    # Chemical paralysis for vent synchrony  - Vecuronium drip  - Goal to be synchronous with the vent  - Follow BIS monitor for adequate sedation while paralyzed (goal 40-60)    # Retinal Detachment L eye  -Patient is blind in L eye and pupil is nonreactive to light  - Lacrilube every 8 hours 2/2 scleral edema    Pulmonary:  # Acute Hypoxic Respiratory Failure due to Covid PNA  # ARDS  # HAP (Strep agalac., MRSA, Moraxella) 11/1  Symptom onset 10/21. Positive test on 10/22. Hospitalized on 10/26. Intubated on 10/29.  GYN CLINIC VISIT  11/3/2022  CC: annual exam    HPI:  Lauren is a 41 year old  female who presents for annual exam.     Menses are rare No LMP recorded. (Menstrual status: IUD).. Using IUD for contraception.  She is not currently considering pregnancy.  Besides routine health maintenance, she has no other health concerns today .  Questions about perimenopause. Acne, hair thinning. Some mood issues but works with psychiatrist and therapist. Has Mirena IUD, no bleeding. No vasomotor sx.  GYNECOLOGIC HISTORY:  Menarche  Lauren is sexually active with 1male partner(s) and is currently in monogamous relationship .    History sexually transmitted infections:No STD history  STI testing offered?  Declined  PREET exposure: Unknown  History of abnormal Pap smear: NO - age 30-65 PAP every 5 years with negative HPV co-testing recommended  Family history of breast CA: No  Family history of uterine/ovarian CA: No    Family history of colon CA: No    HEALTH MAINTENANCE:  Cholesterol: (  Cholesterol   Date Value Ref Range Status   2022 191 <200 mg/dL Final   2021 195 <200 mg/dL Final   2020 197 <200 mg/dL Final    History of abnormal lipids: No  Mammo: na . History of abnormal Mammo: Not applicable.  Regular Self Breast Exams: No  Calcium/Vitamin D intake: source:  dairy Adequate? Yes  TSH: (  TSH   Date Value Ref Range Status   2022 4.25 (H) 0.40 - 4.00 mU/L Final   2021 3.81 0.40 - 4.00 mU/L Final    )  Pap; (  Lab Results   Component Value Date    PAP NIL 2020    PAP NIL 2014    )    HISTORY:  OB History    Para Term  AB Living   1 1 1 0 0 1   SAB IAB Ectopic Multiple Live Births   0 0 0 0 1      # Outcome Date GA Lbr [place]/2nd Weight Sex Delivery Anes PTL Lv   1 Term 14 41w2d 04:17 / 00:30 3.96 kg (8 lb 11.7 oz) M Vag-Spont Local, EPI N CASSI      Birth Comments: Date of Admission:  3/24/2014  2:32 AM      Name: ELKE MARTEL      Apgar1: 8  Apgar5: 9     Past  Medical History:   Diagnosis Date     Gallstones      NO ACTIVE PROBLEMS      Past Surgical History:   Procedure Laterality Date     LAPAROSCOPIC CHOLECYSTECTOMY  7/5/2014    Procedure: LAPAROSCOPIC CHOLECYSTECTOMY;  Surgeon: Pako Sherman MD;  Location:  OR     NO HISTORY OF SURGERY       Family History   Problem Relation Age of Onset     Cancer Mother      Cancer Maternal Grandfather      Cancer Paternal Grandfather         skin cancer     Social History   Works for nonprofit   Socioeconomic History     Marital status:    Occupational History     Occupation: [employer] ([place])     Employer: SELF EMPLOYED.     Comment: full time   Tobacco Use     Smoking status: Former     Smokeless tobacco: Never     Tobacco comments:     quit 3 years ago   Substance and Sexual Activity     Alcohol use: Yes     Comment: social     Drug use: No     Sexual activity: Yes     Partners: Male     Birth control/protection: I.U.D.   Other Topics Concern     Parent/sibling w/ CABG, MI or angioplasty before 65F 55M? No   Social History Narrative    How much exercise per week? 3-4 hours    How much calcium per day? diet       How much caffeine per day? 3 cups    How much vitamin D per day? Vitamins and diet    Do you/your family wear seatbelts?  Yes    Do you/your family use safety helmets? Yes    Do you/your family use sunscreen? Yes    Do you/your family keep firearms in the home? No    Do you/your family have a smoke detector(s)? Yes        Do you feel safe in your home? Yes    Has anyone ever touched you in an unwanted manner? No    Roxana Stanley LPN    6/16/14           Current Outpatient Medications:      clindamycin-benzoyl peroxide (BENZACLIN) 1-5 % external gel, Apply topically 2 times daily, Disp: 50 g, Rfl: 1     IBUPROFEN PO, Take 400 mg by mouth every 6 hours as needed for moderate pain, Disp: , Rfl:      ketoconazole (NIZORAL) 2 % external shampoo, Twice weekly for 4 weeks then weekly., Disp: 120 mL, Rfl: 0      Proned 10/30, attempted to supinate 11/1 with immediate decline (hypotensive, sats 37%, cardiac dysrhythmia) re-proned immediately. Pan cultured with new VAP 11/1  - Dexamethasone 6mg x 10d (first dose on 10/27)  - Tocilizumab 10/27  - Duoneb q4h PRN  - Full strength veletri  - Attempted supination today with immediate desaturation to 37% -> immediately reproned.   - Remains paralyzed and sedated  - Abx per ID section below    Ventilation Mode: CMV/AC  (Continuous Mandatory Ventilation/ Assist Control)  FiO2 (%): 50 %  Rate Set (breaths/minute): 30 breaths/min  Tidal Volume Set (mL): 380 mL  PEEP (cm H2O): 14 cmH2O  Oxygen Concentration (%): 40 %  Resp: 30    Cardiovascular:  # Concern for septic shock with hypotension  Had elevated lactic acid on admission, resolved with fluid.  Hypotension could be related to sedation medications, sepsis and hypovolemia  - Norepi drip to keep MAP >65  - Continue to monitor  - Telemetry per ICU routine  - Troponin normal  - TTE when able to tolerate supination -> ordered  - Lactate 1.8    GI/Nutrition:  # Severe malnutrition in context of acute condition  # Morbid obesity  - Osmolite 1.5@ 45 ml/hr  - FWF 30 ml every 4 hours  - PPI  - Bowel Regimen scheduled and PRN  - Dietician consulted, appreciate recs    Renal/Fluids/Electrolytes:  # TOI  # metabolic acidosis  Unclear creatinine baseline.  - Creat 0.9 on admit here; now up to 3.42  - FENA 0.5 assumed pre-renal    50g 5% albumin (10/30)    Lactated ringer 500cc IV x1 (11/1)  - Repeat BMP  - Renal ultrasound  - Nephrology consult  - Plan for placement of dialysis line in anticipation of CRRT    Endocrine:  # Poorly controlled T2DM  Hgb A1C 12.1 on admission  - Glargine 40u BID (1/2 home dose)  - Insulin drip   - Goal BG <180  - Hypoglycemic protocol    ID:  # Covid-19 PNA  -Decadron (10/27-  -Remdesivir deferred now that she is intubated  -Toci on 10/27  -Trend daily inflammatory markers x4 days  - procal 7.20 (0.06)  - CRP 98  (83)    # HAP (MRSA, Strep agalactiae, Moraxella catarrhalis)  Resp culture 10/30.  - continue Vanco  - Continue Zosyn    Abx:   Zosyn 11/1-  Vanco 11/1-    Cultures:    Blood culture 10/30 with GPC in 1 of 2 bottles, negative verigene    Sputum culture 10/30 (MRSA, strep agalactiae, Moraxella catarrhalis)    MRSA nasal swab 10/30  (MRSA)    Legionella 11/1 (neg)    Strep antigen 11/1 (neg)        Hematology:    # Coagulopathy 2/2 COVID-19  D-dimer on admission >20  - Stop enoxaparin 2/2 TOI  - Start Heparin infusion (LIP)    Musculoskeletal:  # Deconditioning and weakness in the setting of critical illness  - PT/OT consultation when appropriate    Skin:  # Ecchymosis on left foot  - Pressure injury prevention strategies per ICU routine    General Cares/Prophylaxis:    DVT Prophylaxis: Enoxaparin (Lovenox) SQ  GI Prophylaxis: PPI  Restraints: None  Family Communication: Update sisterAlanis via telephone  Code Status: Full    Lines/tubes/drains:  - PIV  - PICC   - Arterial line    Disposition:  - Medical ICU     Patient seen and findings/plan discussed with medical ICU staff, Dr. Catie Sibley, APRN, CNP    -----------------------------------------------------------------------  Overnight Events:  Nursing notes reviewed. No acute overnight events. Able to titrate down FiO2. Lasix 80mg IV x1 given for K+5.5, repeat 5.4. Concerns for high insulin infusion demands.    PHYSICAL EXAMINATION:  Temp:  [94.5  F (34.7  C)-98.3  F (36.8  C)] 97.5  F (36.4  C)  Pulse:  [68-92] 84  Resp:  [30] 30  MAP:  [61 mmHg-98 mmHg] 77 mmHg  Arterial Line BP: ()/(46-76) 111/63  FiO2 (%):  [40 %-85 %] 50 %  SpO2:  [84 %-100 %] 95 %     General: ill-appearing, sedated, proned  HEENT: NCAT, L pupil irregular is nonreactive and dilated (chronic), ETT right-sedated  Neuro: Sedated and paralyzed, no spontaneous movement noted  Pulm/Resp: Breath diminished upper doses, crackles mid-lower bases, mechanically assisted   CV: RRR,  lamoTRIgine (LAMICTAL) 150 MG tablet, , Disp: , Rfl:      risperiDONE (RISPERDAL) 1 MG tablet, Take 1 tablet (1 mg) by mouth 2 times daily, Disp:  , Rfl:      tretinoin (RETIN-A) 0.05 % external cream, Apply topically At Bedtime, Disp: 45 g, Rfl: 1     venlafaxine (EFFEXOR-XR) 150 MG 24 hr capsule, , Disp: , Rfl:      Allergies   Allergen Reactions     Nkda [No Known Drug Allergies]        Past medical, surgical, social and family history were reviewed and updated in EPIC.    ROS:   C:     NEGATIVE for fever, chills, change in weight  I:       NEGATIVE for worrisome rashes, moles or lesions  E:     NEGATIVE for vision changes or irritation  E/M: NEGATIVE for ear, mouth and throat problems  R:     NEGATIVE for significant cough or SOB  CV:   NEGATIVE for chest pain, palpitations or peripheral edema  GI:     NEGATIVE for nausea, abdominal pain, heartburn, or change in bowel habits  :   NEGATIVE for frequency, dysuria, hematuria, vaginal discharge, or irregular bleeding  M:     NEGATIVE for significant arthralgias or myalgia  N:      NEGATIVE for weakness, dizziness or paresthesias  E:      NEGATIVE for temperature intolerance, skin/hair changes  P:      NEGATIVE for changes in mood or affect.    EXAM:  There were no vitals taken for this visit.   BMI: There is no height or weight on file to calculate BMI.  Constitutional: healthy, alert and no distress  Head: Normocephalic. No masses, lesions, tenderness or abnormalities  Neck: Neck supple. Trachea midline. No adenopathy. Thyroid symmetric, normal size.   Cardiovascular: RRR.   Respiratory: clear bilaterally.   Breast: No nodularity, asymmetry or nipple discharge bilaterally.  Gastrointestinal: Abdomen soft, non-tender, non-distended. No masses, organomegaly.  :  Vulva:  No external lesions, normal female hair distribution, no inguinal adenopathy.    Urethra:  Midline, non-tender, well supported, no discharge  Vagina:  Moist, pink, no abnormal discharge, no  S1/S2, no murmurs, rubs, gallops, appreciable edema  Abdomen: not assessed in prone position  : hart catheter in place, urine yellow and clear with sediment  Incisions/Skin: No noted skin breakdowns, skin graft on R thigh, healed incision in lower spine, ecchymosis on cheeks    LABS: Reviewed.   Arterial Blood Gases   Recent Labs   Lab 11/02/21  0326 11/01/21 2058 11/01/21  1745 11/01/21  1150   PH 7.28* 7.25* 7.28* 7.26*   PCO2 42 42 42 44   PO2 69* 108* 188* 172*   HCO3 19* 18* 19* 20*     Complete Blood Count   Recent Labs   Lab 11/02/21  0325 11/01/21  1335 11/01/21  0422 10/31/21  0440   WBC 9.7 11.6* 12.1* 18.8*   HGB 7.7* 7.7* 8.0* 10.1*    290 324 419     Basic Metabolic Panel  Recent Labs   Lab 11/02/21  0325 11/02/21  0319 11/02/21  0217 11/02/21  0126 11/01/21  2250 11/01/21 2057 11/01/21  1754 11/01/21  1745 11/01/21  1201 11/01/21  1145     --   --   --   --  142  --  141  --  141   POTASSIUM 5.4*  --   --   --   --  5.5*  --  5.1  --  5.0   CHLORIDE 111*  --   --   --   --  113*  --  115*  --  114*   CO2 20  --   --   --   --  19*  --  19*  --  21   BUN 62*  --   --   --   --  59*  --  56*  --  60*   CR 3.41*  --   --   --   --  3.45*  --  3.40*  --  3.58*   * 177* 192* 205*   < > 221*   < > 181*   < > 215*    < > = values in this interval not displayed.     Liver Function Tests  Recent Labs   Lab 11/01/21  1145 10/30/21  0703   AST 31  --    ALT 20 32   ALKPHOS 82 85   BILITOTAL 0.4 0.3   ALBUMIN 1.7* 1.8*   INR  --  0.99     Coagulation Profile  Recent Labs   Lab 10/30/21  0703   INR 0.99   PTT 25       IMAGING:  No results found for this or any previous visit (from the past 24 hour(s)).   lesions  Uterus:  Normal size , non-tender, freely mobile  Cervix: normal appearing, IUD strings visualized  Ovaries:  No masses appreciated, non-tender, mobile  Rectal Exam: deferred  Musculoskeletal: extremities normal  Skin: no suspicious lesions or rashes  Psychiatric: Affect appropriate, cooperative,mentation appears normal.     Reviewed recent labs    Latest Reference Range & Units 08/16/22 09:51   Sodium 133 - 144 mmol/L 137   Potassium 3.4 - 5.3 mmol/L 4.1   Chloride 94 - 109 mmol/L 103   Carbon Dioxide 20 - 32 mmol/L 25   Urea Nitrogen 7 - 30 mg/dL 15   Creatinine 0.52 - 1.04 mg/dL 1.01   GFR Estimate >60 mL/min/1.73m2 71   Calcium 8.5 - 10.1 mg/dL 9.6   Anion Gap 3 - 14 mmol/L 9   Albumin 3.4 - 5.0 g/dL 3.6   Protein Total 6.8 - 8.8 g/dL 7.8   Alkaline Phosphatase 40 - 150 U/L 110   ALT 0 - 50 U/L 30   AST 0 - 45 U/L 20   Bilirubin Direct 0.0 - 0.2 mg/dL 0.1   Bilirubin Total 0.2 - 1.3 mg/dL 0.3   Cholesterol <200 mg/dL 191   Patient Fasting > 8hrs?  Yes   HDL Cholesterol >=50 mg/dL 41 (L)   Hemoglobin A1C 0.0 - 5.6 % 5.4   LDL Cholesterol Calculated <=100 mg/dL 114 (H)   Non HDL Cholesterol <130 mg/dL 150 (H)   T4 Free 0.76 - 1.46 ng/dL 1.04   Triglycerides <150 mg/dL 180 (H)   TSH 0.40 - 4.00 mU/L 4.25 (H)   Vitamin D Deficiency screening 20 - 75 ug/L 36   Glucose 70 - 99 mg/dL 95   WBC 4.0 - 11.0 10e3/uL 10.8   Hemoglobin 11.7 - 15.7 g/dL 13.5   Hematocrit 35.0 - 47.0 % 40.3   Platelet Count 150 - 450 10e3/uL 270   (L): Data is abnormally low  (H): Data is abnormally high    COUNSELING:   Reviewed preventive health counseling, as reflected in patient instructions       Regular exercise       Healthy diet/nutrition       Contraception       ([place])menopause management   reports that she has quit smoking. She has never used smokeless tobacco.    There is no height or weight on file to calculate BMI.    FRAX Risk Assessment    ASSESSMENT:  41 year old female with satisfactory annual exam    1. Encounter for  gynecological examination without abnormal finding  Next pap due 2025  Discussed colon ca screening starts at age 45    2. Encounter for screening mammogram for breast cancer  - MA Screen Bilateral w/Rodri; Future    3. IUD check up      Toshia Sofia MD